# Patient Record
Sex: FEMALE | Race: BLACK OR AFRICAN AMERICAN | Employment: PART TIME | URBAN - METROPOLITAN AREA
[De-identification: names, ages, dates, MRNs, and addresses within clinical notes are randomized per-mention and may not be internally consistent; named-entity substitution may affect disease eponyms.]

---

## 2018-02-09 PROBLEM — M25.371 ANKLE INSTABILITY, RIGHT: Status: ACTIVE | Noted: 2018-02-09

## 2018-11-17 ENCOUNTER — HOSPITAL ENCOUNTER (OUTPATIENT)
Dept: SLEEP CENTER | Age: 43
Discharge: HOME | End: 2018-11-17
Payer: COMMERCIAL

## 2018-11-17 DIAGNOSIS — G47.33 OSA (OBSTRUCTIVE SLEEP APNEA): ICD-10-CM

## 2018-11-17 PROCEDURE — 95810 POLYSOM 6/> YRS 4/> PARAM: CPT

## 2019-02-08 ENCOUNTER — HOSPITAL ENCOUNTER (OUTPATIENT)
Dept: SLEEP CENTER | Age: 44
Discharge: HOME | End: 2019-02-08
Payer: COMMERCIAL

## 2019-02-08 DIAGNOSIS — G47.33 OSA (OBSTRUCTIVE SLEEP APNEA): ICD-10-CM

## 2019-02-08 PROCEDURE — 95811 POLYSOM 6/>YRS CPAP 4/> PARM: CPT

## 2020-03-09 ENCOUNTER — TELEPHONE (OUTPATIENT)
Dept: NEUROSURGERY | Age: 45
End: 2020-03-09

## 2021-02-08 VITALS — HEIGHT: 65 IN | BODY MASS INDEX: 35.82 KG/M2 | WEIGHT: 215 LBS

## 2021-02-08 RX ORDER — SODIUM CHLORIDE 0.9 % (FLUSH) 0.9 %
10 SYRINGE (ML) INJECTION EVERY 12 HOURS SCHEDULED
Status: CANCELLED | OUTPATIENT
Start: 2021-02-08

## 2021-02-08 RX ORDER — SODIUM CHLORIDE, SODIUM LACTATE, POTASSIUM CHLORIDE, CALCIUM CHLORIDE 600; 310; 30; 20 MG/100ML; MG/100ML; MG/100ML; MG/100ML
INJECTION, SOLUTION INTRAVENOUS CONTINUOUS
Status: CANCELLED | OUTPATIENT
Start: 2021-02-08

## 2021-02-08 RX ORDER — SODIUM CHLORIDE 0.9 % (FLUSH) 0.9 %
10 SYRINGE (ML) INJECTION PRN
Status: CANCELLED | OUTPATIENT
Start: 2021-02-08

## 2021-02-09 ENCOUNTER — HOSPITAL ENCOUNTER (OUTPATIENT)
Dept: PREADMISSION TESTING | Age: 46
Discharge: HOME | End: 2021-02-09
Payer: COMMERCIAL

## 2021-02-09 ENCOUNTER — HOSPITAL ENCOUNTER (OUTPATIENT)
Age: 46
Discharge: HOME | End: 2021-02-11
Payer: COMMERCIAL

## 2021-02-09 DIAGNOSIS — Z01.818 PRE-OP TESTING: ICD-10-CM

## 2021-02-09 DIAGNOSIS — Z01.818 PRE-OP TESTING: Primary | ICD-10-CM

## 2021-02-09 PROCEDURE — U0003 INFECTIOUS AGENT DETECTION BY NUCLEIC ACID (DNA OR RNA); SEVERE ACUTE RESPIRATORY SYNDROME CORONAVIRUS 2 (SARS-COV-2) (CORONAVIRUS DISEASE [COVID-19]), AMPLIFIED PROBE TECHNIQUE, MAKING USE OF HIGH THROUGHPUT TECHNOLOGIES AS DESCRIBED BY CMS-2020-01-R: HCPCS

## 2021-02-10 ENCOUNTER — HOSPITAL ENCOUNTER (OUTPATIENT)
Dept: PREADMISSION TESTING | Age: 46
Discharge: HOME | End: 2021-02-10
Payer: COMMERCIAL

## 2021-02-10 VITALS
WEIGHT: 210 LBS | BODY MASS INDEX: 33.75 KG/M2 | SYSTOLIC BLOOD PRESSURE: 144 MMHG | HEIGHT: 66 IN | DIASTOLIC BLOOD PRESSURE: 75 MMHG | RESPIRATION RATE: 18 BRPM | HEART RATE: 98 BPM | TEMPERATURE: 97.9 F | OXYGEN SATURATION: 99 %

## 2021-02-10 DIAGNOSIS — Z01.818 PRE-OP TESTING: ICD-10-CM

## 2021-02-10 LAB
ANION GAP SERPL CALCULATED.3IONS-SCNC: 10 MMOL/L (ref 7–16)
BUN BLDV-MCNC: 11 MG/DL (ref 6–20)
CALCIUM SERPL-MCNC: 8.8 MG/DL (ref 8.6–10.2)
CHLORIDE BLD-SCNC: 101 MMOL/L (ref 98–107)
CO2: 23 MMOL/L (ref 22–29)
CREAT SERPL-MCNC: 0.7 MG/DL (ref 0.5–1)
GFR AFRICAN AMERICAN: >60
GFR NON-AFRICAN AMERICAN: >60 ML/MIN/1.73
GLUCOSE BLD-MCNC: 93 MG/DL (ref 74–99)
HCT VFR BLD CALC: 37.3 % (ref 34–48)
HEMOGLOBIN: 12.6 G/DL (ref 11.5–15.5)
MCH RBC QN AUTO: 30.3 PG (ref 26–35)
MCHC RBC AUTO-ENTMCNC: 33.8 % (ref 32–34.5)
MCV RBC AUTO: 89.7 FL (ref 80–99.9)
PDW BLD-RTO: 13 FL (ref 11.5–15)
PLATELET # BLD: 294 E9/L (ref 130–450)
PMV BLD AUTO: 9.6 FL (ref 7–12)
POTASSIUM SERPL-SCNC: 4.2 MMOL/L (ref 3.5–5)
RBC # BLD: 4.16 E12/L (ref 3.5–5.5)
SODIUM BLD-SCNC: 134 MMOL/L (ref 132–146)
WBC # BLD: 6.9 E9/L (ref 4.5–11.5)

## 2021-02-10 PROCEDURE — 36415 COLL VENOUS BLD VENIPUNCTURE: CPT

## 2021-02-10 PROCEDURE — 80048 BASIC METABOLIC PNL TOTAL CA: CPT

## 2021-02-10 PROCEDURE — 93005 ELECTROCARDIOGRAM TRACING: CPT | Performed by: ANESTHESIOLOGY

## 2021-02-10 PROCEDURE — 85027 COMPLETE CBC AUTOMATED: CPT

## 2021-02-10 NOTE — PROGRESS NOTES
[address] [facility]                                                                                                                    PRE OP INSTRUCTIONS FOR  Shanelle Okeefe        Date: 2/10/2021    Date of surgery: 2/15/2021   Arrival Time: Hospital will call you between 5pm and 7pm with your final arrival time for surgery    1. Do not eat or drink anything after midnight prior to surgery. This includes no water, chewing gum, mints or ice chips. 2. Take the following medications with a small sip of water on the morning of Surgery: none. Bring albuterol and BP med        3. Aspirin, Ibuprofen, Advil, Naproxen, Vitamin E and other Anti-inflammatory products should be stopped  before surgery  as directed by your physician. Take Tylenol only unless instructed otherwise by your surgeon. 4. Don't use illicit drugs and do not drink any alcoholic beverages 24 hours prior to surgery. Stop smoking    5. You may brush your teeth the morning of surgery. DO NOT SWALLOW WATER    6. You MUST make arrangements for a responsible adult to take you home after your surgery. You will not be allowed to leave alone or drive yourself home. It is strongly suggested someone stay with you the first 24 hrs. Your surgery will be cancelled if you do not have a ride home. 7.     8. Please wear simple, loose fitting clothing to the hospital.  Arley Sayer not bring valuables (money, credit cards, checkbooks, etc.) Do not wear any makeup (including no eye makeup) or nail polish on your fingers or toes. 9. DO NOT wear any jewelry or piercings on day of surgery. All body piercing jewelry must be removed. 10.  Shower the night before surgery with _x__Antibacterial soap /  6.   15.     15.     15.     13. Notify your Surgeon if you develop any illness between now and surgery time, cough, cold, fever, sore throat, nausea, vomiting, etc.  Please notify your surgeon if you experience dizziness, shortness of breath or blurred vision between now & the time of your surgery. 16. If you have ___dentures, they will be removed before going to the OR; we will provide you a container. If you wear ___contact lenses or ___glasses, they will be removed; please bring a case for them. 17. To provide excellent care visitors will be limited to 1  in the room at any given time. 18.     19. Sleep apnea patients need to bring  SETTINGS to hospital on day of surgery. 20. During flu season no children under the age of 15 are permitted in the hospital for the safety of all patients. 21. Other come in main lobby and stop at                  Please call AMBULATORY CARE if you have any further questions.    [address] [facility]     [address]

## 2021-02-11 LAB
EKG ATRIAL RATE: 74 BPM
EKG P AXIS: 18 DEGREES
EKG P-R INTERVAL: 122 MS
EKG Q-T INTERVAL: 396 MS
EKG QRS DURATION: 80 MS
EKG QTC CALCULATION (BAZETT): 439 MS
EKG R AXIS: 21 DEGREES
EKG T AXIS: 23 DEGREES
EKG VENTRICULAR RATE: 74 BPM
SARS-COV-2: NOT DETECTED

## 2021-02-13 ENCOUNTER — ANESTHESIA EVENT (OUTPATIENT)
Dept: OPERATING ROOM | Age: 46
End: 2021-02-13
Payer: COMMERCIAL

## 2021-02-15 ENCOUNTER — ANESTHESIA (OUTPATIENT)
Dept: OPERATING ROOM | Age: 46
End: 2021-02-15
Payer: COMMERCIAL

## 2021-02-15 ENCOUNTER — HOSPITAL ENCOUNTER (OUTPATIENT)
Age: 46
Setting detail: OUTPATIENT SURGERY
Discharge: HOME | End: 2021-02-15
Attending: OBSTETRICS & GYNECOLOGY | Admitting: OBSTETRICS & GYNECOLOGY
Payer: COMMERCIAL

## 2021-02-15 VITALS
WEIGHT: 215 LBS | DIASTOLIC BLOOD PRESSURE: 82 MMHG | TEMPERATURE: 97 F | SYSTOLIC BLOOD PRESSURE: 168 MMHG | RESPIRATION RATE: 14 BRPM | OXYGEN SATURATION: 99 % | BODY MASS INDEX: 35.82 KG/M2 | HEART RATE: 76 BPM | HEIGHT: 65 IN

## 2021-02-15 VITALS
SYSTOLIC BLOOD PRESSURE: 118 MMHG | OXYGEN SATURATION: 100 % | DIASTOLIC BLOOD PRESSURE: 86 MMHG | RESPIRATION RATE: 20 BRPM

## 2021-02-15 DIAGNOSIS — Z01.818 PRE-OP TESTING: Primary | ICD-10-CM

## 2021-02-15 DIAGNOSIS — G89.18 POSTOPERATIVE PAIN: ICD-10-CM

## 2021-02-15 LAB — HCG(URINE) PREGNANCY TEST: NEGATIVE

## 2021-02-15 PROCEDURE — 7100000011 HC PHASE II RECOVERY - ADDTL 15 MIN: Performed by: OBSTETRICS & GYNECOLOGY

## 2021-02-15 PROCEDURE — 2580000003 HC RX 258: Performed by: ANESTHESIOLOGY

## 2021-02-15 PROCEDURE — 6370000000 HC RX 637 (ALT 250 FOR IP)

## 2021-02-15 PROCEDURE — 3700000000 HC ANESTHESIA ATTENDED CARE: Performed by: OBSTETRICS & GYNECOLOGY

## 2021-02-15 PROCEDURE — 7100000010 HC PHASE II RECOVERY - FIRST 15 MIN: Performed by: OBSTETRICS & GYNECOLOGY

## 2021-02-15 PROCEDURE — 7100000000 HC PACU RECOVERY - FIRST 15 MIN: Performed by: OBSTETRICS & GYNECOLOGY

## 2021-02-15 PROCEDURE — 81025 URINE PREGNANCY TEST: CPT

## 2021-02-15 PROCEDURE — 7100000001 HC PACU RECOVERY - ADDTL 15 MIN: Performed by: OBSTETRICS & GYNECOLOGY

## 2021-02-15 PROCEDURE — 3600000013 HC SURGERY LEVEL 3 ADDTL 15MIN: Performed by: OBSTETRICS & GYNECOLOGY

## 2021-02-15 PROCEDURE — 6360000002 HC RX W HCPCS: Performed by: OBSTETRICS & GYNECOLOGY

## 2021-02-15 PROCEDURE — 6360000002 HC RX W HCPCS

## 2021-02-15 PROCEDURE — C1886 CATHETER, ABLATION: HCPCS | Performed by: OBSTETRICS & GYNECOLOGY

## 2021-02-15 PROCEDURE — 6370000000 HC RX 637 (ALT 250 FOR IP): Performed by: ANESTHESIOLOGY

## 2021-02-15 PROCEDURE — 3700000001 HC ADD 15 MINUTES (ANESTHESIA): Performed by: OBSTETRICS & GYNECOLOGY

## 2021-02-15 PROCEDURE — 3600000003 HC SURGERY LEVEL 3 BASE: Performed by: OBSTETRICS & GYNECOLOGY

## 2021-02-15 PROCEDURE — 88305 TISSUE EXAM BY PATHOLOGIST: CPT

## 2021-02-15 PROCEDURE — 2709999900 HC NON-CHARGEABLE SUPPLY: Performed by: OBSTETRICS & GYNECOLOGY

## 2021-02-15 RX ORDER — PROPOFOL 10 MG/ML
INJECTION, EMULSION INTRAVENOUS PRN
Status: DISCONTINUED | OUTPATIENT
Start: 2021-02-15 | End: 2021-02-15 | Stop reason: SDUPTHER

## 2021-02-15 RX ORDER — DEXAMETHASONE SODIUM PHOSPHATE 10 MG/ML
INJECTION, SOLUTION INTRAMUSCULAR; INTRAVENOUS PRN
Status: DISCONTINUED | OUTPATIENT
Start: 2021-02-15 | End: 2021-02-15 | Stop reason: SDUPTHER

## 2021-02-15 RX ORDER — CEFAZOLIN SODIUM 2 G/50ML
2000 SOLUTION INTRAVENOUS EVERY 8 HOURS
Status: DISCONTINUED | OUTPATIENT
Start: 2021-02-15 | End: 2021-02-15 | Stop reason: HOSPADM

## 2021-02-15 RX ORDER — ALBUTEROL SULFATE 90 UG/1
AEROSOL, METERED RESPIRATORY (INHALATION) PRN
Status: DISCONTINUED | OUTPATIENT
Start: 2021-02-15 | End: 2021-02-15 | Stop reason: SDUPTHER

## 2021-02-15 RX ORDER — LIDOCAINE HYDROCHLORIDE 20 MG/ML
INJECTION, SOLUTION INTRAVENOUS PRN
Status: DISCONTINUED | OUTPATIENT
Start: 2021-02-15 | End: 2021-02-15 | Stop reason: SDUPTHER

## 2021-02-15 RX ORDER — MIDAZOLAM HYDROCHLORIDE 1 MG/ML
INJECTION INTRAMUSCULAR; INTRAVENOUS PRN
Status: DISCONTINUED | OUTPATIENT
Start: 2021-02-15 | End: 2021-02-15 | Stop reason: SDUPTHER

## 2021-02-15 RX ORDER — SODIUM CHLORIDE 0.9 % (FLUSH) 0.9 %
10 SYRINGE (ML) INJECTION PRN
Status: DISCONTINUED | OUTPATIENT
Start: 2021-02-15 | End: 2021-02-15 | Stop reason: HOSPADM

## 2021-02-15 RX ORDER — FENTANYL CITRATE 50 UG/ML
INJECTION, SOLUTION INTRAMUSCULAR; INTRAVENOUS PRN
Status: DISCONTINUED | OUTPATIENT
Start: 2021-02-15 | End: 2021-02-15 | Stop reason: SDUPTHER

## 2021-02-15 RX ORDER — FENTANYL CITRATE 50 UG/ML
50 INJECTION, SOLUTION INTRAMUSCULAR; INTRAVENOUS EVERY 5 MIN PRN
Status: DISCONTINUED | OUTPATIENT
Start: 2021-02-15 | End: 2021-02-15 | Stop reason: HOSPADM

## 2021-02-15 RX ORDER — SODIUM CHLORIDE, SODIUM LACTATE, POTASSIUM CHLORIDE, CALCIUM CHLORIDE 600; 310; 30; 20 MG/100ML; MG/100ML; MG/100ML; MG/100ML
INJECTION, SOLUTION INTRAVENOUS CONTINUOUS
Status: DISCONTINUED | OUTPATIENT
Start: 2021-02-15 | End: 2021-02-15 | Stop reason: HOSPADM

## 2021-02-15 RX ORDER — SODIUM CHLORIDE 0.9 % (FLUSH) 0.9 %
10 SYRINGE (ML) INJECTION EVERY 12 HOURS SCHEDULED
Status: DISCONTINUED | OUTPATIENT
Start: 2021-02-15 | End: 2021-02-15 | Stop reason: HOSPADM

## 2021-02-15 RX ORDER — HYDROCODONE BITARTRATE AND ACETAMINOPHEN 5; 325 MG/1; MG/1
1 TABLET ORAL
Status: COMPLETED | OUTPATIENT
Start: 2021-02-15 | End: 2021-02-15

## 2021-02-15 RX ORDER — FENTANYL CITRATE 50 UG/ML
INJECTION, SOLUTION INTRAMUSCULAR; INTRAVENOUS
Status: COMPLETED
Start: 2021-02-15 | End: 2021-02-15

## 2021-02-15 RX ORDER — HYDROCODONE BITARTRATE AND ACETAMINOPHEN 5; 325 MG/1; MG/1
1 TABLET ORAL EVERY 6 HOURS PRN
Qty: 10 TABLET | Refills: 0 | Status: SHIPPED | OUTPATIENT
Start: 2021-02-15 | End: 2021-02-22

## 2021-02-15 RX ADMIN — SODIUM CHLORIDE, POTASSIUM CHLORIDE, SODIUM LACTATE AND CALCIUM CHLORIDE: 600; 310; 30; 20 INJECTION, SOLUTION INTRAVENOUS at 08:29

## 2021-02-15 RX ADMIN — ALBUTEROL SULFATE 2 PUFF: 90 AEROSOL, METERED RESPIRATORY (INHALATION) at 10:00

## 2021-02-15 RX ADMIN — DEXAMETHASONE SODIUM PHOSPHATE 8 MG: 10 INJECTION, SOLUTION INTRAMUSCULAR; INTRAVENOUS at 10:21

## 2021-02-15 RX ADMIN — FENTANYL CITRATE 50 MCG: 50 INJECTION, SOLUTION INTRAMUSCULAR; INTRAVENOUS at 10:09

## 2021-02-15 RX ADMIN — FENTANYL CITRATE 50 MCG: 50 INJECTION, SOLUTION INTRAMUSCULAR; INTRAVENOUS at 10:54

## 2021-02-15 RX ADMIN — MIDAZOLAM 2 MG: 1 INJECTION INTRAMUSCULAR; INTRAVENOUS at 09:58

## 2021-02-15 RX ADMIN — HYDROCODONE BITARTRATE AND ACETAMINOPHEN 1 TABLET: 5; 325 TABLET ORAL at 11:47

## 2021-02-15 RX ADMIN — FENTANYL CITRATE 50 MCG: 50 INJECTION INTRAMUSCULAR; INTRAVENOUS at 10:54

## 2021-02-15 RX ADMIN — FENTANYL CITRATE 50 MCG: 50 INJECTION, SOLUTION INTRAMUSCULAR; INTRAVENOUS at 10:14

## 2021-02-15 RX ADMIN — LIDOCAINE HYDROCHLORIDE 100 MG: 20 INJECTION, SOLUTION INTRAVENOUS at 10:02

## 2021-02-15 RX ADMIN — FENTANYL CITRATE 50 MCG: 50 INJECTION, SOLUTION INTRAMUSCULAR; INTRAVENOUS at 10:02

## 2021-02-15 RX ADMIN — PROPOFOL 200 MG: 10 INJECTION, EMULSION INTRAVENOUS at 10:02

## 2021-02-15 RX ADMIN — CEFAZOLIN SODIUM 2 G: 2 SOLUTION INTRAVENOUS at 10:00

## 2021-02-15 RX ADMIN — FENTANYL CITRATE 50 MCG: 50 INJECTION, SOLUTION INTRAMUSCULAR; INTRAVENOUS at 10:21

## 2021-02-15 ASSESSMENT — PULMONARY FUNCTION TESTS
PIF_VALUE: 11
PIF_VALUE: 12
PIF_VALUE: 0
PIF_VALUE: 0
PIF_VALUE: 2
PIF_VALUE: 0
PIF_VALUE: 0
PIF_VALUE: 2
PIF_VALUE: 10
PIF_VALUE: 2
PIF_VALUE: 11
PIF_VALUE: 0
PIF_VALUE: 21
PIF_VALUE: 11
PIF_VALUE: 4
PIF_VALUE: 0

## 2021-02-15 ASSESSMENT — PAIN DESCRIPTION - ORIENTATION: ORIENTATION: LOWER

## 2021-02-15 ASSESSMENT — PAIN DESCRIPTION - DESCRIPTORS: DESCRIPTORS: CRAMPING

## 2021-02-15 ASSESSMENT — PAIN SCALES - GENERAL
PAINLEVEL_OUTOF10: 4
PAINLEVEL_OUTOF10: 8
PAINLEVEL_OUTOF10: 0
PAINLEVEL_OUTOF10: 6

## 2021-02-15 ASSESSMENT — LIFESTYLE VARIABLES: SMOKING_STATUS: 1

## 2021-02-15 ASSESSMENT — PAIN DESCRIPTION - FREQUENCY: FREQUENCY: INTERMITTENT

## 2021-02-15 ASSESSMENT — PAIN DESCRIPTION - PAIN TYPE
TYPE: SURGICAL PAIN
TYPE: SURGICAL PAIN

## 2021-02-15 NOTE — H&P
Subjective:      Patient is a 54 y.o. female scheduled for hysteroscopy D&C and endometrial ablation. Patient has significant menorrhagia. Patient had outpatient endometrial biopsy and ultrasound which were both normal.  Patient reports she bleeds about 2 to 3 weeks/month. This was discussed with her. Patient desires surgical treatment. Discussed Blood/Blood Products: yes    Patient Active Problem List    Diagnosis Date Noted    Ankle instability, right 02/09/2018     Priority: Medium     Class: Present on Admission    Hallux valgus, acquired 01/31/2014     Priority: Low     Class: Present on Admission     Past Medical History:   Diagnosis Date    Arthritis     back    Asthma     Hypertension     ran out of meds    SAUL (obstructive sleep apnea)     uses cpap      Past Surgical History:   Procedure Laterality Date    BUNIONECTOMY Left 8/9/13    jovan    FOOT SURGERY Right 1/30/2014    jovan bunionectomy right foot    OTHER SURGICAL HISTORY Right 02/09/2018    internal ank.e stabilization    TUBAL LIGATION        Medications Prior to Admission: ALBUTEROL IN, Inhale 2 Inhalers into the lungs every 4 hours as needed   Allergies   Allergen Reactions    Codeine Itching and Rash      Social History     Tobacco Use    Smoking status: Current Every Day Smoker     Packs/day: 0.25     Years: 15.00     Pack years: 3.75     Types: Cigarettes    Smokeless tobacco: Never Used   Substance Use Topics    Alcohol use: Yes     Alcohol/week: 4.0 standard drinks     Types: 4 Cans of beer per week      Family History   Problem Relation Age of Onset    High Blood Pressure Mother     Breast Cancer Mother     Cancer Mother         uterine    Heart Attack Mother     Diabetes Father         Review of Systems  Pertinent items are noted in HPI.       Objective:      LMP 01/25/2021 (Approximate)     General:   alert, appears stated age and cooperative   Skin:   normal   HEENT:  PERRLA   Lungs:   clear to auscultation bilaterally   Heart:   regular rate and rhythm, S1, S2 normal, no murmur, click, rub or gallop   Breasts:      Abdomen:  soft, non-tender; bowel sounds normal; no masses,  no organomegaly   Pelvis:  Exam deferred. Assessment:       Menorrhagia        Plan:      Counseling: Procedure, risks, reasons, benefits and complications reviewed in detail. Consent signed. Preop testing ordered. Instructions reviewed, including NPO after midnight.     Hysteroscopy D&C and endometrial ablation       Fernanda Lange [facility]  2/15/2021

## 2021-02-15 NOTE — PROGRESS NOTES
Patient here for surgery, no surgical orders are in computer so unable to sign surgical consent, large note placed on chart/ need orders/consent,   kandi, surgery charge nurse notified need orders.

## 2021-02-15 NOTE — PROGRESS NOTES
Pt c/o 6/10 pain, given gingerale and crackers at this time. Would like pain medicine prior to discharge.

## 2021-02-15 NOTE — OP NOTE
Operative Note      Patient: Ford Pulliam  YOB: 1975  MRN: [id number]    Date of Procedure: 2/15/2021    Pre-Op Diagnosis: MENORRHAGIA    Post-Op Diagnosis: Same       Procedure(s): HYSTEROSCOPY DILATATION AND CURETTAGE/ENDOMETRIAL ABLATION    Surgeon(s):  Flavia Bond MD    Assistant:   * No surgical staff found *    Anesthesia: General    Estimated Blood Loss (mL): Minimal    Complications: None    Specimens:   ID Type Source Tests Collected by Time Destination   A : ENDOMETRIAL CURETTINGS Tissue Tissue SURGICAL PATHOLOGY Flavia Bond MD 2/15/2021 1023        Implants:  * No implants in log *      Drains: * No LDAs found *    Findings: Normal uterine cavity    Detailed Description of Procedure:   Patient was brought into the operating room and placed in the dorsal supine position. General anesthesia with laryngeal mask airway was secured. Patient was then placed in the dorsolithotomy position and prepped and draped in the usual sterile fashion. Vaginal retractors were introduced. Cervix was held with a single-tooth tenaculum and dilated easily to admit the hysteroscope. Hysteroscopy was performed and the uterine cavity was normal.  Sharp curettage was then performed to the uterus was deemed empty the uterine length was measured at 5 cm. The NovaSure device was then prepared and introduced into the uterine cavity and deployed with a width of 4 cm. Using a standard cycle NovaSure endometrial ablation was then performed. Upon completion hysteroscopy was performed with complete coverage of the uterus.   The tenaculum was then removed patient was woken from general anesthesia and transferred to recovery room stable    Electronically signed by Flavia Bond MD on 2/15/2021 at 10:27 AM

## 2021-02-15 NOTE — ANESTHESIA PRE PROCEDURE
Department of Anesthesiology  Preprocedure Note       Name:  Claudetta Lee   Age:  54 y.o.  :  1975                                          MRN:  [id number]         Date:  2/15/2021      Surgeon: Ji Quintana):  Raza Sheikh MD    Procedure: Procedure(s): HYSTEROSCOPY DILATATION AND CURETTAGE/ENDOMETRIAL ABLATION    Medications prior to admission:   Prior to Admission medications    Medication Sig Start Date End Date Taking? Authorizing Provider   ALBUTEROL IN Inhale 2 Inhalers into the lungs every 4 hours as needed     Historical Provider, MD       Current medications:    Current Facility-Administered Medications   Medication Dose Route Frequency Provider Last Rate Last Admin    lactated ringers infusion   Intravenous Continuous Kathy Torres MD 50 mL/hr at 02/15/21 0829 New Bag at 02/15/21 0829    sodium chloride flush 0.9 % injection 10 mL  10 mL Intravenous 2 times per day Kathy Torres MD        sodium chloride flush 0.9 % injection 10 mL  10 mL Intravenous PRN Kathy Torres MD        ceFAZolin (ANCEF) 2000 mg in dextrose 3 % 50 mL IVPB (duplex)  2,000 mg Intravenous Nadeen Modi MD           Allergies:     Allergies   Allergen Reactions    Codeine Itching and Rash       Problem List:    Patient Active Problem List   Diagnosis Code    Hallux valgus, acquired M20.10    Ankle instability, right M25.371       Past Medical History:        Diagnosis Date    Arthritis     back    Asthma     Hypertension     ran out of meds    SAUL (obstructive sleep apnea)     uses cpap       Past Surgical History:        Procedure Laterality Date    BUNIONECTOMY Left 13    jovan    FOOT SURGERY Right 2014    jovan bunionectomy right foot    OTHER SURGICAL HISTORY Right 2018    internal ank.e stabilization    TUBAL LIGATION         Social History:    Social History     Tobacco Use    Smoking status: Current Every Day Smoker     Packs/day: 0.25     Years: 15.00     Pack years: 3.75 Types: Cigarettes    Smokeless tobacco: Never Used   Substance Use Topics    Alcohol use: Yes     Alcohol/week: 4.0 standard drinks     Types: 4 Cans of beer per week                                Ready to quit: Not Answered  Counseling given: Not Answered      Vital Signs (Current):   Vitals:    02/15/21 0808   BP: (!) 167/85   Pulse: 95   Resp: 16   Temp: 97.1 °F (36.2 °C)   TempSrc: Infrared   SpO2: 99%   Weight: 215 lb (97.5 kg)   Height: 5' 5\" (1.651 m)                                              BP Readings from Last 3 Encounters:   02/15/21 (!) 167/85   02/10/21 (!) 144/75   02/09/18 (!) 155/104       NPO Status: Time of last liquid consumption: 2300                        Time of last solid consumption: 2300                        Date of last liquid consumption: 02/14/21                        Date of last solid food consumption: 02/14/21    BMI:   Wt Readings from Last 3 Encounters:   02/15/21 215 lb (97.5 kg)   02/10/21 210 lb (95.3 kg)   02/08/21 215 lb (97.5 kg)     Body mass index is 35.78 kg/m². CBC:   Lab Results   Component Value Date    WBC 6.9 02/10/2021    RBC 4.16 02/10/2021    HGB 12.6 02/10/2021    HCT 37.3 02/10/2021    MCV 89.7 02/10/2021    RDW 13.0 02/10/2021     02/10/2021       CMP:   Lab Results   Component Value Date     02/10/2021    K 4.2 02/10/2021     02/10/2021    CO2 23 02/10/2021    BUN 11 02/10/2021    CREATININE 0.7 02/10/2021    GFRAA >60 02/10/2021    LABGLOM >60 02/10/2021    GLUCOSE 93 02/10/2021    CALCIUM 8.8 02/10/2021       POC Tests: No results for input(s): POCGLU, POCNA, POCK, POCCL, POCBUN, POCHEMO, POCHCT in the last 72 hours.     Coags: No results found for: PROTIME, INR, APTT    HCG (If Applicable):   Lab Results   Component Value Date    PREGTESTUR NEGATIVE 02/15/2021        ABGs: No results found for: PHART, PO2ART, CXI4AJP, IHG0QWB, BEART, L7XBVGAO     Type & Screen (If Applicable):  No results found for: Silviano Fierro Drug/Infectious Status (If Applicable):  No results found for: HIV, HEPCAB    COVID-19 Screening (If Applicable): No results found for: COVID19      Anesthesia Evaluation  Patient summary reviewed no history of anesthetic complications:   Airway: Mallampati: II  TM distance: >3 FB   Neck ROM: full  Mouth opening: > = 3 FB Dental:      Comment: Denies loose teeth    Pulmonary: breath sounds clear to auscultation  (+) sleep apnea:  asthma: current smoker                          ROS comment: Marijuana use   Cardiovascular:    (+) hypertension:,         Rhythm: regular             Beta Blocker:  Not on Beta Blocker         Neuro/Psych:   Negative Neuro/Psych ROS              GI/Hepatic/Renal: Neg GI/Hepatic/Renal ROS            Endo/Other:                      ROS comment: Menorrhagia  Abdominal:           Vascular: negative vascular ROS. Anesthesia Plan      general     ASA 3       Induction: intravenous. MIPS: Postoperative opioids intended and Prophylactic antiemetics administered. Anesthetic plan and risks discussed with patient. Plan discussed with MJ.             Yuridia Ervin DO   2/15/2021

## 2021-02-15 NOTE — PROGRESS NOTES
CLINICAL PHARMACY NOTE: MEDS TO [address] [facility] Patient?: No  Total # of Prescriptions Filled: 1   The following medications were delivered to the patient:  [address] LALA   Total # of Interventions Completed: 3  Time Spent (min): 15    Additional Documentation:

## 2023-07-10 ENCOUNTER — APPOINTMENT (OUTPATIENT)
Dept: CT IMAGING | Age: 48
DRG: 044 | End: 2023-07-10
Payer: COMMERCIAL

## 2023-07-10 ENCOUNTER — HOSPITAL ENCOUNTER (INPATIENT)
Age: 48
LOS: 3 days | Discharge: HOME | DRG: 044 | End: 2023-07-13
Attending: EMERGENCY MEDICINE | Admitting: INTERNAL MEDICINE
Payer: COMMERCIAL

## 2023-07-10 ENCOUNTER — APPOINTMENT (OUTPATIENT)
Dept: INTERVENTIONAL RADIOLOGY/VASCULAR | Age: 48
DRG: 044 | End: 2023-07-10
Payer: COMMERCIAL

## 2023-07-10 ENCOUNTER — APPOINTMENT (OUTPATIENT)
Dept: MRI IMAGING | Age: 48
DRG: 044 | End: 2023-07-10
Payer: COMMERCIAL

## 2023-07-10 DIAGNOSIS — R51.9 NONINTRACTABLE HEADACHE, UNSPECIFIED CHRONICITY PATTERN, UNSPECIFIED HEADACHE TYPE: ICD-10-CM

## 2023-07-10 DIAGNOSIS — I61.5 INTRAVENTRICULAR HEMORRHAGE (HCC): Primary | ICD-10-CM

## 2023-07-10 PROBLEM — I62.9 INTRACRANIAL HEMORRHAGE (HCC): Status: ACTIVE | Noted: 2023-07-10

## 2023-07-10 LAB
ALBUMIN SERPL-MCNC: 3.8 G/DL (ref 3.5–5.2)
ALP SERPL-CCNC: 108 U/L (ref 35–104)
ALT SERPL-CCNC: 13 U/L (ref 0–32)
ANION GAP SERPL CALCULATED.3IONS-SCNC: 10 MMOL/L (ref 7–16)
AST SERPL-CCNC: 15 U/L (ref 0–31)
BASOPHILS # BLD: 0.02 E9/L (ref 0–0.2)
BASOPHILS NFR BLD: 0.3 % (ref 0–2)
BILIRUB SERPL-MCNC: 0.4 MG/DL (ref 0–1.2)
BUN SERPL-MCNC: 10 MG/DL (ref 6–20)
CALCIUM SERPL-MCNC: 8.4 MG/DL (ref 8.6–10.2)
CHLORIDE SERPL-SCNC: 104 MMOL/L (ref 98–107)
CO2 SERPL-SCNC: 19 MMOL/L (ref 22–29)
CREAT SERPL-MCNC: 0.7 MG/DL (ref 0.5–1)
EKG ATRIAL RATE: 76 BPM
EKG P AXIS: 72 DEGREES
EKG P-R INTERVAL: 126 MS
EKG Q-T INTERVAL: 446 MS
EKG QRS DURATION: 84 MS
EKG QTC CALCULATION (BAZETT): 501 MS
EKG R AXIS: 79 DEGREES
EKG T AXIS: 62 DEGREES
EKG VENTRICULAR RATE: 76 BPM
EOSINOPHIL # BLD: 0.01 E9/L (ref 0.05–0.5)
EOSINOPHIL NFR BLD: 0.1 % (ref 0–6)
ERYTHROCYTE [DISTWIDTH] IN BLOOD BY AUTOMATED COUNT: 13.2 FL (ref 11.5–15)
GLUCOSE SERPL-MCNC: 135 MG/DL (ref 74–99)
HCT VFR BLD AUTO: 40.5 % (ref 34–48)
HGB BLD-MCNC: 12.9 G/DL (ref 11.5–15.5)
IMM GRANULOCYTES # BLD: 0.05 E9/L
IMM GRANULOCYTES NFR BLD: 0.7 % (ref 0–5)
INR BLD: 1.1
LYMPHOCYTES # BLD: 1.07 E9/L (ref 1.5–4)
LYMPHOCYTES NFR BLD: 15.4 % (ref 20–42)
MCH RBC QN AUTO: 30 PG (ref 26–35)
MCHC RBC AUTO-ENTMCNC: 31.9 % (ref 32–34.5)
MCV RBC AUTO: 94.2 FL (ref 80–99.9)
MONOCYTES # BLD: 0.21 E9/L (ref 0.1–0.95)
MONOCYTES NFR BLD: 3 % (ref 2–12)
NEUTROPHILS # BLD: 5.61 E9/L (ref 1.8–7.3)
NEUTS SEG NFR BLD: 80.5 % (ref 43–80)
PLATELET # BLD AUTO: 314 E9/L (ref 130–450)
PMV BLD AUTO: 9.2 FL (ref 7–12)
POTASSIUM SERPL-SCNC: 3.9 MMOL/L (ref 3.5–5)
PROT SERPL-MCNC: 6.9 G/DL (ref 6.4–8.3)
PROTHROMBIN TIME: 11.9 SEC (ref 9.3–12.4)
RBC # BLD AUTO: 4.3 E12/L (ref 3.5–5.5)
SODIUM SERPL-SCNC: 133 MMOL/L (ref 132–146)
TROPONIN, HIGH SENSITIVITY: <6 NG/L (ref 0–9)
WBC # BLD: 7 E9/L (ref 4.5–11.5)

## 2023-07-10 PROCEDURE — 6360000002 HC RX W HCPCS: Performed by: NEUROLOGICAL SURGERY

## 2023-07-10 PROCEDURE — 6360000002 HC RX W HCPCS

## 2023-07-10 PROCEDURE — 6360000002 HC RX W HCPCS: Performed by: INTERNAL MEDICINE

## 2023-07-10 PROCEDURE — 2500000003 HC RX 250 WO HCPCS: Performed by: PSYCHIATRY & NEUROLOGY

## 2023-07-10 PROCEDURE — 85025 COMPLETE CBC W/AUTO DIFF WBC: CPT

## 2023-07-10 PROCEDURE — 2580000003 HC RX 258: Performed by: NEUROLOGICAL SURGERY

## 2023-07-10 PROCEDURE — 99291 CRITICAL CARE FIRST HOUR: CPT | Performed by: SURGERY

## 2023-07-10 PROCEDURE — 70498 CT ANGIOGRAPHY NECK: CPT

## 2023-07-10 PROCEDURE — 70450 CT HEAD/BRAIN W/O DYE: CPT

## 2023-07-10 PROCEDURE — 2000000000 HC ICU R&B

## 2023-07-10 PROCEDURE — 6360000004 HC RX CONTRAST MEDICATION: Performed by: RADIOLOGY

## 2023-07-10 PROCEDURE — 93010 ELECTROCARDIOGRAM REPORT: CPT | Performed by: INTERNAL MEDICINE

## 2023-07-10 PROCEDURE — 7100000001 HC PACU RECOVERY - ADDTL 15 MIN

## 2023-07-10 PROCEDURE — 99285 EMERGENCY DEPT VISIT HI MDM: CPT

## 2023-07-10 PROCEDURE — 96374 THER/PROPH/DIAG INJ IV PUSH: CPT

## 2023-07-10 PROCEDURE — 84295 ASSAY OF SERUM SODIUM: CPT

## 2023-07-10 PROCEDURE — A4216 STERILE WATER/SALINE, 10 ML: HCPCS | Performed by: NEUROLOGICAL SURGERY

## 2023-07-10 PROCEDURE — 80053 COMPREHEN METABOLIC PANEL: CPT

## 2023-07-10 PROCEDURE — 2580000003 HC RX 258: Performed by: INTERNAL MEDICINE

## 2023-07-10 PROCEDURE — 84484 ASSAY OF TROPONIN QUANT: CPT

## 2023-07-10 PROCEDURE — 6370000000 HC RX 637 (ALT 250 FOR IP): Performed by: PSYCHIATRY & NEUROLOGY

## 2023-07-10 PROCEDURE — 7100000000 HC PACU RECOVERY - FIRST 15 MIN

## 2023-07-10 PROCEDURE — 6360000004 HC RX CONTRAST MEDICATION: Performed by: PSYCHIATRY & NEUROLOGY

## 2023-07-10 PROCEDURE — 36592 COLLECT BLOOD FROM PICC: CPT

## 2023-07-10 PROCEDURE — 02HV33Z INSERTION OF INFUSION DEVICE INTO SUPERIOR VENA CAVA, PERCUTANEOUS APPROACH: ICD-10-PCS | Performed by: INTERNAL MEDICINE

## 2023-07-10 PROCEDURE — 85610 PROTHROMBIN TIME: CPT

## 2023-07-10 PROCEDURE — C9113 INJ PANTOPRAZOLE SODIUM, VIA: HCPCS | Performed by: NEUROLOGICAL SURGERY

## 2023-07-10 PROCEDURE — 80307 DRUG TEST PRSMV CHEM ANLYZR: CPT

## 2023-07-10 PROCEDURE — 36224 PLACE CATH CAROTD ART: CPT

## 2023-07-10 PROCEDURE — 2500000003 HC RX 250 WO HCPCS: Performed by: NEUROLOGICAL SURGERY

## 2023-07-10 PROCEDURE — 93005 ELECTROCARDIOGRAM TRACING: CPT | Performed by: EMERGENCY MEDICINE

## 2023-07-10 PROCEDURE — 2580000003 HC RX 258: Performed by: PSYCHIATRY & NEUROLOGY

## 2023-07-10 PROCEDURE — 6360000002 HC RX W HCPCS: Performed by: PSYCHIATRY & NEUROLOGY

## 2023-07-10 PROCEDURE — 70553 MRI BRAIN STEM W/O & W/DYE: CPT

## 2023-07-10 PROCEDURE — 76377 3D RENDER W/INTRP POSTPROCES: CPT

## 2023-07-10 PROCEDURE — 6360000002 HC RX W HCPCS: Performed by: EMERGENCY MEDICINE

## 2023-07-10 PROCEDURE — C1751 CATH, INF, PER/CENT/MIDLINE: HCPCS

## 2023-07-10 PROCEDURE — 36226 PLACE CATH VERTEBRAL ART: CPT

## 2023-07-10 PROCEDURE — 36556 INSERT NON-TUNNEL CV CATH: CPT

## 2023-07-10 PROCEDURE — 70496 CT ANGIOGRAPHY HEAD: CPT

## 2023-07-10 PROCEDURE — A9579 GAD-BASE MR CONTRAST NOS,1ML: HCPCS | Performed by: RADIOLOGY

## 2023-07-10 RX ORDER — ONDANSETRON 2 MG/ML
4 INJECTION INTRAMUSCULAR; INTRAVENOUS EVERY 6 HOURS PRN
Status: DISCONTINUED | OUTPATIENT
Start: 2023-07-10 | End: 2023-07-10 | Stop reason: SDUPTHER

## 2023-07-10 RX ORDER — LEVETIRACETAM 500 MG/5ML
1000 INJECTION, SOLUTION, CONCENTRATE INTRAVENOUS ONCE
Status: DISCONTINUED | OUTPATIENT
Start: 2023-07-10 | End: 2023-07-10

## 2023-07-10 RX ORDER — HYDRALAZINE HYDROCHLORIDE 20 MG/ML
5 INJECTION INTRAMUSCULAR; INTRAVENOUS EVERY 10 MIN PRN
Status: DISCONTINUED | OUTPATIENT
Start: 2023-07-10 | End: 2023-07-12

## 2023-07-10 RX ORDER — ACETAMINOPHEN 325 MG/1
650 TABLET ORAL EVERY 4 HOURS PRN
Status: DISCONTINUED | OUTPATIENT
Start: 2023-07-10 | End: 2023-07-13 | Stop reason: HOSPADM

## 2023-07-10 RX ORDER — DEXAMETHASONE SODIUM PHOSPHATE 4 MG/ML
6 INJECTION, SOLUTION INTRA-ARTICULAR; INTRALESIONAL; INTRAMUSCULAR; INTRAVENOUS; SOFT TISSUE EVERY 6 HOURS
Status: DISCONTINUED | OUTPATIENT
Start: 2023-07-10 | End: 2023-07-10

## 2023-07-10 RX ORDER — FENTANYL CITRATE 50 UG/ML
50 INJECTION, SOLUTION INTRAMUSCULAR; INTRAVENOUS ONCE
Status: COMPLETED | OUTPATIENT
Start: 2023-07-10 | End: 2023-07-10

## 2023-07-10 RX ORDER — ONDANSETRON 2 MG/ML
INJECTION INTRAMUSCULAR; INTRAVENOUS PRN
Status: COMPLETED | OUTPATIENT
Start: 2023-07-10 | End: 2023-07-10

## 2023-07-10 RX ORDER — POLYETHYLENE GLYCOL 3350 17 G/17G
17 POWDER, FOR SOLUTION ORAL DAILY PRN
Status: DISCONTINUED | OUTPATIENT
Start: 2023-07-10 | End: 2023-07-13 | Stop reason: HOSPADM

## 2023-07-10 RX ORDER — ONDANSETRON 4 MG/1
4 TABLET, ORALLY DISINTEGRATING ORAL EVERY 8 HOURS PRN
Status: DISCONTINUED | OUTPATIENT
Start: 2023-07-10 | End: 2023-07-13 | Stop reason: HOSPADM

## 2023-07-10 RX ORDER — FENTANYL CITRATE 50 UG/ML
INJECTION, SOLUTION INTRAMUSCULAR; INTRAVENOUS
Status: COMPLETED
Start: 2023-07-10 | End: 2023-07-10

## 2023-07-10 RX ORDER — CALCIUM CARBONATE 500 MG/1
500 TABLET, CHEWABLE ORAL 3 TIMES DAILY PRN
Status: DISCONTINUED | OUTPATIENT
Start: 2023-07-10 | End: 2023-07-13 | Stop reason: HOSPADM

## 2023-07-10 RX ORDER — DEXAMETHASONE SODIUM PHOSPHATE 4 MG/ML
2 INJECTION, SOLUTION INTRA-ARTICULAR; INTRALESIONAL; INTRAMUSCULAR; INTRAVENOUS; SOFT TISSUE EVERY 6 HOURS
Status: DISCONTINUED | OUTPATIENT
Start: 2023-07-11 | End: 2023-07-12

## 2023-07-10 RX ORDER — SODIUM CHLORIDE 0.9 % (FLUSH) 0.9 %
5-40 SYRINGE (ML) INJECTION 2 TIMES DAILY
Status: DISCONTINUED | OUTPATIENT
Start: 2023-07-10 | End: 2023-07-13 | Stop reason: HOSPADM

## 2023-07-10 RX ORDER — SODIUM CHLORIDE 0.9 % (FLUSH) 0.9 %
5-40 SYRINGE (ML) INJECTION EVERY 12 HOURS SCHEDULED
Status: DISCONTINUED | OUTPATIENT
Start: 2023-07-10 | End: 2023-07-13 | Stop reason: HOSPADM

## 2023-07-10 RX ORDER — LEVETIRACETAM 500 MG/5ML
500 INJECTION, SOLUTION, CONCENTRATE INTRAVENOUS EVERY 12 HOURS
Status: DISCONTINUED | OUTPATIENT
Start: 2023-07-10 | End: 2023-07-12

## 2023-07-10 RX ORDER — ACETAMINOPHEN 325 MG/1
650 TABLET ORAL EVERY 4 HOURS PRN
Status: DISCONTINUED | OUTPATIENT
Start: 2023-07-10 | End: 2023-07-10 | Stop reason: SDUPTHER

## 2023-07-10 RX ORDER — LORAZEPAM 2 MG/ML
0.5 INJECTION INTRAMUSCULAR ONCE
Status: COMPLETED | OUTPATIENT
Start: 2023-07-10 | End: 2023-07-10

## 2023-07-10 RX ORDER — ASPIRIN 81 MG/1
81 TABLET ORAL DAILY
COMMUNITY

## 2023-07-10 RX ORDER — LIDOCAINE HYDROCHLORIDE 20 MG/ML
INJECTION, SOLUTION INFILTRATION; PERINEURAL PRN
Status: COMPLETED | OUTPATIENT
Start: 2023-07-10 | End: 2023-07-10

## 2023-07-10 RX ORDER — HEPARIN SODIUM 10000 [USP'U]/ML
INJECTION, SOLUTION INTRAVENOUS; SUBCUTANEOUS PRN
Status: COMPLETED | OUTPATIENT
Start: 2023-07-10 | End: 2023-07-10

## 2023-07-10 RX ORDER — THIAMINE HYDROCHLORIDE 100 MG/ML
100 INJECTION, SOLUTION INTRAMUSCULAR; INTRAVENOUS DAILY
Status: DISCONTINUED | OUTPATIENT
Start: 2023-07-10 | End: 2023-07-13 | Stop reason: HOSPADM

## 2023-07-10 RX ORDER — MIDAZOLAM HYDROCHLORIDE 2 MG/2ML
INJECTION, SOLUTION INTRAMUSCULAR; INTRAVENOUS PRN
Status: COMPLETED | OUTPATIENT
Start: 2023-07-10 | End: 2023-07-10

## 2023-07-10 RX ORDER — DEXAMETHASONE SODIUM PHOSPHATE 10 MG/ML
10 INJECTION INTRAMUSCULAR; INTRAVENOUS ONCE
Status: COMPLETED | OUTPATIENT
Start: 2023-07-10 | End: 2023-07-10

## 2023-07-10 RX ORDER — FENTANYL CITRATE 50 UG/ML
INJECTION, SOLUTION INTRAMUSCULAR; INTRAVENOUS PRN
Status: COMPLETED | OUTPATIENT
Start: 2023-07-10 | End: 2023-07-10

## 2023-07-10 RX ORDER — SODIUM CHLORIDE 0.9 % (FLUSH) 0.9 %
5-40 SYRINGE (ML) INJECTION PRN
Status: DISCONTINUED | OUTPATIENT
Start: 2023-07-10 | End: 2023-07-13 | Stop reason: HOSPADM

## 2023-07-10 RX ORDER — LABETALOL HYDROCHLORIDE 5 MG/ML
10 INJECTION, SOLUTION INTRAVENOUS EVERY 10 MIN PRN
Status: DISCONTINUED | OUTPATIENT
Start: 2023-07-10 | End: 2023-07-10 | Stop reason: SDUPTHER

## 2023-07-10 RX ORDER — FENTANYL CITRATE 50 UG/ML
50 INJECTION, SOLUTION INTRAMUSCULAR; INTRAVENOUS
Status: DISCONTINUED | OUTPATIENT
Start: 2023-07-10 | End: 2023-07-12

## 2023-07-10 RX ORDER — ONDANSETRON 4 MG/1
4 TABLET, ORALLY DISINTEGRATING ORAL EVERY 8 HOURS PRN
Status: DISCONTINUED | OUTPATIENT
Start: 2023-07-10 | End: 2023-07-10 | Stop reason: SDUPTHER

## 2023-07-10 RX ORDER — 3% SODIUM CHLORIDE 3 G/100ML
25 INJECTION, SOLUTION INTRAVENOUS CONTINUOUS
Status: DISCONTINUED | OUTPATIENT
Start: 2023-07-10 | End: 2023-07-12

## 2023-07-10 RX ORDER — SODIUM CHLORIDE 9 MG/ML
INJECTION, SOLUTION INTRAVENOUS PRN
Status: DISCONTINUED | OUTPATIENT
Start: 2023-07-10 | End: 2023-07-13 | Stop reason: HOSPADM

## 2023-07-10 RX ORDER — LABETALOL HYDROCHLORIDE 5 MG/ML
5 INJECTION, SOLUTION INTRAVENOUS EVERY 10 MIN PRN
Status: DISCONTINUED | OUTPATIENT
Start: 2023-07-10 | End: 2023-07-12

## 2023-07-10 RX ORDER — LANOLIN ALCOHOL/MO/W.PET/CERES
3 CREAM (GRAM) TOPICAL NIGHTLY PRN
Status: DISCONTINUED | OUTPATIENT
Start: 2023-07-10 | End: 2023-07-13 | Stop reason: HOSPADM

## 2023-07-10 RX ORDER — ONDANSETRON 2 MG/ML
4 INJECTION INTRAMUSCULAR; INTRAVENOUS EVERY 6 HOURS PRN
Status: DISCONTINUED | OUTPATIENT
Start: 2023-07-10 | End: 2023-07-13 | Stop reason: HOSPADM

## 2023-07-10 RX ORDER — FOLIC ACID 5 MG/ML
1 INJECTION, SOLUTION INTRAMUSCULAR; INTRAVENOUS; SUBCUTANEOUS DAILY
Status: DISCONTINUED | OUTPATIENT
Start: 2023-07-11 | End: 2023-07-13 | Stop reason: HOSPADM

## 2023-07-10 RX ADMIN — LABETALOL HYDROCHLORIDE 5 MG: 5 INJECTION INTRAVENOUS at 23:08

## 2023-07-10 RX ADMIN — SODIUM CHLORIDE, PRESERVATIVE FREE 10 ML: 5 INJECTION INTRAVENOUS at 20:17

## 2023-07-10 RX ADMIN — Medication 1000 ML: at 18:55

## 2023-07-10 RX ADMIN — LABETALOL HYDROCHLORIDE 10 MG: 5 INJECTION INTRAVENOUS at 16:30

## 2023-07-10 RX ADMIN — DEXAMETHASONE SODIUM PHOSPHATE 10 MG: 10 INJECTION INTRAMUSCULAR; INTRAVENOUS at 09:17

## 2023-07-10 RX ADMIN — LEVETIRACETAM 500 MG: 100 INJECTION INTRAVENOUS at 08:16

## 2023-07-10 RX ADMIN — SODIUM CHLORIDE 5 MG/HR: 9 INJECTION, SOLUTION INTRAVENOUS at 17:32

## 2023-07-10 RX ADMIN — THIAMINE HYDROCHLORIDE 100 MG: 100 INJECTION, SOLUTION INTRAMUSCULAR; INTRAVENOUS at 21:54

## 2023-07-10 RX ADMIN — FENTANYL CITRATE 50 MCG: 50 INJECTION, SOLUTION INTRAMUSCULAR; INTRAVENOUS at 06:41

## 2023-07-10 RX ADMIN — LORAZEPAM 0.5 MG: 2 INJECTION INTRAMUSCULAR; INTRAVENOUS at 11:40

## 2023-07-10 RX ADMIN — LABETALOL HYDROCHLORIDE 10 MG: 5 INJECTION INTRAVENOUS at 10:21

## 2023-07-10 RX ADMIN — FENTANYL CITRATE 50 MCG: 50 INJECTION, SOLUTION INTRAMUSCULAR; INTRAVENOUS at 18:34

## 2023-07-10 RX ADMIN — SODIUM CHLORIDE, PRESERVATIVE FREE 10 ML: 5 INJECTION INTRAVENOUS at 08:16

## 2023-07-10 RX ADMIN — MIDAZOLAM HYDROCHLORIDE 1 MG: 1 INJECTION, SOLUTION INTRAMUSCULAR; INTRAVENOUS at 18:29

## 2023-07-10 RX ADMIN — Medication 5000 UNITS: at 18:55

## 2023-07-10 RX ADMIN — FENTANYL CITRATE 25 MCG: 50 INJECTION, SOLUTION INTRAMUSCULAR; INTRAVENOUS at 18:29

## 2023-07-10 RX ADMIN — DEXAMETHASONE SODIUM PHOSPHATE 2 MG: 4 INJECTION, SOLUTION INTRAMUSCULAR; INTRAVENOUS at 23:26

## 2023-07-10 RX ADMIN — SODIUM CHLORIDE 15 ML/HR: 3 INJECTION, SOLUTION INTRAVENOUS at 20:16

## 2023-07-10 RX ADMIN — LIDOCAINE HYDROCHLORIDE 6 ML: 20 INJECTION, SOLUTION INFILTRATION; PERINEURAL at 18:26

## 2023-07-10 RX ADMIN — DEXAMETHASONE SODIUM PHOSPHATE 6 MG: 4 INJECTION, SOLUTION INTRAMUSCULAR; INTRAVENOUS at 17:39

## 2023-07-10 RX ADMIN — LIDOCAINE HYDROCHLORIDE 3 ML: 20 INJECTION, SOLUTION INFILTRATION; PERINEURAL at 18:25

## 2023-07-10 RX ADMIN — SODIUM CHLORIDE 40 MG: 9 INJECTION INTRAMUSCULAR; INTRAVENOUS; SUBCUTANEOUS at 17:39

## 2023-07-10 RX ADMIN — HYDRALAZINE HYDROCHLORIDE 5 MG: 20 INJECTION INTRAMUSCULAR; INTRAVENOUS at 23:27

## 2023-07-10 RX ADMIN — FENTANYL CITRATE 25 MCG: 50 INJECTION, SOLUTION INTRAMUSCULAR; INTRAVENOUS at 18:24

## 2023-07-10 RX ADMIN — MIDAZOLAM HYDROCHLORIDE 0.5 MG: 1 INJECTION, SOLUTION INTRAMUSCULAR; INTRAVENOUS at 19:11

## 2023-07-10 RX ADMIN — MIDAZOLAM HYDROCHLORIDE 2 MG: 1 INJECTION, SOLUTION INTRAMUSCULAR; INTRAVENOUS at 18:16

## 2023-07-10 RX ADMIN — LABETALOL HYDROCHLORIDE 10 MG: 5 INJECTION INTRAVENOUS at 16:45

## 2023-07-10 RX ADMIN — LABETALOL HYDROCHLORIDE 5 MG: 5 INJECTION INTRAVENOUS at 23:34

## 2023-07-10 RX ADMIN — SODIUM CHLORIDE, PRESERVATIVE FREE 10 ML: 5 INJECTION INTRAVENOUS at 20:18

## 2023-07-10 RX ADMIN — LABETALOL HYDROCHLORIDE 10 MG: 5 INJECTION INTRAVENOUS at 13:56

## 2023-07-10 RX ADMIN — FENTANYL CITRATE 50 MCG: 50 INJECTION INTRAMUSCULAR; INTRAVENOUS at 06:41

## 2023-07-10 RX ADMIN — LEVETIRACETAM 500 MG: 100 INJECTION INTRAVENOUS at 21:10

## 2023-07-10 RX ADMIN — IOPAMIDOL 75 ML: 755 INJECTION, SOLUTION INTRAVENOUS at 06:36

## 2023-07-10 RX ADMIN — ONDANSETRON HYDROCHLORIDE 8 MG: 2 INJECTION, SOLUTION INTRAMUSCULAR; INTRAVENOUS at 18:20

## 2023-07-10 RX ADMIN — IOPAMIDOL 110 ML: 612 INJECTION, SOLUTION INTRAVENOUS at 19:21

## 2023-07-10 RX ADMIN — ONDANSETRON 4 MG: 2 INJECTION INTRAMUSCULAR; INTRAVENOUS at 16:44

## 2023-07-10 RX ADMIN — ACETAMINOPHEN 650 MG: 325 TABLET ORAL at 22:03

## 2023-07-10 RX ADMIN — LABETALOL HYDROCHLORIDE 5 MG: 5 INJECTION INTRAVENOUS at 22:07

## 2023-07-10 RX ADMIN — LABETALOL HYDROCHLORIDE 10 MG: 5 INJECTION INTRAVENOUS at 10:45

## 2023-07-10 RX ADMIN — GADOTERIDOL 17 ML: 279.3 INJECTION, SOLUTION INTRAVENOUS at 12:14

## 2023-07-10 RX ADMIN — ONDANSETRON 4 MG: 2 INJECTION INTRAMUSCULAR; INTRAVENOUS at 09:17

## 2023-07-10 ASSESSMENT — PAIN DESCRIPTION - PAIN TYPE: TYPE: ACUTE PAIN

## 2023-07-10 ASSESSMENT — PAIN DESCRIPTION - DESCRIPTORS
DESCRIPTORS: ACHING
DESCRIPTORS: SHOOTING;SHARP;THROBBING

## 2023-07-10 ASSESSMENT — PAIN DESCRIPTION - LOCATION
LOCATION: HEAD
LOCATION: HEAD;NECK

## 2023-07-10 ASSESSMENT — PAIN DESCRIPTION - ORIENTATION
ORIENTATION: RIGHT;LEFT
ORIENTATION: LEFT;RIGHT
ORIENTATION: RIGHT;LEFT

## 2023-07-10 ASSESSMENT — ENCOUNTER SYMPTOMS
NAUSEA: 1
ABDOMINAL PAIN: 0
VOMITING: 1
PHOTOPHOBIA: 0
SHORTNESS OF BREATH: 0
TROUBLE SWALLOWING: 0

## 2023-07-10 ASSESSMENT — PAIN - FUNCTIONAL ASSESSMENT
PAIN_FUNCTIONAL_ASSESSMENT: 0-10
PAIN_FUNCTIONAL_ASSESSMENT: 0-10

## 2023-07-10 ASSESSMENT — PAIN SCALES - GENERAL
PAINLEVEL_OUTOF10: 8
PAINLEVEL_OUTOF10: 10
PAINLEVEL_OUTOF10: 0
PAINLEVEL_OUTOF10: 5
PAINLEVEL_OUTOF10: 8
PAINLEVEL_OUTOF10: 7

## 2023-07-10 ASSESSMENT — PAIN DESCRIPTION - FREQUENCY: FREQUENCY: CONTINUOUS

## 2023-07-10 NOTE — ED NOTES
Radiology Procedure Waiver   Name: Radha Shaffer  : 1975  MRN: [id number]    Date:  7/10/23    Time: 5:19 AM EDT    Benefits of immediately proceeding with Radiology exam(s) without pre-testing outweigh the risks or are not indicated as specified below and therefore the following is/are being waived:    [x] Pregnancy test   [] Patients LMP on-time and regular.   [] Patient had Tubal Ligation or has other Contraception Device. [] Patient  is Menopausal or Premenarcheal.    [] Patient had Full or Partial Hysterectomy. [] Protocol for Iodine allergy    [] MRI Questionnaire     [x] BUN/Creatinine   [] Patient age w/no hx of renal dysfunction. [] Patient on Dialysis. [] Recent Normal Labs.   Electronically signed by Taylor Parrish MD on 7/10/23 at 5:19 AM EDT               Taylor Parrish MD  07/10/23 0650

## 2023-07-10 NOTE — CONSULTS
NEUROSURGERY INPATIENT CONSULT NOTE    Chief Complaint: Headache    HPI:   Tri Sandoval is a 51 y.o.  female who presents to the ER for evaluation of headache. She states this headache started yesterday afternoon. She describes this pain as a constant pain on her right side of her head. She admits to associates nausea, vomiting and photophobia. She also admits weakness. She denies any numbness or recent weight loss. On exam patient is somnolent, when awake patient is A&Ox 3 and answers all questions. She is a current smoker and admits to smoking marijuana. CTA Head with IPH which is why Neurosurgery was consulted.      Past Medical History:   Diagnosis Date    Arthritis     back    Asthma     Hypertension     ran out of meds    SAUL (obstructive sleep apnea)     uses cpap     Past Surgical History:   Procedure Laterality Date    BUNIONECTOMY Left 8/9/13    jovan    DILATION AND CURETTAGE OF UTERUS N/A 2/15/2021    HYSTEROSCOPY DILATATION AND CURETTAGE/ENDOMETRIAL ABLATION performed by Rupert Alexander MD at [address] [place] [address] Right 1/30/2014    jovan bunionectomy right foot    OTHER SURGICAL HISTORY Right 02/09/2018    internal ank.e stabilization    TUBAL LIGATION        Family History   Problem Relation Age of Onset    High Blood Pressure Mother     Breast Cancer Mother     Cancer Mother         uterine    Heart Attack Mother     Diabetes Father         Medications:   Current Facility-Administered Medications   Medication Dose Route Frequency Provider Last Rate Last Admin    acetaminophen (TYLENOL) tablet 650 mg  650 mg Oral Q4H PRN Ulices Summers DO        ondansetron (ZOFRAN-ODT) disintegrating tablet 4 mg  4 mg Oral Q8H PRN Ulices Summers DO        Or    ondansetron [facility]) injection 4 mg  4 mg IntraVENous Q6H PRN Ulices Summers DO        levETIRAcetam (KEPPRA) injection 500 mg  500 mg IntraVENous Q12H Ulices Summers DO   500 mg at 07/10/23 0816    labetalol (NORMODYNE;TRANDATE)

## 2023-07-10 NOTE — ED NOTES
----- Message from Yuli Sahra sent at 8/5/2021 12:40 PM EDT -----  Subject: Appointment Request    Reason for Call: Urgent (Patient Request) No Script    QUESTIONS  Type of Appointment? Established Patient  Reason for appointment request? Available appointments did not meet   patient need  Additional Information for Provider? pt was seen two days ago about pain   in pt right leg and you x ray done and would like pcp to further discuss   those x rays with pt because pt is still experiencing pain in her right   leg and also is beginning to feel pain her left leg as well around her   knee and shin. pt would like to schedule an appointment as soon as   possible.   ---------------------------------------------------------------------------  --------------  CALL BACK INFO  What is the best way for the office to contact you? OK to leave message on   voicemail  Preferred Call Back Phone Number? [phone]  ---------------------------------------------------------------------------  --------------  SCRIPT ANSWERS  Relationship to Patient? Self  (Is the patient requesting to see the provider for a procedure?)? No  (Is the patient requesting to see the provider urgently  today or   tomorrow. )? Yes  Have you been diagnosed with, awaiting test results for, or told that you   are suspected of having COVID-19 (Coronavirus)? (If patient has tested   negative or was tested as a requirement for work, school, or travel and   not based on symptoms, answer no)? No  Do you currently have flu-like symptoms including fever or chills, cough,   shortness of breath, difficulty breathing, or new loss of taste or smell? No  Have you had close contact with someone with COVID-19 in the last 14 days? No  (Service Expert  click yes below to proceed with Gynzy As Usual   Scheduling)?  Yes Patient had one episode of emesis at this time     Lexx Guerrero RN  07/10/23 2286

## 2023-07-10 NOTE — H&P
identified. POSTERIOR CIRCULATION: The distal vertebral arteries are normal in appearance. The basilar artery is normal.  No significant stenosis or aneurysm is identified. The posterior cerebral arteries are normal in appearance. OTHER: No dural venous sinus thrombosis on this non-dedicated study. No vascular malformation is identified. 1. Acute 2.5 x 2.0 cm intraparenchymal hemorrhage within the septum pellucidum demonstrating intraventricular extension and mild hydrocephalus. 2. Unremarkable CTA of the head and neck. Assessment and Plan  Patient is a 51 y.o. female who presented with headache. The active problem list is as follows:    Acute 2.5 x 2.0 cm intraparenchymal hemorrhage- within the septum pellucidum demonstrating intraventricular extension and mild hydrocephalus. Admit to neuro ICU. Neurosurgery consulted from the ER. MRI pending. Keep BP less than 140. Neurochecks. No AC/AP. Keppra for 7 days  Hypertension-has been out of hypertension medicine for about a month  Hyperglycemia-check hemoglobin A1c    Routine labs in the morning. DVT prophylaxis. Please see orders for further management and care.         Mary Carmen Waldrop DO    7:43 AM  7/10/2023

## 2023-07-10 NOTE — PROCEDURES
Patient Name: Ant Levine   Medical Record Number: [id number]  Date: 7/10/2023   Time: 7:38 PM   Room/Bed: [address] [place] [address]  Central Line Placement Procedure Note  Indication:                 3% hypertonic saline administration                                                                              Consent: Unable to be obtained due to the emergent nature of this procedure. Procedure: The patient was positioned appropriately and the skin over the right femoral vein was prepped with betadine and draped in a sterile fashion. Local anesthesia was obtained by infiltration using 1% Lidocaine without epinephrine. A large bore needle was used to identify the vein. A guide wire was then inserted into the vein through the needle. A triple lumen catheter was then inserted into the vessel over the guide wire using the Seldinger technique. All ports showed good, free flowing blood return and were flushed with saline solution. The catheter was then securely fastened to the skin with sutures and covered with a sterile dressing. A post procedure X-ray was ordered and showed good line position. The patient tolerated the procedure well.     Complications: None    Electronically Signed by: @garrick@

## 2023-07-10 NOTE — ED NOTES
This RN spoke with Krissy Kessler in MRI to determine time of MRI. She said it will most likely be this afternoon or evening. I explained that this patient will need to come with a RN d/t ICU status and condition. She said they will give us a call before they are ready.      Gómez Rand RN  07/10/23 9772

## 2023-07-10 NOTE — VIRTUAL HEALTH
Consults        This is a 51year old female transfer from OSH . I was consulted as intial radiology verbal reports suspicion for vascular malformation     CT_ head IVH and IPH - NO SAH  CTA - no AVM, NO aneurysm     Assessment  ICH ICH score of 1    Plan   No Avm or aneurysm on CTA  SBP <140   INTERACT 3 protocol, BG<180 and Tmax <38C  Neurosurgery are aware of the patient, please re consult if needed. Discussed with Elizabeth Rincon, was and interprofessional telephone consultation for emergent care as a stroke alert for this patient. History and images and treatment plan of care was discussed in multiple phone calls to take care of this patient emergently    The patient was located at [facility] ([address] [place] [address]): [address] [place] [address] [facility]  Loc: [phone]  The provider was located at Home (City/State): [facility]     Total time spent on this encounter:  32 min     --Nicanor Paniagua MD on 7/10/2023 at 2:36 PM    An electronic signature was used to authenticate this note.

## 2023-07-10 NOTE — ED PROVIDER NOTES
HPI:  7/10/23, Time: 5:21 AM EDT         Radha Shaffer is a 51 y.o. female hx  of hypertension history of asthma history of obstructive sleep apnea and arthritis presenting to the ED for headache, beginning yesterday afternoon at 1 pm ago. The complaint has been persistent, moderate in severity, and worsened by nothing. Reporting headache that started in the right side of her neck going into her right side of her head with nausea vomiting and photophobia. Patient reports no fever no chills she reports mild chest pain midsternal describes as a cramping sensation as well. Patient reporting on and off nausea vomiting as well as photophobia. Patient reporting no new weakness. Patient reporting no abdominal pain she reports no calf pain or leg swelling. Patient was seen at outlying facility was sent here due to CT showing hemorrhage intraventricular . Patient was sent here for neurosurgery evaluation. Patient reporting no trauma or injury. She has a history of hypertension has not been taking her blood pressure medication for the past month. ROS:   Pertinent positives and negatives are stated within HPI, all other systems reviewed and are negative.  --------------------------------------------- PAST HISTORY ---------------------------------------------  Past Medical History:  has a past medical history of Arthritis, Asthma, Hypertension, and SAUL (obstructive sleep apnea). Past Surgical History:  has a past surgical history that includes Tubal ligation; Bunionectomy (Left, 8/9/13); Foot surgery (Right, 1/30/2014); other surgical history (Right, 02/09/2018); Dilation and curettage of uterus (N/A, 2/15/2021); and Femoral line (7/10/2023). Social History:  reports that she has been smoking cigarettes. She has a 3.75 pack-year smoking history. She has never used smokeless tobacco. She reports current alcohol use of about 4.0 standard drinks per week. She reports current drug use.  Drug: Marijuana

## 2023-07-11 ENCOUNTER — APPOINTMENT (OUTPATIENT)
Dept: CT IMAGING | Age: 48
DRG: 044 | End: 2023-07-11
Payer: COMMERCIAL

## 2023-07-11 LAB
ALBUMIN SERPL-MCNC: 3.5 G/DL (ref 3.5–5.2)
ALP SERPL-CCNC: 100 U/L (ref 35–104)
ALT SERPL-CCNC: 11 U/L (ref 0–32)
AMPHET UR QL SCN: NOT DETECTED
ANION GAP SERPL CALCULATED.3IONS-SCNC: 8 MMOL/L (ref 7–16)
AST SERPL-CCNC: 10 U/L (ref 0–31)
BARBITURATES UR QL SCN: NOT DETECTED
BENZODIAZ UR QL SCN: POSITIVE
BILIRUB SERPL-MCNC: 0.3 MG/DL (ref 0–1.2)
BUN SERPL-MCNC: 9 MG/DL (ref 6–20)
CALCIUM SERPL-MCNC: 8.5 MG/DL (ref 8.6–10.2)
CANNABINOIDS UR QL SCN: POSITIVE
CHLORIDE SERPL-SCNC: 104 MMOL/L (ref 98–107)
CHOLESTEROL, TOTAL: 201 MG/DL (ref 0–199)
CO2 SERPL-SCNC: 22 MMOL/L (ref 22–29)
COCAINE UR QL SCN: NOT DETECTED
CREAT SERPL-MCNC: 0.7 MG/DL (ref 0.5–1)
DRUG SCREEN COMMENT UR-IMP: ABNORMAL
ERYTHROCYTE [DISTWIDTH] IN BLOOD BY AUTOMATED COUNT: 13.4 FL (ref 11.5–15)
FENTANYL SCREEN, URINE: NOT DETECTED
GLUCOSE SERPL-MCNC: 135 MG/DL (ref 74–99)
HBA1C MFR BLD: 5.1 % (ref 4–5.6)
HCT VFR BLD AUTO: 37.9 % (ref 34–48)
HDLC SERPL-MCNC: 80 MG/DL
HGB BLD-MCNC: 12.2 G/DL (ref 11.5–15.5)
LDLC SERPL CALC-MCNC: 106 MG/DL (ref 0–99)
MAGNESIUM SERPL-MCNC: 1.9 MG/DL (ref 1.6–2.6)
MCH RBC QN AUTO: 29.8 PG (ref 26–35)
MCHC RBC AUTO-ENTMCNC: 32.2 % (ref 32–34.5)
MCV RBC AUTO: 92.4 FL (ref 80–99.9)
METHADONE UR QL SCN: NOT DETECTED
OPIATES UR QL SCN: NOT DETECTED
OXYCODONE URINE: NOT DETECTED
PCP UR QL SCN: NOT DETECTED
PHOSPHATE SERPL-MCNC: 2.5 MG/DL (ref 2.5–4.5)
PLATELET # BLD AUTO: 312 E9/L (ref 130–450)
PMV BLD AUTO: 9.7 FL (ref 7–12)
POTASSIUM SERPL-SCNC: 3.6 MMOL/L (ref 3.5–5)
PROT SERPL-MCNC: 6.5 G/DL (ref 6.4–8.3)
RBC # BLD AUTO: 4.1 E12/L (ref 3.5–5.5)
SODIUM SERPL-SCNC: 134 MMOL/L (ref 132–146)
SODIUM SERPL-SCNC: 135 MMOL/L (ref 132–146)
SODIUM SERPL-SCNC: 137 MMOL/L (ref 132–146)
SODIUM SERPL-SCNC: 138 MMOL/L (ref 132–146)
TRIGL SERPL-MCNC: 73 MG/DL (ref 0–149)
TSH SERPL-MCNC: 0.25 UIU/ML (ref 0.27–4.2)
VLDLC SERPL CALC-MCNC: 15 MG/DL
WBC # BLD: 14.4 E9/L (ref 4.5–11.5)

## 2023-07-11 PROCEDURE — 99231 SBSQ HOSP IP/OBS SF/LOW 25: CPT | Performed by: NURSE PRACTITIONER

## 2023-07-11 PROCEDURE — 2580000003 HC RX 258: Performed by: NEUROLOGICAL SURGERY

## 2023-07-11 PROCEDURE — 6370000000 HC RX 637 (ALT 250 FOR IP): Performed by: NEUROLOGICAL SURGERY

## 2023-07-11 PROCEDURE — 6360000002 HC RX W HCPCS: Performed by: NEUROLOGICAL SURGERY

## 2023-07-11 PROCEDURE — C9113 INJ PANTOPRAZOLE SODIUM, VIA: HCPCS | Performed by: NEUROLOGICAL SURGERY

## 2023-07-11 PROCEDURE — 92523 SPEECH SOUND LANG COMPREHEN: CPT | Performed by: SPEECH-LANGUAGE PATHOLOGIST

## 2023-07-11 PROCEDURE — 84443 ASSAY THYROID STIM HORMONE: CPT

## 2023-07-11 PROCEDURE — 83735 ASSAY OF MAGNESIUM: CPT

## 2023-07-11 PROCEDURE — 92610 EVALUATE SWALLOWING FUNCTION: CPT | Performed by: SPEECH-LANGUAGE PATHOLOGIST

## 2023-07-11 PROCEDURE — 80061 LIPID PANEL: CPT

## 2023-07-11 PROCEDURE — 84100 ASSAY OF PHOSPHORUS: CPT

## 2023-07-11 PROCEDURE — 97129 THER IVNTJ 1ST 15 MIN: CPT | Performed by: SPEECH-LANGUAGE PATHOLOGIST

## 2023-07-11 PROCEDURE — 6370000000 HC RX 637 (ALT 250 FOR IP): Performed by: NURSE PRACTITIONER

## 2023-07-11 PROCEDURE — 2000000000 HC ICU R&B

## 2023-07-11 PROCEDURE — 80053 COMPREHEN METABOLIC PANEL: CPT

## 2023-07-11 PROCEDURE — 36592 COLLECT BLOOD FROM PICC: CPT

## 2023-07-11 PROCEDURE — 6360000002 HC RX W HCPCS: Performed by: INTERNAL MEDICINE

## 2023-07-11 PROCEDURE — 85027 COMPLETE CBC AUTOMATED: CPT

## 2023-07-11 PROCEDURE — 70450 CT HEAD/BRAIN W/O DYE: CPT

## 2023-07-11 PROCEDURE — 2580000003 HC RX 258: Performed by: INTERNAL MEDICINE

## 2023-07-11 PROCEDURE — 2580000003 HC RX 258: Performed by: PSYCHIATRY & NEUROLOGY

## 2023-07-11 PROCEDURE — 6370000000 HC RX 637 (ALT 250 FOR IP): Performed by: PSYCHIATRY & NEUROLOGY

## 2023-07-11 PROCEDURE — A4216 STERILE WATER/SALINE, 10 ML: HCPCS | Performed by: NEUROLOGICAL SURGERY

## 2023-07-11 PROCEDURE — 6360000002 HC RX W HCPCS: Performed by: PSYCHIATRY & NEUROLOGY

## 2023-07-11 PROCEDURE — 84295 ASSAY OF SERUM SODIUM: CPT

## 2023-07-11 PROCEDURE — 6360000002 HC RX W HCPCS: Performed by: NURSE PRACTITIONER

## 2023-07-11 PROCEDURE — 2500000003 HC RX 250 WO HCPCS: Performed by: PSYCHIATRY & NEUROLOGY

## 2023-07-11 PROCEDURE — 83036 HEMOGLOBIN GLYCOSYLATED A1C: CPT

## 2023-07-11 RX ORDER — BUTALBITAL, ACETAMINOPHEN AND CAFFEINE 50; 325; 40 MG/1; MG/1; MG/1
1 TABLET ORAL EVERY 4 HOURS PRN
Status: DISCONTINUED | OUTPATIENT
Start: 2023-07-11 | End: 2023-07-13 | Stop reason: HOSPADM

## 2023-07-11 RX ORDER — AMLODIPINE BESYLATE 10 MG/1
10 TABLET ORAL DAILY
Status: DISCONTINUED | OUTPATIENT
Start: 2023-07-11 | End: 2023-07-13 | Stop reason: HOSPADM

## 2023-07-11 RX ORDER — MAGNESIUM SULFATE IN WATER 40 MG/ML
2000 INJECTION, SOLUTION INTRAVENOUS ONCE
Status: COMPLETED | OUTPATIENT
Start: 2023-07-11 | End: 2023-07-11

## 2023-07-11 RX ADMIN — SODIUM CHLORIDE, PRESERVATIVE FREE 10 ML: 5 INJECTION INTRAVENOUS at 20:13

## 2023-07-11 RX ADMIN — LABETALOL HYDROCHLORIDE 5 MG: 5 INJECTION INTRAVENOUS at 09:00

## 2023-07-11 RX ADMIN — ACETAMINOPHEN 650 MG: 325 TABLET ORAL at 06:25

## 2023-07-11 RX ADMIN — DEXAMETHASONE SODIUM PHOSPHATE 2 MG: 4 INJECTION, SOLUTION INTRAMUSCULAR; INTRAVENOUS at 19:03

## 2023-07-11 RX ADMIN — BUTALBITAL, ACETAMINOPHEN AND CAFFEINE 1 TABLET: 325; 50; 40 TABLET ORAL at 09:15

## 2023-07-11 RX ADMIN — DEXAMETHASONE SODIUM PHOSPHATE 2 MG: 4 INJECTION, SOLUTION INTRAMUSCULAR; INTRAVENOUS at 23:46

## 2023-07-11 RX ADMIN — BUTALBITAL, ACETAMINOPHEN AND CAFFEINE 1 TABLET: 325; 50; 40 TABLET ORAL at 17:25

## 2023-07-11 RX ADMIN — FOLIC ACID 1 MG: 5 INJECTION, SOLUTION INTRAMUSCULAR; INTRAVENOUS; SUBCUTANEOUS at 11:07

## 2023-07-11 RX ADMIN — THIAMINE HYDROCHLORIDE 100 MG: 100 INJECTION, SOLUTION INTRAMUSCULAR; INTRAVENOUS at 09:04

## 2023-07-11 RX ADMIN — BUTALBITAL, ACETAMINOPHEN AND CAFFEINE 1 TABLET: 325; 50; 40 TABLET ORAL at 13:15

## 2023-07-11 RX ADMIN — BUTALBITAL, ACETAMINOPHEN AND CAFFEINE 1 TABLET: 325; 50; 40 TABLET ORAL at 22:09

## 2023-07-11 RX ADMIN — ACETAMINOPHEN 650 MG: 325 TABLET ORAL at 02:25

## 2023-07-11 RX ADMIN — DEXAMETHASONE SODIUM PHOSPHATE 2 MG: 4 INJECTION, SOLUTION INTRAMUSCULAR; INTRAVENOUS at 06:25

## 2023-07-11 RX ADMIN — DEXAMETHASONE SODIUM PHOSPHATE 2 MG: 4 INJECTION, SOLUTION INTRAMUSCULAR; INTRAVENOUS at 13:15

## 2023-07-11 RX ADMIN — LABETALOL HYDROCHLORIDE 5 MG: 5 INJECTION INTRAVENOUS at 02:18

## 2023-07-11 RX ADMIN — AMLODIPINE BESYLATE 10 MG: 10 TABLET ORAL at 13:15

## 2023-07-11 RX ADMIN — SODIUM CHLORIDE, PRESERVATIVE FREE 10 ML: 5 INJECTION INTRAVENOUS at 20:14

## 2023-07-11 RX ADMIN — LEVETIRACETAM 500 MG: 100 INJECTION INTRAVENOUS at 20:12

## 2023-07-11 RX ADMIN — LABETALOL HYDROCHLORIDE 5 MG: 5 INJECTION INTRAVENOUS at 23:18

## 2023-07-11 RX ADMIN — LEVETIRACETAM 500 MG: 100 INJECTION INTRAVENOUS at 09:04

## 2023-07-11 RX ADMIN — SODIUM CHLORIDE 40 MG: 9 INJECTION INTRAMUSCULAR; INTRAVENOUS; SUBCUTANEOUS at 09:04

## 2023-07-11 RX ADMIN — SODIUM CHLORIDE, PRESERVATIVE FREE 10 ML: 5 INJECTION INTRAVENOUS at 11:07

## 2023-07-11 RX ADMIN — MAGNESIUM SULFATE HEPTAHYDRATE 2000 MG: 40 INJECTION, SOLUTION INTRAVENOUS at 11:06

## 2023-07-11 ASSESSMENT — PAIN SCALES - GENERAL
PAINLEVEL_OUTOF10: 4
PAINLEVEL_OUTOF10: 7
PAINLEVEL_OUTOF10: 7
PAINLEVEL_OUTOF10: 3
PAINLEVEL_OUTOF10: 8
PAINLEVEL_OUTOF10: 4
PAINLEVEL_OUTOF10: 4
PAINLEVEL_OUTOF10: 7

## 2023-07-11 ASSESSMENT — PAIN DESCRIPTION - LOCATION
LOCATION: HEAD

## 2023-07-11 ASSESSMENT — PAIN DESCRIPTION - DESCRIPTORS
DESCRIPTORS: DISCOMFORT;SHARP;PRESSURE
DESCRIPTORS: ACHING;DISCOMFORT
DESCRIPTORS: ACHING;DISCOMFORT
DESCRIPTORS: DISCOMFORT;SHARP;PRESSURE
DESCRIPTORS: DISCOMFORT;ACHING
DESCRIPTORS: DISCOMFORT;PRESSURE;SHARP

## 2023-07-11 ASSESSMENT — PAIN DESCRIPTION - ONSET
ONSET: ON-GOING

## 2023-07-11 ASSESSMENT — PAIN DESCRIPTION - PAIN TYPE
TYPE: ACUTE PAIN

## 2023-07-11 ASSESSMENT — PAIN DESCRIPTION - ORIENTATION
ORIENTATION: MID
ORIENTATION: MID
ORIENTATION: RIGHT;LEFT
ORIENTATION: MID

## 2023-07-11 ASSESSMENT — PAIN - FUNCTIONAL ASSESSMENT
PAIN_FUNCTIONAL_ASSESSMENT: PREVENTS OR INTERFERES SOME ACTIVE ACTIVITIES AND ADLS

## 2023-07-11 ASSESSMENT — PAIN DESCRIPTION - FREQUENCY
FREQUENCY: CONTINUOUS

## 2023-07-12 ENCOUNTER — APPOINTMENT (OUTPATIENT)
Dept: CT IMAGING | Age: 48
DRG: 044 | End: 2023-07-12
Payer: COMMERCIAL

## 2023-07-12 LAB
ALBUMIN SERPL-MCNC: 3.4 G/DL (ref 3.5–5.2)
ALP SERPL-CCNC: 89 U/L (ref 35–104)
ALT SERPL-CCNC: 11 U/L (ref 0–32)
ANION GAP SERPL CALCULATED.3IONS-SCNC: 6 MMOL/L (ref 7–16)
AST SERPL-CCNC: 10 U/L (ref 0–31)
BILIRUB SERPL-MCNC: 0.3 MG/DL (ref 0–1.2)
BUN SERPL-MCNC: 11 MG/DL (ref 6–20)
CA-I BLD-SCNC: 1.15 MMOL/L (ref 1.15–1.33)
CALCIUM SERPL-MCNC: 8.3 MG/DL (ref 8.6–10.2)
CHLORIDE SERPL-SCNC: 107 MMOL/L (ref 98–107)
CO2 SERPL-SCNC: 23 MMOL/L (ref 22–29)
CREAT SERPL-MCNC: 0.8 MG/DL (ref 0.5–1)
ERYTHROCYTE [DISTWIDTH] IN BLOOD BY AUTOMATED COUNT: 14 FL (ref 11.5–15)
GLUCOSE SERPL-MCNC: 117 MG/DL (ref 74–99)
HCT VFR BLD AUTO: 35.5 % (ref 34–48)
HGB BLD-MCNC: 11.4 G/DL (ref 11.5–15.5)
MAGNESIUM SERPL-MCNC: 2.2 MG/DL (ref 1.6–2.6)
MCH RBC QN AUTO: 29.8 PG (ref 26–35)
MCHC RBC AUTO-ENTMCNC: 32.1 % (ref 32–34.5)
MCV RBC AUTO: 92.7 FL (ref 80–99.9)
PHOSPHATE SERPL-MCNC: 2.5 MG/DL (ref 2.5–4.5)
PLATELET # BLD AUTO: 272 E9/L (ref 130–450)
PMV BLD AUTO: 10 FL (ref 7–12)
POTASSIUM SERPL-SCNC: 3.9 MMOL/L (ref 3.5–5)
PROT SERPL-MCNC: 6.5 G/DL (ref 6.4–8.3)
RBC # BLD AUTO: 3.83 E12/L (ref 3.5–5.5)
SODIUM SERPL-SCNC: 136 MMOL/L (ref 132–146)
SODIUM SERPL-SCNC: 139 MMOL/L (ref 132–146)
WBC # BLD: 12.1 E9/L (ref 4.5–11.5)

## 2023-07-12 PROCEDURE — C9113 INJ PANTOPRAZOLE SODIUM, VIA: HCPCS | Performed by: NEUROLOGICAL SURGERY

## 2023-07-12 PROCEDURE — 6360000002 HC RX W HCPCS: Performed by: NEUROLOGICAL SURGERY

## 2023-07-12 PROCEDURE — 83735 ASSAY OF MAGNESIUM: CPT

## 2023-07-12 PROCEDURE — 6370000000 HC RX 637 (ALT 250 FOR IP): Performed by: NURSE PRACTITIONER

## 2023-07-12 PROCEDURE — 6360000002 HC RX W HCPCS: Performed by: PSYCHIATRY & NEUROLOGY

## 2023-07-12 PROCEDURE — 6370000000 HC RX 637 (ALT 250 FOR IP): Performed by: CLINICAL NURSE SPECIALIST

## 2023-07-12 PROCEDURE — 2580000003 HC RX 258: Performed by: INTERNAL MEDICINE

## 2023-07-12 PROCEDURE — 85027 COMPLETE CBC AUTOMATED: CPT

## 2023-07-12 PROCEDURE — 97162 PT EVAL MOD COMPLEX 30 MIN: CPT

## 2023-07-12 PROCEDURE — 70450 CT HEAD/BRAIN W/O DYE: CPT

## 2023-07-12 PROCEDURE — 2580000003 HC RX 258: Performed by: PSYCHIATRY & NEUROLOGY

## 2023-07-12 PROCEDURE — 2060000000 HC ICU INTERMEDIATE R&B

## 2023-07-12 PROCEDURE — 2580000003 HC RX 258: Performed by: CLINICAL NURSE SPECIALIST

## 2023-07-12 PROCEDURE — 6370000000 HC RX 637 (ALT 250 FOR IP): Performed by: NEUROLOGICAL SURGERY

## 2023-07-12 PROCEDURE — 97166 OT EVAL MOD COMPLEX 45 MIN: CPT

## 2023-07-12 PROCEDURE — 6370000000 HC RX 637 (ALT 250 FOR IP): Performed by: PSYCHIATRY & NEUROLOGY

## 2023-07-12 PROCEDURE — 6370000000 HC RX 637 (ALT 250 FOR IP): Performed by: INTERNAL MEDICINE

## 2023-07-12 PROCEDURE — 2580000003 HC RX 258: Performed by: NEUROLOGICAL SURGERY

## 2023-07-12 PROCEDURE — 36592 COLLECT BLOOD FROM PICC: CPT

## 2023-07-12 PROCEDURE — 6360000002 HC RX W HCPCS: Performed by: INTERNAL MEDICINE

## 2023-07-12 PROCEDURE — 2500000003 HC RX 250 WO HCPCS: Performed by: PSYCHIATRY & NEUROLOGY

## 2023-07-12 PROCEDURE — 80053 COMPREHEN METABOLIC PANEL: CPT

## 2023-07-12 PROCEDURE — A4216 STERILE WATER/SALINE, 10 ML: HCPCS | Performed by: NEUROLOGICAL SURGERY

## 2023-07-12 PROCEDURE — 6360000002 HC RX W HCPCS: Performed by: CLINICAL NURSE SPECIALIST

## 2023-07-12 PROCEDURE — 84100 ASSAY OF PHOSPHORUS: CPT

## 2023-07-12 PROCEDURE — 2580000003 HC RX 258: Performed by: SURGERY

## 2023-07-12 PROCEDURE — 82330 ASSAY OF CALCIUM: CPT

## 2023-07-12 RX ORDER — LEVETIRACETAM 500 MG/1
500 TABLET ORAL 2 TIMES DAILY
Status: DISCONTINUED | OUTPATIENT
Start: 2023-07-12 | End: 2023-07-13 | Stop reason: HOSPADM

## 2023-07-12 RX ORDER — LABETALOL HYDROCHLORIDE 5 MG/ML
5 INJECTION, SOLUTION INTRAVENOUS EVERY 6 HOURS PRN
Status: DISCONTINUED | OUTPATIENT
Start: 2023-07-12 | End: 2023-07-13 | Stop reason: HOSPADM

## 2023-07-12 RX ORDER — PANTOPRAZOLE SODIUM 40 MG/1
40 TABLET, DELAYED RELEASE ORAL
Status: DISCONTINUED | OUTPATIENT
Start: 2023-07-13 | End: 2023-07-13 | Stop reason: HOSPADM

## 2023-07-12 RX ORDER — HYDRALAZINE HYDROCHLORIDE 20 MG/ML
5 INJECTION INTRAMUSCULAR; INTRAVENOUS EVERY 6 HOURS PRN
Status: DISCONTINUED | OUTPATIENT
Start: 2023-07-12 | End: 2023-07-13 | Stop reason: HOSPADM

## 2023-07-12 RX ORDER — LOSARTAN POTASSIUM 50 MG/1
25 TABLET ORAL DAILY
Status: DISCONTINUED | OUTPATIENT
Start: 2023-07-12 | End: 2023-07-13 | Stop reason: HOSPADM

## 2023-07-12 RX ORDER — HYDROCHLOROTHIAZIDE 25 MG/1
12.5 TABLET ORAL DAILY
Status: DISCONTINUED | OUTPATIENT
Start: 2023-07-12 | End: 2023-07-13 | Stop reason: HOSPADM

## 2023-07-12 RX ADMIN — LEVETIRACETAM 500 MG: 500 TABLET, FILM COATED ORAL at 19:47

## 2023-07-12 RX ADMIN — FOLIC ACID 1 MG: 5 INJECTION, SOLUTION INTRAMUSCULAR; INTRAVENOUS; SUBCUTANEOUS at 08:07

## 2023-07-12 RX ADMIN — SODIUM CHLORIDE, PRESERVATIVE FREE 10 ML: 5 INJECTION INTRAVENOUS at 19:47

## 2023-07-12 RX ADMIN — AMLODIPINE BESYLATE 10 MG: 10 TABLET ORAL at 08:07

## 2023-07-12 RX ADMIN — SODIUM CHLORIDE 25 ML/HR: 3 INJECTION, SOLUTION INTRAVENOUS at 01:51

## 2023-07-12 RX ADMIN — LEVETIRACETAM 500 MG: 100 INJECTION INTRAVENOUS at 08:08

## 2023-07-12 RX ADMIN — SODIUM CHLORIDE, PRESERVATIVE FREE 10 ML: 5 INJECTION INTRAVENOUS at 08:10

## 2023-07-12 RX ADMIN — HYDROCHLOROTHIAZIDE 12.5 MG: 25 TABLET ORAL at 13:14

## 2023-07-12 RX ADMIN — BUTALBITAL, ACETAMINOPHEN AND CAFFEINE 1 TABLET: 325; 50; 40 TABLET ORAL at 08:07

## 2023-07-12 RX ADMIN — BUTALBITAL, ACETAMINOPHEN AND CAFFEINE 1 TABLET: 325; 50; 40 TABLET ORAL at 02:09

## 2023-07-12 RX ADMIN — SODIUM CHLORIDE 40 MG: 9 INJECTION INTRAMUSCULAR; INTRAVENOUS; SUBCUTANEOUS at 08:07

## 2023-07-12 RX ADMIN — BUTALBITAL, ACETAMINOPHEN AND CAFFEINE 1 TABLET: 325; 50; 40 TABLET ORAL at 16:25

## 2023-07-12 RX ADMIN — DEXAMETHASONE SODIUM PHOSPHATE 2 MG: 4 INJECTION, SOLUTION INTRAMUSCULAR; INTRAVENOUS at 12:02

## 2023-07-12 RX ADMIN — FENTANYL CITRATE 50 MCG: 50 INJECTION, SOLUTION INTRAMUSCULAR; INTRAVENOUS at 09:14

## 2023-07-12 RX ADMIN — LABETALOL HYDROCHLORIDE 5 MG: 5 INJECTION INTRAVENOUS at 01:47

## 2023-07-12 RX ADMIN — LOSARTAN POTASSIUM 25 MG: 50 TABLET, FILM COATED ORAL at 13:14

## 2023-07-12 RX ADMIN — THIAMINE HYDROCHLORIDE 100 MG: 100 INJECTION, SOLUTION INTRAMUSCULAR; INTRAVENOUS at 08:07

## 2023-07-12 RX ADMIN — DEXAMETHASONE SODIUM PHOSPHATE 2 MG: 4 INJECTION, SOLUTION INTRAMUSCULAR; INTRAVENOUS at 06:22

## 2023-07-12 RX ADMIN — BUTALBITAL, ACETAMINOPHEN AND CAFFEINE 1 TABLET: 325; 50; 40 TABLET ORAL at 20:35

## 2023-07-12 RX ADMIN — SODIUM CHLORIDE, PRESERVATIVE FREE 10 ML: 5 INJECTION INTRAVENOUS at 08:09

## 2023-07-12 RX ADMIN — BUTALBITAL, ACETAMINOPHEN AND CAFFEINE 1 TABLET: 325; 50; 40 TABLET ORAL at 12:04

## 2023-07-12 RX ADMIN — ONDANSETRON 4 MG: 4 TABLET, ORALLY DISINTEGRATING ORAL at 08:07

## 2023-07-12 ASSESSMENT — PAIN DESCRIPTION - DESCRIPTORS
DESCRIPTORS: ACHING;DISCOMFORT
DESCRIPTORS: ACHING;DISCOMFORT;SORE
DESCRIPTORS: ACHING
DESCRIPTORS: ACHING;DISCOMFORT;SORE
DESCRIPTORS: PRESSURE;POUNDING;DISCOMFORT

## 2023-07-12 ASSESSMENT — PAIN DESCRIPTION - LOCATION
LOCATION: HEAD

## 2023-07-12 ASSESSMENT — PAIN SCALES - GENERAL
PAINLEVEL_OUTOF10: 7
PAINLEVEL_OUTOF10: 0
PAINLEVEL_OUTOF10: 5
PAINLEVEL_OUTOF10: 0
PAINLEVEL_OUTOF10: 6
PAINLEVEL_OUTOF10: 5
PAINLEVEL_OUTOF10: 7
PAINLEVEL_OUTOF10: 2

## 2023-07-12 ASSESSMENT — PAIN - FUNCTIONAL ASSESSMENT: PAIN_FUNCTIONAL_ASSESSMENT: PREVENTS OR INTERFERES SOME ACTIVE ACTIVITIES AND ADLS

## 2023-07-12 ASSESSMENT — PAIN DESCRIPTION - ORIENTATION: ORIENTATION: MID

## 2023-07-13 ENCOUNTER — APPOINTMENT (OUTPATIENT)
Dept: CT IMAGING | Age: 48
DRG: 044 | End: 2023-07-13
Attending: INTERNAL MEDICINE
Payer: COMMERCIAL

## 2023-07-13 VITALS
BODY MASS INDEX: 36.73 KG/M2 | SYSTOLIC BLOOD PRESSURE: 117 MMHG | HEIGHT: 65 IN | OXYGEN SATURATION: 97 % | WEIGHT: 220.46 LBS | HEART RATE: 76 BPM | DIASTOLIC BLOOD PRESSURE: 79 MMHG | RESPIRATION RATE: 16 BRPM | TEMPERATURE: 98.2 F

## 2023-07-13 LAB
ALBUMIN SERPL-MCNC: 3.2 G/DL (ref 3.5–5.2)
ALP SERPL-CCNC: 86 U/L (ref 35–104)
ALT SERPL-CCNC: 18 U/L (ref 0–32)
ANION GAP SERPL CALCULATED.3IONS-SCNC: 13 MMOL/L (ref 7–16)
AST SERPL-CCNC: 14 U/L (ref 0–31)
BILIRUB SERPL-MCNC: 0.3 MG/DL (ref 0–1.2)
BUN SERPL-MCNC: 10 MG/DL (ref 6–20)
CALCIUM SERPL-MCNC: 8.6 MG/DL (ref 8.6–10.2)
CHLORIDE SERPL-SCNC: 103 MMOL/L (ref 98–107)
CO2 SERPL-SCNC: 23 MMOL/L (ref 22–29)
CREAT SERPL-MCNC: 0.7 MG/DL (ref 0.5–1)
ERYTHROCYTE [DISTWIDTH] IN BLOOD BY AUTOMATED COUNT: 13.7 FL (ref 11.5–15)
GLUCOSE SERPL-MCNC: 85 MG/DL (ref 74–99)
HCT VFR BLD AUTO: 35.1 % (ref 34–48)
HGB BLD-MCNC: 11.6 G/DL (ref 11.5–15.5)
MCH RBC QN AUTO: 30.5 PG (ref 26–35)
MCHC RBC AUTO-ENTMCNC: 33 % (ref 32–34.5)
MCV RBC AUTO: 92.4 FL (ref 80–99.9)
PLATELET # BLD AUTO: 244 E9/L (ref 130–450)
PMV BLD AUTO: 10.2 FL (ref 7–12)
POTASSIUM SERPL-SCNC: 3.8 MMOL/L (ref 3.5–5)
PROT SERPL-MCNC: 6 G/DL (ref 6.4–8.3)
RBC # BLD AUTO: 3.8 E12/L (ref 3.5–5.5)
SODIUM SERPL-SCNC: 139 MMOL/L (ref 132–146)
WBC # BLD: 8.4 E9/L (ref 4.5–11.5)

## 2023-07-13 PROCEDURE — 36415 COLL VENOUS BLD VENIPUNCTURE: CPT

## 2023-07-13 PROCEDURE — 6370000000 HC RX 637 (ALT 250 FOR IP): Performed by: CLINICAL NURSE SPECIALIST

## 2023-07-13 PROCEDURE — 70450 CT HEAD/BRAIN W/O DYE: CPT

## 2023-07-13 PROCEDURE — 80053 COMPREHEN METABOLIC PANEL: CPT

## 2023-07-13 PROCEDURE — 6370000000 HC RX 637 (ALT 250 FOR IP): Performed by: INTERNAL MEDICINE

## 2023-07-13 PROCEDURE — 6360000002 HC RX W HCPCS: Performed by: CLINICAL NURSE SPECIALIST

## 2023-07-13 PROCEDURE — 2580000003 HC RX 258: Performed by: CLINICAL NURSE SPECIALIST

## 2023-07-13 PROCEDURE — 85027 COMPLETE CBC AUTOMATED: CPT

## 2023-07-13 RX ORDER — PANTOPRAZOLE SODIUM 40 MG/1
40 TABLET, DELAYED RELEASE ORAL
Qty: 30 TABLET | Refills: 3 | Status: SHIPPED | OUTPATIENT
Start: 2023-07-14

## 2023-07-13 RX ORDER — BUTALBITAL, ACETAMINOPHEN AND CAFFEINE 50; 325; 40 MG/1; MG/1; MG/1
1 TABLET ORAL EVERY 4 HOURS PRN
Qty: 50 TABLET | Refills: 0 | Status: SHIPPED | OUTPATIENT
Start: 2023-07-13

## 2023-07-13 RX ORDER — DEXAMETHASONE 2 MG/1
TABLET ORAL
Qty: 56 TABLET | Refills: 0 | Status: SHIPPED | OUTPATIENT
Start: 2023-07-13

## 2023-07-13 RX ORDER — HYDROCHLOROTHIAZIDE 12.5 MG/1
12.5 TABLET ORAL DAILY
Qty: 30 TABLET | Refills: 3 | Status: SHIPPED | OUTPATIENT
Start: 2023-07-14

## 2023-07-13 RX ORDER — PANTOPRAZOLE SODIUM 40 MG/1
40 TABLET, DELAYED RELEASE ORAL
Qty: 30 TABLET | Refills: 3 | Status: SHIPPED | OUTPATIENT
Start: 2023-07-14 | End: 2023-07-13 | Stop reason: SDUPTHER

## 2023-07-13 RX ORDER — LOSARTAN POTASSIUM 25 MG/1
25 TABLET ORAL DAILY
Qty: 30 TABLET | Refills: 3 | Status: SHIPPED | OUTPATIENT
Start: 2023-07-14

## 2023-07-13 RX ORDER — DEXAMETHASONE 2 MG/1
TABLET ORAL
Qty: 56 TABLET | Refills: 0 | Status: SHIPPED | OUTPATIENT
Start: 2023-07-13 | End: 2023-07-13 | Stop reason: SDUPTHER

## 2023-07-13 RX ORDER — HYDROCHLOROTHIAZIDE 12.5 MG/1
12.5 TABLET ORAL DAILY
Qty: 30 TABLET | Refills: 3 | Status: SHIPPED | OUTPATIENT
Start: 2023-07-14 | End: 2023-07-13 | Stop reason: SDUPTHER

## 2023-07-13 RX ORDER — BUTALBITAL, ACETAMINOPHEN AND CAFFEINE 50; 325; 40 MG/1; MG/1; MG/1
1 TABLET ORAL EVERY 4 HOURS PRN
Qty: 50 TABLET | Refills: 0 | Status: SHIPPED | OUTPATIENT
Start: 2023-07-13 | End: 2023-07-13 | Stop reason: SDUPTHER

## 2023-07-13 RX ORDER — LEVETIRACETAM 500 MG/1
500 TABLET ORAL 2 TIMES DAILY
Qty: 28 TABLET | Refills: 0 | Status: SHIPPED | OUTPATIENT
Start: 2023-07-13 | End: 2023-07-13 | Stop reason: SDUPTHER

## 2023-07-13 RX ORDER — LEVETIRACETAM 500 MG/1
500 TABLET ORAL 2 TIMES DAILY
Qty: 28 TABLET | Refills: 0 | Status: SHIPPED | OUTPATIENT
Start: 2023-07-13 | End: 2023-07-27

## 2023-07-13 RX ORDER — DEXAMETHASONE 1 MG
2 TABLET ORAL 2 TIMES DAILY
Status: DISCONTINUED | OUTPATIENT
Start: 2023-07-13 | End: 2023-07-13 | Stop reason: HOSPADM

## 2023-07-13 RX ORDER — AMLODIPINE BESYLATE 10 MG/1
10 TABLET ORAL DAILY
Qty: 30 TABLET | Refills: 3 | Status: SHIPPED | OUTPATIENT
Start: 2023-07-14 | End: 2023-07-13 | Stop reason: SDUPTHER

## 2023-07-13 RX ORDER — AMLODIPINE BESYLATE 10 MG/1
10 TABLET ORAL DAILY
Qty: 30 TABLET | Refills: 3 | Status: SHIPPED | OUTPATIENT
Start: 2023-07-14

## 2023-07-13 RX ORDER — LOSARTAN POTASSIUM 25 MG/1
25 TABLET ORAL DAILY
Qty: 30 TABLET | Refills: 3 | Status: SHIPPED | OUTPATIENT
Start: 2023-07-14 | End: 2023-07-13 | Stop reason: SDUPTHER

## 2023-07-13 RX ADMIN — BUTALBITAL, ACETAMINOPHEN AND CAFFEINE 1 TABLET: 325; 50; 40 TABLET ORAL at 14:09

## 2023-07-13 RX ADMIN — HYDROCHLOROTHIAZIDE 12.5 MG: 25 TABLET ORAL at 08:34

## 2023-07-13 RX ADMIN — LOSARTAN POTASSIUM 25 MG: 50 TABLET, FILM COATED ORAL at 08:34

## 2023-07-13 RX ADMIN — THIAMINE HYDROCHLORIDE 100 MG: 100 INJECTION, SOLUTION INTRAMUSCULAR; INTRAVENOUS at 08:35

## 2023-07-13 RX ADMIN — PANTOPRAZOLE SODIUM 40 MG: 40 TABLET, DELAYED RELEASE ORAL at 05:17

## 2023-07-13 RX ADMIN — AMLODIPINE BESYLATE 10 MG: 10 TABLET ORAL at 08:34

## 2023-07-13 RX ADMIN — SODIUM CHLORIDE, PRESERVATIVE FREE 10 ML: 5 INJECTION INTRAVENOUS at 08:36

## 2023-07-13 RX ADMIN — BUTALBITAL, ACETAMINOPHEN AND CAFFEINE 1 TABLET: 325; 50; 40 TABLET ORAL at 08:34

## 2023-07-13 RX ADMIN — LEVETIRACETAM 500 MG: 500 TABLET, FILM COATED ORAL at 08:34

## 2023-07-13 RX ADMIN — BUTALBITAL, ACETAMINOPHEN AND CAFFEINE 1 TABLET: 325; 50; 40 TABLET ORAL at 04:26

## 2023-07-13 ASSESSMENT — PAIN DESCRIPTION - LOCATION
LOCATION: HEAD
LOCATION: HEAD

## 2023-07-13 ASSESSMENT — PAIN DESCRIPTION - PAIN TYPE: TYPE: ACUTE PAIN

## 2023-07-13 ASSESSMENT — PAIN DESCRIPTION - DESCRIPTORS
DESCRIPTORS: ACHING
DESCRIPTORS: ACHING;DISCOMFORT;THROBBING

## 2023-07-13 ASSESSMENT — PAIN SCALES - GENERAL
PAINLEVEL_OUTOF10: 10
PAINLEVEL_OUTOF10: 6
PAINLEVEL_OUTOF10: 6

## 2023-07-13 NOTE — PLAN OF CARE
Problem: Discharge Planning  Goal: Discharge to home or other facility with appropriate resources  7/12/2023 0127 by Yelena Nielsen RN  Outcome: Progressing  Flowsheets (Taken 7/12/2023 0127)  Discharge to home or other facility with appropriate resources: Identify barriers to discharge with patient and caregiver  7/11/2023 1409 by Dunia Prasad RN  Outcome: Progressing     Problem: Safety - Adult  Goal: Free from fall injury  7/12/2023 0127 by Yelena Nielsen RN  Outcome: Progressing  Flowsheets (Taken 7/12/2023 0127)  Free From Fall Injury: Instruct family/caregiver on patient safety  7/11/2023 1409 by Dunia Prasad RN  Outcome: Progressing     Problem: Pain  Goal: Verbalizes/displays adequate comfort level or baseline comfort level  7/12/2023 0127 by Yelena Nielsen RN  Outcome: Progressing  Flowsheets (Taken 7/12/2023 0127)  Verbalizes/displays adequate comfort level or baseline comfort level:   Encourage patient to monitor pain and request assistance   Implement non-pharmacological measures as appropriate and evaluate response  7/11/2023 1409 by Dunia Prasad RN  Outcome: Progressing     Problem: Skin/Tissue Integrity  Goal: Absence of new skin breakdown  Description: 1. Monitor for areas of redness and/or skin breakdown  2. Assess vascular access sites hourly  3. Every 4-6 hours minimum:  Change oxygen saturation probe site  4. Every 4-6 hours:  If on nasal continuous positive airway pressure, respiratory therapy assess nares and determine need for appliance change or resting period.   7/12/2023 0127 by Yelena Nielsen RN  Outcome: Progressing  7/11/2023 1409 by Dunia Prasad RN  Outcome: Progressing     Problem: ABCDS Injury Assessment  Goal: Absence of physical injury  7/12/2023 0127 by Yelena Nielsen RN  Outcome: Progressing  Flowsheets (Taken 7/12/2023 0127)  Absence of Physical Injury: Implement safety measures based on patient assessment  7/11/2023 1409 by Dunia Prasad RN  Outcome:
Problem: Discharge Planning  Goal: Discharge to home or other facility with appropriate resources  Outcome: Progressing     Problem: Safety - Adult  Goal: Free from fall injury  Outcome: Progressing     Problem: Pain  Goal: Verbalizes/displays adequate comfort level or baseline comfort level  Outcome: Progressing     Problem: Skin/Tissue Integrity  Goal: Absence of new skin breakdown  Description: 1. Monitor for areas of redness and/or skin breakdown  2. Assess vascular access sites hourly  3. Every 4-6 hours minimum:  Change oxygen saturation probe site  4. Every 4-6 hours:  If on nasal continuous positive airway pressure, respiratory therapy assess nares and determine need for appliance change or resting period.   Outcome: Progressing     Problem: ABCDS Injury Assessment  Goal: Absence of physical injury  Outcome: Progressing     Problem: Neurosensory - Adult  Goal: Achieves stable or improved neurological status  Outcome: Progressing  Goal: Absence of seizures  Outcome: Progressing  Goal: Remains free of injury related to seizures activity  Outcome: Progressing  Goal: Achieves maximal functionality and self care  Outcome: Progressing     Problem: Chronic Conditions and Co-morbidities  Goal: Patient's chronic conditions and co-morbidity symptoms are monitored and maintained or improved  Outcome: Progressing
Problem: Neurosensory - Adult  Goal: Achieves stable or improved neurological status  Outcome: Progressing  Flowsheets (Taken 7/10/2023 2350)  Achieves stable or improved neurological status: Assess for and report changes in neurological status  Goal: Absence of seizures  Outcome: Progressing  Flowsheets (Taken 7/10/2023 2350)  Absence of seizures: Monitor for seizure activity.   If seizure occurs, document type and location of movements and any associated apnea  Goal: Remains free of injury related to seizures activity  Outcome: Progressing  Flowsheets (Taken 7/10/2023 2350)  Remains free of injury related to seizure activity: Maintain airway, patient safety  and administer oxygen as ordered  Goal: Achieves maximal functionality and self care  Outcome: Progressing  Flowsheets (Taken 7/10/2023 2350)  Achieves maximal functionality and self care: Monitor swallowing and airway patency with patient fatigue and changes in neurological status
Problem: Safety - Adult  Goal: Free from fall injury  7/12/2023 1003 by Shira Kennedy RN  Outcome: Progressing  7/12/2023 0127 by Casper Adams RN  Outcome: Progressing  Flowsheets (Taken 7/12/2023 0127)  Free From Fall Injury: Instruct family/caregiver on patient safety     Problem: Pain  Goal: Verbalizes/displays adequate comfort level or baseline comfort level  7/12/2023 1003 by Shira Kennedy RN  Outcome: Progressing  Flowsheets (Taken 7/12/2023 0800)  Verbalizes/displays adequate comfort level or baseline comfort level:   Encourage patient to monitor pain and request assistance   Assess pain using appropriate pain scale  7/12/2023 0127 by Casper Adams RN  Outcome: Progressing  Flowsheets (Taken 7/12/2023 0127)  Verbalizes/displays adequate comfort level or baseline comfort level:   Encourage patient to monitor pain and request assistance   Implement non-pharmacological measures as appropriate and evaluate response     Problem: ABCDS Injury Assessment  Goal: Absence of physical injury  7/12/2023 1003 by Shira Kennedy RN  Outcome: Progressing  7/12/2023 0127 by Casper Adams RN  Outcome: Progressing  Flowsheets (Taken 7/12/2023 0127)  Absence of Physical Injury: Implement safety measures based on patient assessment     Problem: Skin/Tissue Integrity  Goal: Absence of new skin breakdown  Description: 1. Monitor for areas of redness and/or skin breakdown  2. Assess vascular access sites hourly  3. Every 4-6 hours minimum:  Change oxygen saturation probe site  4. Every 4-6 hours:  If on nasal continuous positive airway pressure, respiratory therapy assess nares and determine need for appliance change or resting period.   7/12/2023 1003 by Shira Kennedy RN  Outcome: Progressing  7/12/2023 0127 by Casper Adams RN  Outcome: Progressing     Problem: Neurosensory - Adult  Goal: Achieves stable or improved neurological status  7/12/2023 1003 by Shira Kennedy RN  Outcome: Progressing  7/12/2023 0127 by Anna Multani
Documentation  Taken 7/12/2023 0800 by Qiana Acevedo, RN  Care Plan - Patient's Chronic Conditions and Co-Morbidity Symptoms are Monitored and Maintained or Improved: Monitor and assess patient's chronic conditions and comorbid symptoms for stability, deterioration, or improvement

## 2023-07-13 NOTE — DISCHARGE SUMMARY
Decadron with taper -Rx for DC  Hypertension amlodipine, hydrochlorothiazide, lisinopril BP control improving-noncompliant with medications as outpatient hyperglycemia-mild --hemoglobin A1c 5.1        Discharge Exam:  See progress note from today    Condition:  Stable    Disposition: home    Patient Instructions:   Current Discharge Medication List        START taking these medications    Details   butalbital-acetaminophen-caffeine (FIORICET, ESGIC) -40 MG per tablet Take 1 tablet by mouth every 4 hours as needed for Headaches  Qty: 50 tablet, Refills: 0    Associated Diagnoses: Nonintractable headache, unspecified chronicity pattern, unspecified headache type      levETIRAcetam (KEPPRA) 500 MG tablet Take 1 tablet by mouth 2 times daily for 14 days  Qty: 28 tablet, Refills: 0      losartan (COZAAR) 25 MG tablet Take 1 tablet by mouth daily  Qty: 30 tablet, Refills: 3      amLODIPine (NORVASC) 10 MG tablet Take 1 tablet by mouth daily  Qty: 30 tablet, Refills: 3      hydroCHLOROthiazide (HYDRODIURIL) 12.5 MG tablet Take 1 tablet by mouth daily  Qty: 30 tablet, Refills: 3      dexamethasone (DECADRON) 2 MG tablet 1 p.o. twice daily for 2 weeks, then 1 p.o. daily for 2 week, then 1 p.o. every other day for 2 weeks. Follow up with neurosugery prior to stopping.   Qty: 56 tablet, Refills: 0      pantoprazole (PROTONIX) 40 MG tablet Take 1 tablet by mouth every morning (before breakfast)  Qty: 30 tablet, Refills: 3           CONTINUE these medications which have NOT CHANGED    Details   aspirin 81 MG EC tablet Take 1 tablet by mouth daily           STOP taking these medications       ALBUTEROL IN Comments:   Reason for Stopping:             Activity: activity as tolerated  Diet: cardiac diet    Follow-up with PCP 1 week neurosurgery 3 weeks    Note that 35 minutes was spent in preparing discharge papers, discussing discharge with patient, staff, consultants, medication review, arranging follow up,

## 2023-07-13 NOTE — CARE COORDINATION
Met with the patient at the bedside. She walked 300 feet with therapy. Patient is appropriate for outpatient therapy at discharge. Patient is agreeable and has transportation to and from therapy. Prescription for outpatient therapy received, and provided to the patient for discharge. Anticipate discharge to home today once CT of the head completed.       Paco Mata RN.  [facility]  [phone]
The Patient and/or Patient Representative Agree with the Discharge Plan?  Yes    Marty Morel [place]  Case Management Department  Ph: [phone] Fax:

## 2023-07-13 NOTE — DISCHARGE INSTRUCTIONS
brain. This often causes sudden and severe head pain. Other symptoms sometimes include nausea and vomiting, neck pain, or vision problems. Some people pass out or have a seizure. But the most common symptom is what many people describe as \"the worst headache of my life. \"  This hemorrhage is a type of stroke. Quick treatment is needed to prevent brain damage and death. The problems caused by a subarachnoid hemorrhage depend on what part of the brain was affected and how much damage it caused. For example, there may be problems with movement, senses, speech, memory, thinking, or emotions. Stroke rehabilitation, which includes training and therapy, can help with recovery. What causes it? Most subarachnoid hemorrhages are caused when a brain aneurysm bursts. An aneurysm (say \"MJE-ayl-ndl-um\") is a bulging, weak area in the wall of an artery that supplies blood to the brain. It can be hard to know what exactly caused a brain aneurysm and why it burst. Many things can raise the risk of this, such as smoking, high blood pressure, and a family history of aneurysms. This type of hemorrhage can also be caused by many other things, including a head injury. How is it treated? The goal of treatment is to prevent brain damage, more bleeding, and other serious problems. You will likely be in the hospital's intensive care unit, where your medical team can keep a close watch on you. They will work to control your blood pressure, manage pain, and watch for symptoms of brain damage. You may have more tests to find out for sure that an aneurysm caused the bleeding. If you have an aneurysm, you may have a procedure or surgery to repair it. This can help prevent another bleeding episode. Treatments to repair an aneurysm include:  Clipping. The doctor makes cuts (incisions) in your scalp and through the bone of your skull. Then the doctor places a tiny metal clip over the weak part of the blood vessel.  This stops the flow of

## 2023-07-14 DIAGNOSIS — I61.5 INTRAVENTRICULAR HEMORRHAGE (HCC): Primary | ICD-10-CM

## 2023-07-14 DIAGNOSIS — Z01.812 PRE-PROCEDURE LAB EXAM: Primary | ICD-10-CM

## 2023-07-14 DIAGNOSIS — I61.5 INTRAVENTRICULAR HEMORRHAGE (HCC): ICD-10-CM

## 2023-07-14 DIAGNOSIS — I62.9 INTRACRANIAL HEMORRHAGE (HCC): ICD-10-CM

## 2023-08-07 PROCEDURE — 96375 TX/PRO/DX INJ NEW DRUG ADDON: CPT

## 2023-08-07 PROCEDURE — 96376 TX/PRO/DX INJ SAME DRUG ADON: CPT

## 2023-08-07 PROCEDURE — 96374 THER/PROPH/DIAG INJ IV PUSH: CPT

## 2023-08-07 PROCEDURE — 99285 EMERGENCY DEPT VISIT HI MDM: CPT

## 2023-08-07 PROCEDURE — 96365 THER/PROPH/DIAG IV INF INIT: CPT

## 2023-08-07 ASSESSMENT — PAIN - FUNCTIONAL ASSESSMENT: PAIN_FUNCTIONAL_ASSESSMENT: 0-10

## 2023-08-07 ASSESSMENT — PAIN DESCRIPTION - ORIENTATION: ORIENTATION: RIGHT;LEFT

## 2023-08-07 ASSESSMENT — LIFESTYLE VARIABLES
HOW MANY STANDARD DRINKS CONTAINING ALCOHOL DO YOU HAVE ON A TYPICAL DAY: 1 OR 2
HOW OFTEN DO YOU HAVE A DRINK CONTAINING ALCOHOL: MONTHLY OR LESS

## 2023-08-07 ASSESSMENT — PAIN DESCRIPTION - FREQUENCY: FREQUENCY: CONTINUOUS

## 2023-08-07 ASSESSMENT — PAIN SCALES - GENERAL: PAINLEVEL_OUTOF10: 10

## 2023-08-07 ASSESSMENT — PAIN DESCRIPTION - ONSET: ONSET: AWAKENED FROM SLEEP

## 2023-08-07 ASSESSMENT — PAIN DESCRIPTION - PAIN TYPE: TYPE: ACUTE PAIN

## 2023-08-07 ASSESSMENT — PAIN DESCRIPTION - LOCATION: LOCATION: HEAD;EYE

## 2023-08-08 ENCOUNTER — APPOINTMENT (OUTPATIENT)
Dept: CT IMAGING | Age: 48
End: 2023-08-08
Payer: COMMERCIAL

## 2023-08-08 ENCOUNTER — HOSPITAL ENCOUNTER (OUTPATIENT)
Age: 48
Setting detail: OBSERVATION
Discharge: HOME | End: 2023-08-10
Attending: STUDENT IN AN ORGANIZED HEALTH CARE EDUCATION/TRAINING PROGRAM | Admitting: FAMILY MEDICINE
Payer: COMMERCIAL

## 2023-08-08 DIAGNOSIS — R51.9 ACUTE INTRACTABLE HEADACHE, UNSPECIFIED HEADACHE TYPE: Primary | ICD-10-CM

## 2023-08-08 DIAGNOSIS — R51.9 OCULAR HEADACHE: ICD-10-CM

## 2023-08-08 LAB
ALBUMIN SERPL-MCNC: 3.8 G/DL (ref 3.5–5.2)
ALP SERPL-CCNC: 113 U/L (ref 35–104)
ALT SERPL-CCNC: 28 U/L (ref 0–32)
ANION GAP SERPL CALCULATED.3IONS-SCNC: 8 MMOL/L (ref 7–16)
ANION GAP SERPL CALCULATED.3IONS-SCNC: 9 MMOL/L (ref 7–16)
AST SERPL-CCNC: 15 U/L (ref 0–31)
BASOPHILS # BLD: 0.02 K/UL (ref 0–0.2)
BASOPHILS # BLD: 0.02 K/UL (ref 0–0.2)
BASOPHILS NFR BLD: 0 % (ref 0–2)
BASOPHILS NFR BLD: 0 % (ref 0–2)
BILIRUB SERPL-MCNC: <0.2 MG/DL (ref 0–1.2)
BILIRUB UR QL STRIP: NEGATIVE
BUN SERPL-MCNC: 13 MG/DL (ref 6–20)
BUN SERPL-MCNC: 16 MG/DL (ref 6–20)
CALCIUM SERPL-MCNC: 8.6 MG/DL (ref 8.6–10.2)
CALCIUM SERPL-MCNC: 9 MG/DL (ref 8.6–10.2)
CHLORIDE SERPL-SCNC: 103 MMOL/L (ref 98–107)
CHLORIDE SERPL-SCNC: 98 MMOL/L (ref 98–107)
CLARITY UR: CLEAR
CO2 SERPL-SCNC: 25 MMOL/L (ref 22–29)
CO2 SERPL-SCNC: 27 MMOL/L (ref 22–29)
COLOR UR: YELLOW
COMMENT: ABNORMAL
CREAT SERPL-MCNC: 0.6 MG/DL (ref 0.5–1)
CREAT SERPL-MCNC: 0.7 MG/DL (ref 0.5–1)
EOSINOPHIL # BLD: 0.19 K/UL (ref 0.05–0.5)
EOSINOPHIL # BLD: 0.22 K/UL (ref 0.05–0.5)
EOSINOPHILS RELATIVE PERCENT: 3 % (ref 0–6)
EOSINOPHILS RELATIVE PERCENT: 3 % (ref 0–6)
ERYTHROCYTE [DISTWIDTH] IN BLOOD BY AUTOMATED COUNT: 14.1 % (ref 11.5–15)
ERYTHROCYTE [DISTWIDTH] IN BLOOD BY AUTOMATED COUNT: 14.3 % (ref 11.5–15)
GFR SERPL CREATININE-BSD FRML MDRD: >60 ML/MIN/1.73M2
GFR SERPL CREATININE-BSD FRML MDRD: >60 ML/MIN/1.73M2
GLUCOSE SERPL-MCNC: 114 MG/DL (ref 74–99)
GLUCOSE SERPL-MCNC: 122 MG/DL (ref 74–99)
GLUCOSE UR STRIP-MCNC: NEGATIVE MG/DL
HCT VFR BLD AUTO: 37.3 % (ref 34–48)
HCT VFR BLD AUTO: 40.4 % (ref 34–48)
HGB BLD-MCNC: 12.3 G/DL (ref 11.5–15.5)
HGB BLD-MCNC: 12.8 G/DL (ref 11.5–15.5)
HGB UR QL STRIP.AUTO: NEGATIVE
IMM GRANULOCYTES # BLD AUTO: 0.08 K/UL (ref 0–0.58)
IMM GRANULOCYTES # BLD AUTO: 0.17 K/UL (ref 0–0.58)
IMM GRANULOCYTES NFR BLD: 1 % (ref 0–5)
IMM GRANULOCYTES NFR BLD: 2 % (ref 0–5)
KETONES UR STRIP-MCNC: NEGATIVE MG/DL
LEUKOCYTE ESTERASE UR QL STRIP: NEGATIVE
LYMPHOCYTES NFR BLD: 1.83 K/UL (ref 1.5–4)
LYMPHOCYTES NFR BLD: 1.84 K/UL (ref 1.5–4)
LYMPHOCYTES RELATIVE PERCENT: 22 % (ref 20–42)
LYMPHOCYTES RELATIVE PERCENT: 24 % (ref 20–42)
MAGNESIUM SERPL-MCNC: 2.1 MG/DL (ref 1.6–2.6)
MAGNESIUM SERPL-MCNC: 2.1 MG/DL (ref 1.6–2.6)
MCH RBC QN AUTO: 30 PG (ref 26–35)
MCH RBC QN AUTO: 30.4 PG (ref 26–35)
MCHC RBC AUTO-ENTMCNC: 31.7 G/DL (ref 32–34.5)
MCHC RBC AUTO-ENTMCNC: 33 G/DL (ref 32–34.5)
MCV RBC AUTO: 92.1 FL (ref 80–99.9)
MCV RBC AUTO: 94.6 FL (ref 80–99.9)
MONOCYTES NFR BLD: 0.57 K/UL (ref 0.1–0.95)
MONOCYTES NFR BLD: 0.66 K/UL (ref 0.1–0.95)
MONOCYTES NFR BLD: 8 % (ref 2–12)
MONOCYTES NFR BLD: 8 % (ref 2–12)
NEUTROPHILS NFR BLD: 64 % (ref 43–80)
NEUTROPHILS NFR BLD: 66 % (ref 43–80)
NEUTS SEG NFR BLD: 4.87 K/UL (ref 1.8–7.3)
NEUTS SEG NFR BLD: 5.44 K/UL (ref 1.8–7.3)
NITRITE UR QL STRIP: NEGATIVE
PH UR STRIP: 5.5 [PH] (ref 5–9)
PLATELET # BLD AUTO: 254 K/UL (ref 130–450)
PLATELET # BLD AUTO: 283 K/UL (ref 130–450)
PMV BLD AUTO: 9.1 FL (ref 7–12)
PMV BLD AUTO: 9.3 FL (ref 7–12)
POTASSIUM SERPL-SCNC: 3.8 MMOL/L (ref 3.5–5)
POTASSIUM SERPL-SCNC: 4.1 MMOL/L (ref 3.5–5)
PROT SERPL-MCNC: 6.9 G/DL (ref 6.4–8.3)
PROT UR STRIP-MCNC: NEGATIVE MG/DL
RBC # BLD AUTO: 4.05 M/UL (ref 3.5–5.5)
RBC # BLD AUTO: 4.27 M/UL (ref 3.5–5.5)
SODIUM SERPL-SCNC: 134 MMOL/L (ref 132–146)
SODIUM SERPL-SCNC: 136 MMOL/L (ref 132–146)
SP GR UR STRIP: >1.03 (ref 1–1.03)
UROBILINOGEN UR STRIP-ACNC: 0.2 EU/DL (ref 0–1)
WBC OTHER # BLD: 7.7 K/UL (ref 4.5–11.5)
WBC OTHER # BLD: 8.3 K/UL (ref 4.5–11.5)

## 2023-08-08 PROCEDURE — 6370000000 HC RX 637 (ALT 250 FOR IP): Performed by: FAMILY MEDICINE

## 2023-08-08 PROCEDURE — 96366 THER/PROPH/DIAG IV INF ADDON: CPT

## 2023-08-08 PROCEDURE — 2580000003 HC RX 258: Performed by: FAMILY MEDICINE

## 2023-08-08 PROCEDURE — 80048 BASIC METABOLIC PNL TOTAL CA: CPT

## 2023-08-08 PROCEDURE — 99222 1ST HOSP IP/OBS MODERATE 55: CPT | Performed by: PSYCHIATRY & NEUROLOGY

## 2023-08-08 PROCEDURE — 85025 COMPLETE CBC W/AUTO DIFF WBC: CPT

## 2023-08-08 PROCEDURE — 83735 ASSAY OF MAGNESIUM: CPT

## 2023-08-08 PROCEDURE — 96365 THER/PROPH/DIAG IV INF INIT: CPT

## 2023-08-08 PROCEDURE — 96361 HYDRATE IV INFUSION ADD-ON: CPT

## 2023-08-08 PROCEDURE — 80053 COMPREHEN METABOLIC PANEL: CPT

## 2023-08-08 PROCEDURE — 70450 CT HEAD/BRAIN W/O DYE: CPT

## 2023-08-08 PROCEDURE — 6360000002 HC RX W HCPCS: Performed by: NURSE PRACTITIONER

## 2023-08-08 PROCEDURE — 99222 1ST HOSP IP/OBS MODERATE 55: CPT | Performed by: NEUROLOGICAL SURGERY

## 2023-08-08 PROCEDURE — 81003 URINALYSIS AUTO W/O SCOPE: CPT

## 2023-08-08 PROCEDURE — 6360000002 HC RX W HCPCS: Performed by: FAMILY MEDICINE

## 2023-08-08 PROCEDURE — 6370000000 HC RX 637 (ALT 250 FOR IP): Performed by: OPHTHALMOLOGY

## 2023-08-08 PROCEDURE — G0378 HOSPITAL OBSERVATION PER HR: HCPCS

## 2023-08-08 PROCEDURE — 6370000000 HC RX 637 (ALT 250 FOR IP): Performed by: NURSE PRACTITIONER

## 2023-08-08 PROCEDURE — 2580000003 HC RX 258: Performed by: NURSE PRACTITIONER

## 2023-08-08 RX ORDER — DEXAMETHASONE SODIUM PHOSPHATE 10 MG/ML
10 INJECTION INTRAMUSCULAR; INTRAVENOUS ONCE
Status: COMPLETED | OUTPATIENT
Start: 2023-08-08 | End: 2023-08-08

## 2023-08-08 RX ORDER — DIPHENHYDRAMINE HYDROCHLORIDE 50 MG/ML
12.5 INJECTION INTRAMUSCULAR; INTRAVENOUS ONCE
Status: COMPLETED | OUTPATIENT
Start: 2023-08-08 | End: 2023-08-08

## 2023-08-08 RX ORDER — DIPHENHYDRAMINE HYDROCHLORIDE 50 MG/ML
12.5 INJECTION INTRAMUSCULAR; INTRAVENOUS EVERY 6 HOURS PRN
Status: DISCONTINUED | OUTPATIENT
Start: 2023-08-08 | End: 2023-08-10 | Stop reason: HOSPADM

## 2023-08-08 RX ORDER — ONDANSETRON 4 MG/1
4 TABLET, ORALLY DISINTEGRATING ORAL EVERY 8 HOURS PRN
Status: DISCONTINUED | OUTPATIENT
Start: 2023-08-08 | End: 2023-08-10 | Stop reason: HOSPADM

## 2023-08-08 RX ORDER — SODIUM CHLORIDE 9 MG/ML
INJECTION, SOLUTION INTRAVENOUS PRN
Status: DISCONTINUED | OUTPATIENT
Start: 2023-08-08 | End: 2023-08-10 | Stop reason: HOSPADM

## 2023-08-08 RX ORDER — METOCLOPRAMIDE HYDROCHLORIDE 5 MG/ML
10 INJECTION INTRAMUSCULAR; INTRAVENOUS ONCE
Status: COMPLETED | OUTPATIENT
Start: 2023-08-08 | End: 2023-08-08

## 2023-08-08 RX ORDER — POTASSIUM CHLORIDE 20 MEQ/1
40 TABLET, EXTENDED RELEASE ORAL PRN
Status: DISCONTINUED | OUTPATIENT
Start: 2023-08-08 | End: 2023-08-10 | Stop reason: HOSPADM

## 2023-08-08 RX ORDER — SODIUM CHLORIDE 9 MG/ML
INJECTION, SOLUTION INTRAVENOUS CONTINUOUS
Status: DISCONTINUED | OUTPATIENT
Start: 2023-08-08 | End: 2023-08-10 | Stop reason: HOSPADM

## 2023-08-08 RX ORDER — SODIUM CHLORIDE 0.9 % (FLUSH) 0.9 %
5-40 SYRINGE (ML) INJECTION PRN
Status: DISCONTINUED | OUTPATIENT
Start: 2023-08-08 | End: 2023-08-10 | Stop reason: HOSPADM

## 2023-08-08 RX ORDER — ONDANSETRON 2 MG/ML
4 INJECTION INTRAMUSCULAR; INTRAVENOUS EVERY 6 HOURS PRN
Status: DISCONTINUED | OUTPATIENT
Start: 2023-08-08 | End: 2023-08-10 | Stop reason: HOSPADM

## 2023-08-08 RX ORDER — MINERAL OIL AND WHITE PETROLATUM 150; 830 MG/G; MG/G
OINTMENT OPHTHALMIC 4 TIMES DAILY
Status: DISCONTINUED | OUTPATIENT
Start: 2023-08-08 | End: 2023-08-09

## 2023-08-08 RX ORDER — SODIUM CHLORIDE 0.9 % (FLUSH) 0.9 %
5-40 SYRINGE (ML) INJECTION EVERY 12 HOURS SCHEDULED
Status: DISCONTINUED | OUTPATIENT
Start: 2023-08-08 | End: 2023-08-10 | Stop reason: HOSPADM

## 2023-08-08 RX ORDER — LORAZEPAM 2 MG/ML
1 INJECTION INTRAMUSCULAR PRN
Status: DISCONTINUED | OUTPATIENT
Start: 2023-08-08 | End: 2023-08-10 | Stop reason: HOSPADM

## 2023-08-08 RX ORDER — LEVETIRACETAM 500 MG/1
500 TABLET ORAL 2 TIMES DAILY
Status: DISCONTINUED | OUTPATIENT
Start: 2023-08-08 | End: 2023-08-10 | Stop reason: HOSPADM

## 2023-08-08 RX ORDER — POLYETHYLENE GLYCOL 3350 17 G/17G
17 POWDER, FOR SOLUTION ORAL DAILY PRN
Status: DISCONTINUED | OUTPATIENT
Start: 2023-08-08 | End: 2023-08-10 | Stop reason: HOSPADM

## 2023-08-08 RX ORDER — TETRACAINE HYDROCHLORIDE 5 MG/ML
1 SOLUTION OPHTHALMIC ONCE
Status: COMPLETED | OUTPATIENT
Start: 2023-08-08 | End: 2023-08-08

## 2023-08-08 RX ORDER — ACETAMINOPHEN 325 MG/1
650 TABLET ORAL EVERY 6 HOURS PRN
Status: DISCONTINUED | OUTPATIENT
Start: 2023-08-08 | End: 2023-08-10 | Stop reason: HOSPADM

## 2023-08-08 RX ORDER — LOSARTAN POTASSIUM 50 MG/1
25 TABLET ORAL DAILY
Status: DISCONTINUED | OUTPATIENT
Start: 2023-08-08 | End: 2023-08-10 | Stop reason: HOSPADM

## 2023-08-08 RX ORDER — DEXAMETHASONE SODIUM PHOSPHATE 4 MG/ML
4 INJECTION, SOLUTION INTRA-ARTICULAR; INTRALESIONAL; INTRAMUSCULAR; INTRAVENOUS; SOFT TISSUE DAILY
Status: DISCONTINUED | OUTPATIENT
Start: 2023-08-09 | End: 2023-08-09

## 2023-08-08 RX ORDER — POTASSIUM CHLORIDE 7.45 MG/ML
10 INJECTION INTRAVENOUS PRN
Status: DISCONTINUED | OUTPATIENT
Start: 2023-08-08 | End: 2023-08-10 | Stop reason: HOSPADM

## 2023-08-08 RX ORDER — AMLODIPINE BESYLATE 10 MG/1
10 TABLET ORAL DAILY
Status: DISCONTINUED | OUTPATIENT
Start: 2023-08-08 | End: 2023-08-10 | Stop reason: HOSPADM

## 2023-08-08 RX ORDER — BUTALBITAL, ACETAMINOPHEN AND CAFFEINE 50; 325; 40 MG/1; MG/1; MG/1
1 TABLET ORAL EVERY 4 HOURS PRN
Status: DISCONTINUED | OUTPATIENT
Start: 2023-08-08 | End: 2023-08-10 | Stop reason: HOSPADM

## 2023-08-08 RX ORDER — ACETAMINOPHEN 650 MG/1
650 SUPPOSITORY RECTAL EVERY 6 HOURS PRN
Status: DISCONTINUED | OUTPATIENT
Start: 2023-08-08 | End: 2023-08-10 | Stop reason: HOSPADM

## 2023-08-08 RX ORDER — 0.9 % SODIUM CHLORIDE 0.9 %
500 INTRAVENOUS SOLUTION INTRAVENOUS ONCE
Status: COMPLETED | OUTPATIENT
Start: 2023-08-08 | End: 2023-08-08

## 2023-08-08 RX ORDER — PANTOPRAZOLE SODIUM 40 MG/1
40 TABLET, DELAYED RELEASE ORAL
Status: DISCONTINUED | OUTPATIENT
Start: 2023-08-08 | End: 2023-08-10 | Stop reason: HOSPADM

## 2023-08-08 RX ORDER — MAGNESIUM SULFATE IN WATER 40 MG/ML
2000 INJECTION, SOLUTION INTRAVENOUS ONCE
Status: COMPLETED | OUTPATIENT
Start: 2023-08-08 | End: 2023-08-08

## 2023-08-08 RX ORDER — HYDROCHLOROTHIAZIDE 25 MG/1
12.5 TABLET ORAL DAILY
Status: DISCONTINUED | OUTPATIENT
Start: 2023-08-08 | End: 2023-08-10 | Stop reason: HOSPADM

## 2023-08-08 RX ADMIN — METOCLOPRAMIDE 10 MG: 5 INJECTION, SOLUTION INTRAMUSCULAR; INTRAVENOUS at 01:37

## 2023-08-08 RX ADMIN — DIPHENHYDRAMINE HYDROCHLORIDE 12.5 MG: 50 INJECTION INTRAMUSCULAR; INTRAVENOUS at 01:37

## 2023-08-08 RX ADMIN — LEVETIRACETAM 500 MG: 500 TABLET, FILM COATED ORAL at 12:12

## 2023-08-08 RX ADMIN — SODIUM CHLORIDE: 9 INJECTION, SOLUTION INTRAVENOUS at 06:29

## 2023-08-08 RX ADMIN — AMLODIPINE BESYLATE 10 MG: 10 TABLET ORAL at 12:12

## 2023-08-08 RX ADMIN — LEVETIRACETAM 500 MG: 500 TABLET, FILM COATED ORAL at 20:49

## 2023-08-08 RX ADMIN — PANTOPRAZOLE SODIUM 40 MG: 40 TABLET, DELAYED RELEASE ORAL at 12:11

## 2023-08-08 RX ADMIN — TETRACAINE HYDROCHLORIDE 1 DROP: 5 SOLUTION OPHTHALMIC at 03:29

## 2023-08-08 RX ADMIN — DEXAMETHASONE SODIUM PHOSPHATE 10 MG: 10 INJECTION INTRAMUSCULAR; INTRAVENOUS at 06:26

## 2023-08-08 RX ADMIN — DIPHENHYDRAMINE HYDROCHLORIDE 12.5 MG: 50 INJECTION, SOLUTION INTRAMUSCULAR; INTRAVENOUS at 03:29

## 2023-08-08 RX ADMIN — WHITE PETROLATUM: .15; .85 OINTMENT OPHTHALMIC at 22:30

## 2023-08-08 RX ADMIN — SODIUM CHLORIDE, PRESERVATIVE FREE 10 ML: 5 INJECTION INTRAVENOUS at 12:16

## 2023-08-08 RX ADMIN — BUTALBITAL, ACETAMINOPHEN, AND CAFFEINE 1 TABLET: 50; 325; 40 TABLET ORAL at 20:49

## 2023-08-08 RX ADMIN — MAGNESIUM SULFATE HEPTAHYDRATE 2000 MG: 40 INJECTION, SOLUTION INTRAVENOUS at 06:28

## 2023-08-08 RX ADMIN — SODIUM CHLORIDE: 9 INJECTION, SOLUTION INTRAVENOUS at 18:09

## 2023-08-08 RX ADMIN — LOSARTAN POTASSIUM 25 MG: 50 TABLET, FILM COATED ORAL at 12:12

## 2023-08-08 RX ADMIN — SODIUM CHLORIDE 500 ML: 9 INJECTION, SOLUTION INTRAVENOUS at 01:36

## 2023-08-08 RX ADMIN — HYDROCHLOROTHIAZIDE 12.5 MG: 12.5 TABLET ORAL at 12:11

## 2023-08-08 ASSESSMENT — PAIN SCALES - GENERAL
PAINLEVEL_OUTOF10: 0
PAINLEVEL_OUTOF10: 0
PAINLEVEL_OUTOF10: 3

## 2023-08-08 ASSESSMENT — ENCOUNTER SYMPTOMS
BACK PAIN: 0
PHOTOPHOBIA: 1
SHORTNESS OF BREATH: 0
TROUBLE SWALLOWING: 0
ABDOMINAL PAIN: 0

## 2023-08-08 ASSESSMENT — PAIN DESCRIPTION - ORIENTATION: ORIENTATION: LEFT;RIGHT

## 2023-08-08 ASSESSMENT — PAIN - FUNCTIONAL ASSESSMENT
PAIN_FUNCTIONAL_ASSESSMENT: NONE - DENIES PAIN
PAIN_FUNCTIONAL_ASSESSMENT: ACTIVITIES ARE NOT PREVENTED

## 2023-08-08 ASSESSMENT — PAIN DESCRIPTION - LOCATION: LOCATION: EYE

## 2023-08-08 NOTE — CONSULTS
[facility]  Neurology Consult    Date:  8/8/2023  Patient Name:  Monique Lund  YOB: 1975  MRN: [id number]     PCP:  No primary care provider on file. Referring:  No ref. provider found      Chief Complaint: headache vs eye pain    History obtained from: Chart and patient    [address] is a 51 y.o. female w/ PMHx of HTN and recent intraventricular hemorrhage currently being evaluated and treated for:    Severe eye pain and photophobia likely d/t intraocular pathology. Lower concern for intracranial or neurological pathology given patient improvement with topical eye drops and benign exam on re-evaluation  Third ventricle cyst    Plan  MRI head wwo contrast  Ophtho consult for eye   Will peripherally follow for MRI results        History of Present Illness:  Monique Lund is a 51 y.o. female w/ PMHx of HTN presenting for evaluation of HA. Patient was seen at [facility] on 07/10/2023 for HA and blurry vision and found to have a large 3rd ventricle hemorrhage then was transferred to [facility] for further management. Patient received PPX keppra and tight blood pressure control during that admission. No neurosurgical procedure was recommended at that time. Pertaining to present admission to [facility] ED: Patient admitted 08/07/2023 for severe eye pain and pressure and photophobia WITHOUT headache, blurry vision, or diplopia. She also denied unilateral weakness, facial asymmetry, chest pain, SOB, fevers, neck pain, or neck stiffness. She has not been taking her oral decadron or hydrochlorothiazide for two days as she reports she ran out. Neurosurgery evaluated the patient and recommended neurology consult as patient intracranial bleed is improving since last admit. Today, patient examined at bedside. Patient received tetracaine eye drops and stated she felt a lot better.  She was much less photophobic and was able to

## 2023-08-08 NOTE — PROGRESS NOTES
The patient was admitted this morning. Please see the admission note from [address] 51 y.o. female with past medical history of  Arthritis, Asthma, Hypertension, and SAUL (obstructive sleep apnea). Patient was admitted in July 2023 due to intracranial hemorrhage  patient has yet to follow up with neurosurgery  Patient reports increased headache  with photophobia . She states hat she woke up with intense pressure behind her eyes . Patient states that ran out of oral Decadron two days ago as well as HCTZ. She is doing better this morning; she states that her pain was 3/3 from the 10/10 rating that she came to the hospital. Her photophobia is improved while being in the dark hospital room. No other complaints. No CP or SOB. No abd pain nor any n/v/f/c.        - CT head persistent but decreased hemorrhage in 3rd ventricle  Patient received  dexamethasone benadryl, reglan and fluid bolus. Currently on IVF. - Neurosurgery and Neurology consultation is pending.    - Seizure prophylaxis: Keppra   - HTN: Norvasc and losartan and patient switched to a low salt diet. Slightly elevated BS: hbA1c ordered and vitamin D ordered for elevated alk phos. - Spoke with Optho: patient better with tetracaine drops  - Patient was ordered erythromycin and eye drops (Dr. Nimesh Ca states that she spoke with nursing and orders will be placed) and can see Optho OP- I spoke with Dr. Nimesh Ca- Most likely dry eyes or ?corneal in nature if better with drops- Can F/u OP.

## 2023-08-08 NOTE — ED NOTES
Radiology Procedure Waiver   Name: Gayathri Modi  : 1975  MRN: [id number]    Date:  23    Time: 12:10 AM EDT    Benefits of immediately proceeding with Radiology exam(s) without pre-testing outweigh the risks or are not indicated as specified below and therefore the following is/are being waived:    [x] Pregnancy test   [] Patients LMP on-time and regular.   [] Patient had Tubal Ligation or has other Contraception Device. [x] Patient  is Menopausal or Premenarcheal.    [] Patient had Full or Partial Hysterectomy. [] Protocol for Iodine allergy    [] MRI Questionnaire     [] BUN/Creatinine   [] Patient age w/no hx of renal dysfunction. [] Patient on Dialysis. [] Recent Normal Labs.   Electronically signed by FABIO Jones CNP on 23 at 12:10 AM EDT              FABIO Jones CNP  23 0010

## 2023-08-08 NOTE — ED PROVIDER NOTES
ED Physician   HPI:  8/8/23, Time: 1:22 AM EDT         Faustino Schlatter is a 51 y.o. female presenting to the ED for 1 day history of severe eye pain and pressure. Patient presents to the emergency department states that she woke up this morning and has had intense pressure behind her eyes. She is denying this being a headache in denies any blurry or double vision but does report photophobia associated with this. Patient does have a history significant for an intracranial hemorrhage and was admitted July 10 through July 13. Patient has not yet followed up with neurosurgery. She reports that she ran out of her oral Decadron 2 days ago as well as her hydrochlorothiazide 2 days ago. States that she does have an appointment tomorrow with a physician, Dr. Concetta Ca, she believes it is one of the surgeons. Patient denies any unilateral weakness or any facial asymmetry and denies this being the worst headache of her life. She does report feeling nervous and upset due to the history of brain bleed less than 1 month ago. Patient otherwise denies any chest pain or shortness of breath as well as no noted fevers. She also denies any neck pain or stiffening. She denies ever having any loss of vision. Patient reports taking over-the-counter headache medicine without relief. Symptoms moderate in severity and persistent. Review of Systems:   A complete review of systems was performed and pertinent positives and negatives are stated within HPI, all other systems reviewed and are negative.          --------------------------------------------- PAST HISTORY ---------------------------------------------  Past Medical History:  has a past medical history of Arthritis, Asthma, Hypertension, and SAUL (obstructive sleep apnea). Past Surgical History:  has a past surgical history that includes Tubal ligation; Bunionectomy (Left, 8/9/13); Foot surgery (Right, 1/30/2014); other surgical history (Right, 02/09/2018);  Dilation and limited, Eyes: Exam Limited, Patient with fair relief with use of tetracaine to bilateral eyes. Still refusing staff including this provider to turn on lights to fully assess PERRLA and extraocular muscle movement. With limited light from a distance eye pressures were obtained, right eye is 8, left eye 10. Also per medical records from patient's discharge instructions patient actually has an appointment today with neurosurgery, Dr. Mic De La Torre at 1 pm   On reevaluation after receiving second dose of IV Benadryl patient reports still no resolution in symptoms. Patient still with dark sunglasses on as well as having the lights off. Patient does not have any meningeal signs, she denies fevers, no noted neck pain or stiffening , she is reporting only photophobia as well as eye pain and pressure. Patient will be admitted, call out to [facility]. Patient otherwise neurovascular and hemodynamically intact. She did receive very short-term relief. Patient will need to be seen and evaluated by neurology. I did speak with on-call neurosurgeon, Dr. Corinna Francois, he was made aware of patient's presenting symptoms, past history as well as ineffectiveness of medications. He reports that she will need to be seen by a neurologist.  Plan will be for admission, I did speak with patient she reports no significant relief in pain. Plan will be for admission, call out to [facility] physicians. Dr. Tamica Jalloh agreeable to admit patient to telemetry observation. Repeat vitals, /92, temp 97.9, heart rate 93, respiratory rate 14 and pulse ox 97% on room air. History from : Patient and Medical records     Limitations to history : None    Chronic Conditions: has a past medical history of Arthritis, Asthma, Hypertension, and SAUL (obstructive sleep apnea).     CONSULTS:Neurosurgery, plan will be for admission    Discussion with Other Profesionals : None    Social Determinants : None    Records Reviewed : Source patient and Inpatient Notes

## 2023-08-08 NOTE — ED TRIAGE NOTES
Patient to triage, with dark glasses on, with complaints of light sensitivity and extreme headache \"behind my eyes\". Patient d/c on 7/17 having had a hemorraghic stroke. Patient states symptoms are similar to when she had her stroke. Vitals stbale, nad.

## 2023-08-09 ENCOUNTER — APPOINTMENT (OUTPATIENT)
Dept: MRI IMAGING | Age: 48
End: 2023-08-09
Payer: COMMERCIAL

## 2023-08-09 DIAGNOSIS — I62.9 INTRACRANIAL HEMORRHAGE (HCC): Primary | ICD-10-CM

## 2023-08-09 LAB
25(OH)D3 SERPL-MCNC: <6 NG/ML (ref 30–100)
ANION GAP SERPL CALCULATED.3IONS-SCNC: 9 MMOL/L (ref 7–16)
BASOPHILS # BLD: 0.01 K/UL (ref 0–0.2)
BASOPHILS NFR BLD: 0 % (ref 0–2)
BUN SERPL-MCNC: 13 MG/DL (ref 6–20)
CALCIUM SERPL-MCNC: 8.5 MG/DL (ref 8.6–10.2)
CHLORIDE SERPL-SCNC: 105 MMOL/L (ref 98–107)
CO2 SERPL-SCNC: 23 MMOL/L (ref 22–29)
CREAT SERPL-MCNC: 0.6 MG/DL (ref 0.5–1)
EOSINOPHIL # BLD: 0.02 K/UL (ref 0.05–0.5)
EOSINOPHILS RELATIVE PERCENT: 0 % (ref 0–6)
ERYTHROCYTE [DISTWIDTH] IN BLOOD BY AUTOMATED COUNT: 13.9 % (ref 11.5–15)
GFR SERPL CREATININE-BSD FRML MDRD: >60 ML/MIN/1.73M2
GLUCOSE SERPL-MCNC: 126 MG/DL (ref 74–99)
HBA1C MFR BLD: 5.5 % (ref 4–5.6)
HCT VFR BLD AUTO: 35.1 % (ref 34–48)
HGB BLD-MCNC: 11.4 G/DL (ref 11.5–15.5)
IMM GRANULOCYTES # BLD AUTO: 0.1 K/UL (ref 0–0.58)
IMM GRANULOCYTES NFR BLD: 1 % (ref 0–5)
LYMPHOCYTES NFR BLD: 1.78 K/UL (ref 1.5–4)
LYMPHOCYTES RELATIVE PERCENT: 13 % (ref 20–42)
MAGNESIUM SERPL-MCNC: 2 MG/DL (ref 1.6–2.6)
MCH RBC QN AUTO: 30.4 PG (ref 26–35)
MCHC RBC AUTO-ENTMCNC: 32.5 G/DL (ref 32–34.5)
MCV RBC AUTO: 93.6 FL (ref 80–99.9)
MONOCYTES NFR BLD: 0.74 K/UL (ref 0.1–0.95)
MONOCYTES NFR BLD: 6 % (ref 2–12)
NEUTROPHILS NFR BLD: 81 % (ref 43–80)
NEUTS SEG NFR BLD: 10.91 K/UL (ref 1.8–7.3)
PHOSPHATE SERPL-MCNC: 2.8 MG/DL (ref 2.5–4.5)
PLATELET # BLD AUTO: 252 K/UL (ref 130–450)
PMV BLD AUTO: 9.4 FL (ref 7–12)
POTASSIUM SERPL-SCNC: 4 MMOL/L (ref 3.5–5)
RBC # BLD AUTO: 3.75 M/UL (ref 3.5–5.5)
SODIUM SERPL-SCNC: 137 MMOL/L (ref 132–146)
WBC OTHER # BLD: 13.6 K/UL (ref 4.5–11.5)

## 2023-08-09 PROCEDURE — G0378 HOSPITAL OBSERVATION PER HR: HCPCS

## 2023-08-09 PROCEDURE — 85025 COMPLETE CBC W/AUTO DIFF WBC: CPT

## 2023-08-09 PROCEDURE — 96361 HYDRATE IV INFUSION ADD-ON: CPT

## 2023-08-09 PROCEDURE — 82306 VITAMIN D 25 HYDROXY: CPT

## 2023-08-09 PROCEDURE — 36415 COLL VENOUS BLD VENIPUNCTURE: CPT

## 2023-08-09 PROCEDURE — 80048 BASIC METABOLIC PNL TOTAL CA: CPT

## 2023-08-09 PROCEDURE — 83735 ASSAY OF MAGNESIUM: CPT

## 2023-08-09 PROCEDURE — 6360000004 HC RX CONTRAST MEDICATION: Performed by: RADIOLOGY

## 2023-08-09 PROCEDURE — 70553 MRI BRAIN STEM W/O & W/DYE: CPT

## 2023-08-09 PROCEDURE — 99232 SBSQ HOSP IP/OBS MODERATE 35: CPT | Performed by: NURSE PRACTITIONER

## 2023-08-09 PROCEDURE — 2580000003 HC RX 258: Performed by: FAMILY MEDICINE

## 2023-08-09 PROCEDURE — 6370000000 HC RX 637 (ALT 250 FOR IP): Performed by: FAMILY MEDICINE

## 2023-08-09 PROCEDURE — A9577 INJ MULTIHANCE: HCPCS | Performed by: RADIOLOGY

## 2023-08-09 PROCEDURE — 6370000000 HC RX 637 (ALT 250 FOR IP): Performed by: INTERNAL MEDICINE

## 2023-08-09 PROCEDURE — 84100 ASSAY OF PHOSPHORUS: CPT

## 2023-08-09 PROCEDURE — 6360000002 HC RX W HCPCS

## 2023-08-09 PROCEDURE — 6360000002 HC RX W HCPCS: Performed by: FAMILY MEDICINE

## 2023-08-09 PROCEDURE — 83036 HEMOGLOBIN GLYCOSYLATED A1C: CPT

## 2023-08-09 PROCEDURE — 6370000000 HC RX 637 (ALT 250 FOR IP): Performed by: OPHTHALMOLOGY

## 2023-08-09 RX ORDER — ERYTHROMYCIN 5 MG/G
OINTMENT OPHTHALMIC NIGHTLY
Status: DISCONTINUED | OUTPATIENT
Start: 2023-08-09 | End: 2023-08-10 | Stop reason: HOSPADM

## 2023-08-09 RX ORDER — POLYVINYL ALCOHOL 14 MG/ML
1 SOLUTION/ DROPS OPHTHALMIC 4 TIMES DAILY
Status: DISCONTINUED | OUTPATIENT
Start: 2023-08-09 | End: 2023-08-10 | Stop reason: HOSPADM

## 2023-08-09 RX ORDER — ERGOCALCIFEROL 1.25 MG/1
50000 CAPSULE ORAL WEEKLY
Status: DISCONTINUED | OUTPATIENT
Start: 2023-08-09 | End: 2023-08-10 | Stop reason: HOSPADM

## 2023-08-09 RX ADMIN — PANTOPRAZOLE SODIUM 40 MG: 40 TABLET, DELAYED RELEASE ORAL at 05:10

## 2023-08-09 RX ADMIN — LOSARTAN POTASSIUM 25 MG: 50 TABLET, FILM COATED ORAL at 08:03

## 2023-08-09 RX ADMIN — POLYVINYL ALCOHOL 1 DROP: 14 SOLUTION/ DROPS OPHTHALMIC at 22:44

## 2023-08-09 RX ADMIN — SODIUM CHLORIDE: 9 INJECTION, SOLUTION INTRAVENOUS at 19:05

## 2023-08-09 RX ADMIN — HYDROCHLOROTHIAZIDE 12.5 MG: 12.5 TABLET ORAL at 08:03

## 2023-08-09 RX ADMIN — LEVETIRACETAM 500 MG: 500 TABLET, FILM COATED ORAL at 21:20

## 2023-08-09 RX ADMIN — GADOBENATE DIMEGLUMINE 20 ML: 529 INJECTION, SOLUTION INTRAVENOUS at 21:52

## 2023-08-09 RX ADMIN — LORAZEPAM 1 MG: 2 INJECTION INTRAMUSCULAR; INTRAVENOUS at 21:20

## 2023-08-09 RX ADMIN — WHITE PETROLATUM: .15; .85 OINTMENT OPHTHALMIC at 08:04

## 2023-08-09 RX ADMIN — LEVETIRACETAM 500 MG: 500 TABLET, FILM COATED ORAL at 08:03

## 2023-08-09 RX ADMIN — ERYTHROMYCIN: 5 OINTMENT OPHTHALMIC at 22:44

## 2023-08-09 RX ADMIN — SODIUM CHLORIDE, PRESERVATIVE FREE 10 ML: 5 INJECTION INTRAVENOUS at 08:04

## 2023-08-09 RX ADMIN — WHITE PETROLATUM: .15; .85 OINTMENT OPHTHALMIC at 16:17

## 2023-08-09 RX ADMIN — DEXAMETHASONE SODIUM PHOSPHATE 4 MG: 4 INJECTION INTRA-ARTICULAR; INTRALESIONAL; INTRAMUSCULAR; INTRAVENOUS; SOFT TISSUE at 08:04

## 2023-08-09 RX ADMIN — WHITE PETROLATUM: .15; .85 OINTMENT OPHTHALMIC at 12:47

## 2023-08-09 RX ADMIN — ACETAMINOPHEN 650 MG: 325 TABLET ORAL at 19:47

## 2023-08-09 RX ADMIN — AMLODIPINE BESYLATE 10 MG: 10 TABLET ORAL at 08:04

## 2023-08-09 RX ADMIN — ERGOCALCIFEROL 50000 UNITS: 1.25 CAPSULE ORAL at 11:46

## 2023-08-09 ASSESSMENT — PAIN SCALES - GENERAL
PAINLEVEL_OUTOF10: 0
PAINLEVEL_OUTOF10: 4

## 2023-08-09 ASSESSMENT — PAIN DESCRIPTION - DESCRIPTORS: DESCRIPTORS: ACHING

## 2023-08-09 ASSESSMENT — PAIN DESCRIPTION - LOCATION: LOCATION: HEAD

## 2023-08-09 NOTE — CONSULTS
Segment:  Lens:    Normal  anterior vitreous:   normal  Normal optic nerve and macula    Assessment:  Corneal surface irritation-better with artificial tears. .  To use the artificial tears 4 times a day and erythromycin at bedtime will plan on seeing in the office next week. Plan:  I explained to patient what symptoms should be rechecked. Please notify me immediately if patient develops any change in vision. Please have patient follow up with me or His/Hers ophthalmologist after discharge. My office # is [phone].   Thank you Lavelle Mercado MD

## 2023-08-09 NOTE — CARE COORDINATION
Chart reviewed and case reviewed in IDR. Met with the patient at the bedside to discuss transition of care planning. Patient lives with her fiance Afshan King in a ranch style home with two steps to enter. Patient has no DME and is independent at home. Patient does not drive. Patient goes to [place] [id number] [facility] for primary Care and goes to [facility] [address] [place] for her medications. Transition of care plan is to home and patient stated that her Fiance or father will provide transportation. MRI of the brain pending and has been cleared on MRI board since this morning for test to be completed. Will continue to follow. Clinton Vale RN.  [place]:  [phone]      Case Management Assessment  Initial Evaluation    Date/Time of Evaluation: 8/9/2023 3:38 PM  Assessment Completed by: Clinton Vale RN    If patient is discharged prior to next notation, then this note serves as note for discharge by case management. Patient Name: Magdy Burnett                   YOB: 1975  Diagnosis: Ocular headache [R51.9]  Acute intractable headache, unspecified headache type [R51.9]                   Date / Time: 8/8/2023 12:48 AM    Patient Admission Status: Observation   Readmission Risk (Low < 19, Mod (19-27), High > 27): Readmission Risk Score: 7.7    Current PCP: No primary care provider on file. PCP verified by CM? Yes ([facility])    Chart Reviewed: Yes      History Provided by: Patient  Patient Orientation: Alert and Oriented    Patient Cognition: Alert    Hospitalization in the last 30 days (Readmission):  Yes    If yes, Readmission Assessment in CM Navigator will be completed.     Advance Directives:      Code Status: Full Code   Patient's Primary Decision Maker is: Patient Declined (Legal Next of Kin Remains as Decision Maker)    Primary Decision Maker: Kenzie Arriaga - Parent - [phone]    Secondary Decision Maker: Gonzales Duque - Jaciel -

## 2023-08-09 NOTE — PROGRESS NOTES
6 [facility]  Neuro Inpatient Follow Up        Mariel Prieto is a 51 y.o. female     Neuro is following for HA vs eye pain    Significant PMH: ICH, HTN, SAUL on CPAP, asthma, smoking    HPI:  The patient was recently seen at [facility] on 07/10/2023 for HA and blurry vision and found to have a large IVH hemorrhage felt to be due to HTN. She was transferred here but no NSGY intervention needed    She returned back to the ER on 8/7 for severe eye pain and pressure and photophobia--different than her previous symptoms and without headache. She has not been taking her oral decadron or hydrochlorothiazide for two days as she reports she ran out. CT showed improvement of her ICH. Patient received tetracaine eye drops in the ER and had marked improvement in her eye pains. Ophthalmology was consulted. Subjective:  She continues to feel better today aside from mild soreness to both eyes. Ophthalmology evaluation is pending. No headache, jaw claudication, vision loss or eye pain, or other neurologic symptoms. Blood pressures are controlled.     No chest pain or palpitations  No SOB  No vertigo, lightheadedness or loss of consciousness  No falls, tripping or stumbling  No incontinence of bowels or bladder  No itching or bruising appreciated  No numbness, tingling or focal arm/leg weakness  No speech or swallowing troubles    ROS otherwise negative     Current Facility-Administered Medications   Medication Dose Route Frequency Provider Last Rate Last Admin    vitamin D (ERGOCALCIFEROL) capsule 50,000 Units  50,000 Units Oral Weekly Masoud Calle MD   50,000 Units at 08/09/23 1146    amLODIPine (NORVASC) tablet 10 mg  10 mg Oral Daily Sanjay Gastelum MD   10 mg at 08/09/23 0804    butalbital-acetaminophen-caffeine (FIORICET, ESGIC) per tablet 1 tablet  1 tablet Oral Q4H PRN Sanjay Gastelum MD   1 tablet at 08/08/23 2049    hydroCHLOROthiazide (HYDRODIURIL) tablet 12.5 AM    RDW 13.9 08/09/2023 06:53 AM    LYMPHOPCT 13 08/09/2023 06:53 AM    MONOPCT 6 08/09/2023 06:53 AM    BASOPCT 0 08/09/2023 06:53 AM    MONOSABS 0.74 08/09/2023 06:53 AM    LYMPHSABS 1.78 08/09/2023 06:53 AM    EOSABS 0.02 08/09/2023 06:53 AM    BASOSABS 0.01 08/09/2023 06:53 AM     CMP:    Lab Results   Component Value Date/Time     08/09/2023 06:53 AM    K 4.0 08/09/2023 06:53 AM     08/09/2023 06:53 AM    CO2 23 08/09/2023 06:53 AM    BUN 13 08/09/2023 06:53 AM    CREATININE 0.6 08/09/2023 06:53 AM    GFRAA >60 02/10/2021 03:24 PM    LABGLOM >60 08/09/2023 06:53 AM    GLUCOSE 126 08/09/2023 06:53 AM    PROT 6.9 08/08/2023 12:53 AM    LABALBU 3.8 08/08/2023 12:53 AM    CALCIUM 8.5 08/09/2023 06:53 AM    BILITOT <0.2 08/08/2023 12:53 AM    ALKPHOS 113 08/08/2023 12:53 AM    AST 15 08/08/2023 12:53 AM    ALT 28 08/08/2023 12:53 AM     CT head 8/8  IMPRESSION:  Persistent but decreased hemorrhage in the 3rd ventricle compared to 7/13/23. the hemorrhage in the lateral ventricles on 07/13/2023 is no longer present. MRI brain [facility] pending    All pertinent labs and images personally reviewed today    Assessment:     Bilateral eye pain and headache: Symptoms markedly improved with tetracaine drops which would be atypical for headaches secondary to [address] and suspect this may be an ophthalmologic issue.   Previous ICH was stable on CT but MRI of the brain is pending    Plan:     -MRI brain [facility] pending  -Ophthalmology eval pending    If MRI brain negative, will sign off  Has OP follow up and repeat imaging with NSGY planned    FABIO Duncan - CNP  3:15 PM  8/9/2023

## 2023-08-09 NOTE — PLAN OF CARE
Problem: Discharge Planning  Goal: Discharge to home or other facility with appropriate resources  8/8/2023 2353 by Christen Constantino RN  Outcome: Progressing  8/8/2023 2159 by Дмитрий Martinez RN  Outcome: Progressing     Problem: Pain  Goal: Verbalizes/displays adequate comfort level or baseline comfort level  8/8/2023 2353 by Christen Constantino RN  Outcome: Progressing  8/8/2023 2159 by Дмитрий Martinez RN  Outcome: Progressing     Problem: Safety - Adult  Goal: Free from fall injury  8/8/2023 2353 by Christen Constantino RN  Outcome: Progressing  8/8/2023 2159 by Дмитрий Martinez RN  Outcome: Progressing

## 2023-08-10 VITALS
WEIGHT: 221.2 LBS | HEART RATE: 82 BPM | OXYGEN SATURATION: 100 % | DIASTOLIC BLOOD PRESSURE: 88 MMHG | RESPIRATION RATE: 16 BRPM | TEMPERATURE: 97.7 F | HEIGHT: 65 IN | BODY MASS INDEX: 36.85 KG/M2 | SYSTOLIC BLOOD PRESSURE: 130 MMHG

## 2023-08-10 LAB
ANION GAP SERPL CALCULATED.3IONS-SCNC: 10 MMOL/L (ref 7–16)
BASOPHILS # BLD: 0.01 K/UL (ref 0–0.2)
BASOPHILS NFR BLD: 0 % (ref 0–2)
BUN SERPL-MCNC: 18 MG/DL (ref 6–20)
CALCIUM SERPL-MCNC: 8.5 MG/DL (ref 8.6–10.2)
CHLORIDE SERPL-SCNC: 107 MMOL/L (ref 98–107)
CO2 SERPL-SCNC: 23 MMOL/L (ref 22–29)
CREAT SERPL-MCNC: 0.8 MG/DL (ref 0.5–1)
EOSINOPHIL # BLD: 0.06 K/UL (ref 0.05–0.5)
EOSINOPHILS RELATIVE PERCENT: 1 % (ref 0–6)
ERYTHROCYTE [DISTWIDTH] IN BLOOD BY AUTOMATED COUNT: 14.1 % (ref 11.5–15)
GFR SERPL CREATININE-BSD FRML MDRD: >60 ML/MIN/1.73M2
GLUCOSE SERPL-MCNC: 97 MG/DL (ref 74–99)
HCT VFR BLD AUTO: 34.1 % (ref 34–48)
HGB BLD-MCNC: 11.2 G/DL (ref 11.5–15.5)
IMM GRANULOCYTES # BLD AUTO: 0.09 K/UL (ref 0–0.58)
IMM GRANULOCYTES NFR BLD: 1 % (ref 0–5)
LYMPHOCYTES NFR BLD: 2.84 K/UL (ref 1.5–4)
LYMPHOCYTES RELATIVE PERCENT: 27 % (ref 20–42)
MAGNESIUM SERPL-MCNC: 1.8 MG/DL (ref 1.6–2.6)
MCH RBC QN AUTO: 30.4 PG (ref 26–35)
MCHC RBC AUTO-ENTMCNC: 32.8 G/DL (ref 32–34.5)
MCV RBC AUTO: 92.4 FL (ref 80–99.9)
MONOCYTES NFR BLD: 0.91 K/UL (ref 0.1–0.95)
MONOCYTES NFR BLD: 9 % (ref 2–12)
NEUTROPHILS NFR BLD: 63 % (ref 43–80)
NEUTS SEG NFR BLD: 6.75 K/UL (ref 1.8–7.3)
PLATELET # BLD AUTO: 229 K/UL (ref 130–450)
PMV BLD AUTO: 9.5 FL (ref 7–12)
POTASSIUM SERPL-SCNC: 3.9 MMOL/L (ref 3.5–5)
RBC # BLD AUTO: 3.69 M/UL (ref 3.5–5.5)
SODIUM SERPL-SCNC: 140 MMOL/L (ref 132–146)
WBC OTHER # BLD: 10.7 K/UL (ref 4.5–11.5)

## 2023-08-10 PROCEDURE — G0378 HOSPITAL OBSERVATION PER HR: HCPCS

## 2023-08-10 PROCEDURE — 36415 COLL VENOUS BLD VENIPUNCTURE: CPT

## 2023-08-10 PROCEDURE — 96361 HYDRATE IV INFUSION ADD-ON: CPT

## 2023-08-10 PROCEDURE — 85025 COMPLETE CBC W/AUTO DIFF WBC: CPT

## 2023-08-10 PROCEDURE — 2580000003 HC RX 258: Performed by: FAMILY MEDICINE

## 2023-08-10 PROCEDURE — 80048 BASIC METABOLIC PNL TOTAL CA: CPT

## 2023-08-10 PROCEDURE — 6370000000 HC RX 637 (ALT 250 FOR IP): Performed by: FAMILY MEDICINE

## 2023-08-10 PROCEDURE — 83735 ASSAY OF MAGNESIUM: CPT

## 2023-08-10 RX ORDER — ERGOCALCIFEROL 1.25 MG/1
50000 CAPSULE ORAL WEEKLY
Qty: 7 CAPSULE | Refills: 0 | Status: SHIPPED | OUTPATIENT
Start: 2023-08-16 | End: 2023-09-28

## 2023-08-10 RX ORDER — ERYTHROMYCIN 5 MG/G
OINTMENT OPHTHALMIC
Qty: 1 G | Refills: 0 | Status: SHIPPED | OUTPATIENT
Start: 2023-08-10 | End: 2023-08-20

## 2023-08-10 RX ORDER — POLYVINYL ALCOHOL 14 MG/ML
1 SOLUTION/ DROPS OPHTHALMIC 4 TIMES DAILY
Qty: 15 ML | Refills: 0 | Status: SHIPPED | OUTPATIENT
Start: 2023-08-10 | End: 2023-09-09

## 2023-08-10 RX ADMIN — LEVETIRACETAM 500 MG: 500 TABLET, FILM COATED ORAL at 07:55

## 2023-08-10 RX ADMIN — PANTOPRAZOLE SODIUM 40 MG: 40 TABLET, DELAYED RELEASE ORAL at 07:55

## 2023-08-10 RX ADMIN — POLYVINYL ALCOHOL 1 DROP: 14 SOLUTION/ DROPS OPHTHALMIC at 07:55

## 2023-08-10 RX ADMIN — AMLODIPINE BESYLATE 10 MG: 10 TABLET ORAL at 07:55

## 2023-08-10 RX ADMIN — SODIUM CHLORIDE: 9 INJECTION, SOLUTION INTRAVENOUS at 09:31

## 2023-08-10 RX ADMIN — HYDROCHLOROTHIAZIDE 12.5 MG: 12.5 TABLET ORAL at 07:55

## 2023-08-10 RX ADMIN — LOSARTAN POTASSIUM 25 MG: 50 TABLET, FILM COATED ORAL at 07:55

## 2023-08-10 ASSESSMENT — PAIN SCALES - GENERAL: PAINLEVEL_OUTOF10: 0

## 2023-08-10 NOTE — DISCHARGE SUMMARY
acute abnormality. SOFT TISSUES/SKULL:  No acute abnormality of the visualized skull or soft tissues. Persistent but decreased hemorrhage in the 3rd ventricle compared to 7/13/23. the hemorrhage in the lateral ventricles on 07/13/2023 is no longer present. CT HEAD WO CONTRAST    Result Date: 7/13/2023  EXAMINATION: CT OF THE HEAD WITHOUT CONTRAST  7/13/2023 1:30 pm TECHNIQUE: CT of the head was performed without the administration of intravenous contrast. Automated exposure control, iterative reconstruction, and/or weight based adjustment of the mA/kV was utilized to reduce the radiation dose to as low as reasonably achievable. COMPARISON: 07/12/2023 HISTORY: ORDERING SYSTEM PROVIDED HISTORY: HA TECHNOLOGIST PROVIDED HISTORY: Reason for exam:->HA Has a \"code stroke\" or \"stroke alert\" been called? ->No What reading provider will be dictating this exam?->CRC FINDINGS: BRAIN/VENTRICLES: Continued intraventricular hemorrhage in the 3rd and lateral ventricles. 3rd ventricle dilated up to approximately 2 cm transverse. Minimally dilated lateral ventricles. ORBITS: The visualized portion of the orbits demonstrate no acute abnormality. SINUSES: The visualized paranasal sinuses and mastoid air cells demonstrate no acute abnormality. SOFT TISSUES/SKULL:  No acute abnormality of the visualized skull or soft tissues. 1. Continued intraventricular hemorrhage and dilation of the 3rd and lateral ventricles. CT HEAD WO CONTRAST    Result Date: 7/12/2023  EXAMINATION: CT OF THE HEAD WITHOUT CONTRAST  7/12/2023 4:39 am TECHNIQUE: CT of the head was performed without the administration of intravenous contrast. Automated exposure control, iterative reconstruction, and/or weight based adjustment of the mA/kV was utilized to reduce the radiation dose to as low as reasonably achievable. COMPARISON: 07/11/2023.  HISTORY: ORDERING SYSTEM PROVIDED HISTORY: repeat ct TECHNOLOGIST PROVIDED HISTORY: Reason for exam:->repeat ct amLODIPine (NORVASC) 10 MG tablet Take 1 tablet by mouth daily  Qty: 30 tablet, Refills: 3      dexamethasone (DECADRON) 2 MG tablet 1 p.o. twice daily for 2 weeks, then 1 p.o. daily for 2 week, then 1 p.o. every other day for 2 weeks. Follow up with neurosugery prior to stopping. Qty: 56 tablet, Refills: 0      hydroCHLOROthiazide (HYDRODIURIL) 12.5 MG tablet Take 1 tablet by mouth daily  Qty: 30 tablet, Refills: 3      pantoprazole (PROTONIX) 40 MG tablet Take 1 tablet by mouth every morning (before breakfast)  Qty: 30 tablet, Refills: 3      aspirin 81 MG EC tablet Take 1 tablet by mouth daily             Time Spent on discharge is 45 minutes in the examination, evaluation, counseling and review of medications and discharge plan.       Signed:    Masoud Calle MD   8/10/2023

## 2023-08-10 NOTE — PROGRESS NOTES
Message Nohemy Valentine NP, with neurology regarding if pt okay for discharge.     Okay for discharge per Nohemy Valentine NP.

## 2023-08-11 DIAGNOSIS — I62.9 INTRACRANIAL HEMORRHAGE (HCC): Primary | ICD-10-CM

## 2023-09-11 ENCOUNTER — OFFICE VISIT (OUTPATIENT)
Dept: NEUROSURGERY | Age: 48
End: 2023-09-11
Payer: COMMERCIAL

## 2023-09-11 ENCOUNTER — HOSPITAL ENCOUNTER (OUTPATIENT)
Dept: CT IMAGING | Age: 48
Discharge: HOME | End: 2023-09-13
Attending: NEUROLOGICAL SURGERY
Payer: COMMERCIAL

## 2023-09-11 DIAGNOSIS — I62.9 INTRACRANIAL HEMORRHAGE (HCC): ICD-10-CM

## 2023-09-11 DIAGNOSIS — I61.5 INTRAVENTRICULAR HEMORRHAGE (HCC): Primary | ICD-10-CM

## 2023-09-11 PROCEDURE — 99213 OFFICE O/P EST LOW 20 MIN: CPT | Performed by: STUDENT IN AN ORGANIZED HEALTH CARE EDUCATION/TRAINING PROGRAM

## 2023-09-11 PROCEDURE — 99212 OFFICE O/P EST SF 10 MIN: CPT

## 2023-09-11 PROCEDURE — 70450 CT HEAD/BRAIN W/O DYE: CPT

## 2023-09-11 PROCEDURE — G8417 CALC BMI ABV UP PARAM F/U: HCPCS | Performed by: STUDENT IN AN ORGANIZED HEALTH CARE EDUCATION/TRAINING PROGRAM

## 2023-09-11 PROCEDURE — G8427 DOCREV CUR MEDS BY ELIG CLIN: HCPCS | Performed by: STUDENT IN AN ORGANIZED HEALTH CARE EDUCATION/TRAINING PROGRAM

## 2023-09-11 PROCEDURE — 4004F PT TOBACCO SCREEN RCVD TLK: CPT | Performed by: STUDENT IN AN ORGANIZED HEALTH CARE EDUCATION/TRAINING PROGRAM

## 2023-09-11 NOTE — PROGRESS NOTES
Hospital Follow-up     This is a 51year old female who presents to the office for a 2 month follow-up s/p ICH     Subjective: Patient states she is doing well. She states she still does get headaches, but continue to improve. She denies any numbness or weakness. Has remained off AP/AC medications. CT Head reviewed with patient. 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 Assessment: This is a 51 y.o.  female presenting for a 2 month follow-up s/p ICH     Plan:  -Pain control and expectations discussed  -Continue to refrain from AP/AC medications  -OARRS report reviewed   -Follow-up in neurosurgery clinic in 1 month with repeat MRI Brain  -Call or return to neurosurgery office sooner if symptoms worsen or if new issues arise in the interim.     Electronically signed by Lady Anitra PA-C on 2023 at 3:39 PM

## 2023-09-15 PROBLEM — M41.80 LEVOSCOLIOSIS: Status: ACTIVE | Noted: 2018-09-10

## 2023-09-15 PROBLEM — J45.20 MILD INTERMITTENT ASTHMA WITHOUT COMPLICATION: Status: ACTIVE | Noted: 2018-09-10

## 2023-09-15 PROBLEM — G47.33 OSA (OBSTRUCTIVE SLEEP APNEA): Status: ACTIVE | Noted: 2019-01-23

## 2023-09-15 PROBLEM — F12.10 CANNABIS ABUSE: Status: ACTIVE | Noted: 2018-09-10

## 2024-04-04 ENCOUNTER — HOSPITAL ENCOUNTER (EMERGENCY)
Age: 49
Discharge: ANOTHER HOSPITAL | End: 2024-04-05
Attending: STUDENT IN AN ORGANIZED HEALTH CARE EDUCATION/TRAINING PROGRAM
Payer: COMMERCIAL

## 2024-04-04 ENCOUNTER — APPOINTMENT (OUTPATIENT)
Dept: CT IMAGING | Age: 49
End: 2024-04-04
Payer: COMMERCIAL

## 2024-04-04 VITALS
OXYGEN SATURATION: 100 % | HEART RATE: 84 BPM | RESPIRATION RATE: 18 BRPM | BODY MASS INDEX: 36.61 KG/M2 | TEMPERATURE: 99 F | DIASTOLIC BLOOD PRESSURE: 79 MMHG | SYSTOLIC BLOOD PRESSURE: 134 MMHG | WEIGHT: 220 LBS

## 2024-04-04 DIAGNOSIS — G93.9 BRAIN LESION: Primary | ICD-10-CM

## 2024-04-04 DIAGNOSIS — R51.9 NONINTRACTABLE EPISODIC HEADACHE, UNSPECIFIED HEADACHE TYPE: ICD-10-CM

## 2024-04-04 LAB
ANION GAP SERPL CALCULATED.3IONS-SCNC: 9 MMOL/L (ref 7–16)
BASOPHILS # BLD: 0.02 K/UL (ref 0–0.2)
BASOPHILS NFR BLD: 0 % (ref 0–2)
BUN SERPL-MCNC: 14 MG/DL (ref 6–20)
CALCIUM SERPL-MCNC: 8.6 MG/DL (ref 8.6–10.2)
CHLORIDE SERPL-SCNC: 104 MMOL/L (ref 98–107)
CO2 SERPL-SCNC: 23 MMOL/L (ref 22–29)
CREAT SERPL-MCNC: 0.9 MG/DL (ref 0.5–1)
EOSINOPHIL # BLD: 0.42 K/UL (ref 0.05–0.5)
EOSINOPHILS RELATIVE PERCENT: 6 % (ref 0–6)
ERYTHROCYTE [DISTWIDTH] IN BLOOD BY AUTOMATED COUNT: 13.5 % (ref 11.5–15)
GFR SERPL CREATININE-BSD FRML MDRD: 76 ML/MIN/1.73M2
GLUCOSE SERPL-MCNC: 90 MG/DL (ref 74–99)
HCT VFR BLD AUTO: 33.9 % (ref 34–48)
HGB BLD-MCNC: 11.5 G/DL (ref 11.5–15.5)
IMM GRANULOCYTES # BLD AUTO: <0.03 K/UL (ref 0–0.58)
IMM GRANULOCYTES NFR BLD: 0 % (ref 0–5)
INR PPP: 1
LYMPHOCYTES NFR BLD: 2.4 K/UL (ref 1.5–4)
LYMPHOCYTES RELATIVE PERCENT: 34 % (ref 20–42)
MAGNESIUM SERPL-MCNC: 1.9 MG/DL (ref 1.6–2.6)
MCH RBC QN AUTO: 29.6 PG (ref 26–35)
MCHC RBC AUTO-ENTMCNC: 33.9 G/DL (ref 32–34.5)
MCV RBC AUTO: 87.4 FL (ref 80–99.9)
MONOCYTES NFR BLD: 0.64 K/UL (ref 0.1–0.95)
MONOCYTES NFR BLD: 9 % (ref 2–12)
NEUTROPHILS NFR BLD: 51 % (ref 43–80)
NEUTS SEG NFR BLD: 3.64 K/UL (ref 1.8–7.3)
PARTIAL THROMBOPLASTIN TIME: 34.4 SEC (ref 24.5–35.1)
PLATELET # BLD AUTO: 250 K/UL (ref 130–450)
PMV BLD AUTO: 9.6 FL (ref 7–12)
POTASSIUM SERPL-SCNC: 4.1 MMOL/L (ref 3.5–5)
PROTHROMBIN TIME: 10.6 SEC (ref 9.3–12.4)
RBC # BLD AUTO: 3.88 M/UL (ref 3.5–5.5)
SODIUM SERPL-SCNC: 136 MMOL/L (ref 132–146)
WBC OTHER # BLD: 7.1 K/UL (ref 4.5–11.5)

## 2024-04-04 PROCEDURE — 96374 THER/PROPH/DIAG INJ IV PUSH: CPT

## 2024-04-04 PROCEDURE — 96375 TX/PRO/DX INJ NEW DRUG ADDON: CPT

## 2024-04-04 PROCEDURE — 70450 CT HEAD/BRAIN W/O DYE: CPT

## 2024-04-04 PROCEDURE — 96361 HYDRATE IV INFUSION ADD-ON: CPT

## 2024-04-04 PROCEDURE — 6370000000 HC RX 637 (ALT 250 FOR IP): Performed by: STUDENT IN AN ORGANIZED HEALTH CARE EDUCATION/TRAINING PROGRAM

## 2024-04-04 PROCEDURE — 85730 THROMBOPLASTIN TIME PARTIAL: CPT

## 2024-04-04 PROCEDURE — 85610 PROTHROMBIN TIME: CPT

## 2024-04-04 PROCEDURE — 6360000002 HC RX W HCPCS: Performed by: STUDENT IN AN ORGANIZED HEALTH CARE EDUCATION/TRAINING PROGRAM

## 2024-04-04 PROCEDURE — 80048 BASIC METABOLIC PNL TOTAL CA: CPT

## 2024-04-04 PROCEDURE — 85025 COMPLETE CBC W/AUTO DIFF WBC: CPT

## 2024-04-04 PROCEDURE — 83735 ASSAY OF MAGNESIUM: CPT

## 2024-04-04 PROCEDURE — 99285 EMERGENCY DEPT VISIT HI MDM: CPT

## 2024-04-04 PROCEDURE — 2580000003 HC RX 258: Performed by: STUDENT IN AN ORGANIZED HEALTH CARE EDUCATION/TRAINING PROGRAM

## 2024-04-04 RX ORDER — DIPHENHYDRAMINE HYDROCHLORIDE 50 MG/ML
25 INJECTION INTRAMUSCULAR; INTRAVENOUS ONCE
Status: COMPLETED | OUTPATIENT
Start: 2024-04-04 | End: 2024-04-04

## 2024-04-04 RX ORDER — ACETAMINOPHEN 500 MG
1000 TABLET ORAL ONCE
Status: COMPLETED | OUTPATIENT
Start: 2024-04-04 | End: 2024-04-04

## 2024-04-04 RX ORDER — 0.9 % SODIUM CHLORIDE 0.9 %
1000 INTRAVENOUS SOLUTION INTRAVENOUS ONCE
Status: COMPLETED | OUTPATIENT
Start: 2024-04-04 | End: 2024-04-04

## 2024-04-04 RX ORDER — DEXAMETHASONE SODIUM PHOSPHATE 10 MG/ML
10 INJECTION INTRAMUSCULAR; INTRAVENOUS ONCE
Status: COMPLETED | OUTPATIENT
Start: 2024-04-04 | End: 2024-04-04

## 2024-04-04 RX ORDER — PROCHLORPERAZINE EDISYLATE 5 MG/ML
10 INJECTION INTRAMUSCULAR; INTRAVENOUS ONCE
Status: COMPLETED | OUTPATIENT
Start: 2024-04-04 | End: 2024-04-04

## 2024-04-04 RX ADMIN — SODIUM CHLORIDE 1000 ML: 9 INJECTION, SOLUTION INTRAVENOUS at 21:25

## 2024-04-04 RX ADMIN — DEXAMETHASONE SODIUM PHOSPHATE 10 MG: 10 INJECTION INTRAMUSCULAR; INTRAVENOUS at 22:07

## 2024-04-04 RX ADMIN — PROCHLORPERAZINE EDISYLATE 10 MG: 5 INJECTION INTRAMUSCULAR; INTRAVENOUS at 21:21

## 2024-04-04 RX ADMIN — ACETAMINOPHEN 1000 MG: 500 TABLET ORAL at 21:20

## 2024-04-04 RX ADMIN — DIPHENHYDRAMINE HYDROCHLORIDE 25 MG: 50 INJECTION INTRAMUSCULAR; INTRAVENOUS at 21:21

## 2024-04-04 ASSESSMENT — ENCOUNTER SYMPTOMS
PHOTOPHOBIA: 0
ABDOMINAL PAIN: 0
SHORTNESS OF BREATH: 0
VOMITING: 0
DIARRHEA: 0
NAUSEA: 0
COUGH: 0

## 2024-04-04 ASSESSMENT — PAIN SCALES - GENERAL: PAINLEVEL_OUTOF10: 7

## 2024-04-05 ENCOUNTER — APPOINTMENT (OUTPATIENT)
Dept: RADIOLOGY | Facility: HOSPITAL | Age: 49
End: 2024-04-05
Payer: COMMERCIAL

## 2024-04-05 ENCOUNTER — HOSPITAL ENCOUNTER (INPATIENT)
Facility: HOSPITAL | Age: 49
LOS: 12 days | Discharge: HOME | End: 2024-04-17
Attending: NEUROLOGICAL SURGERY | Admitting: NEUROLOGICAL SURGERY
Payer: COMMERCIAL

## 2024-04-05 DIAGNOSIS — G93.89 BRAIN MASS: Primary | ICD-10-CM

## 2024-04-05 DIAGNOSIS — G91.1 OBSTRUCTIVE HYDROCEPHALUS (MULTI): ICD-10-CM

## 2024-04-05 DIAGNOSIS — Z74.09 IMPAIRED MOBILITY AND ENDURANCE: ICD-10-CM

## 2024-04-05 DIAGNOSIS — G89.18 ACUTE POST-OPERATIVE PAIN: ICD-10-CM

## 2024-04-05 DIAGNOSIS — Z98.890 S/P CRANIOTOMY: ICD-10-CM

## 2024-04-05 LAB
ABO GROUP (TYPE) IN BLOOD: NORMAL
ALBUMIN SERPL BCP-MCNC: 3.7 G/DL (ref 3.4–5)
ANION GAP SERPL CALC-SCNC: 10 MMOL/L (ref 10–20)
ANTIBODY SCREEN: NORMAL
APPEARANCE UR: CLEAR
APTT PPP: 37 SECONDS (ref 27–38)
BASOPHILS # BLD AUTO: 0.01 X10*3/UL (ref 0–0.1)
BASOPHILS NFR BLD AUTO: 0.1 %
BILIRUB UR STRIP.AUTO-MCNC: NEGATIVE MG/DL
BUN SERPL-MCNC: 11 MG/DL (ref 6–23)
CALCIUM SERPL-MCNC: 8.7 MG/DL (ref 8.6–10.6)
CHLORIDE SERPL-SCNC: 103 MMOL/L (ref 98–107)
CO2 SERPL-SCNC: 23 MMOL/L (ref 21–32)
COLOR UR: COLORLESS
CREAT SERPL-MCNC: 0.58 MG/DL (ref 0.5–1.05)
EGFRCR SERPLBLD CKD-EPI 2021: >90 ML/MIN/1.73M*2
EOSINOPHIL # BLD AUTO: 0 X10*3/UL (ref 0–0.7)
EOSINOPHIL NFR BLD AUTO: 0 %
ERYTHROCYTE [DISTWIDTH] IN BLOOD BY AUTOMATED COUNT: 13.4 % (ref 11.5–14.5)
GLUCOSE SERPL-MCNC: 117 MG/DL (ref 74–99)
GLUCOSE UR STRIP.AUTO-MCNC: NORMAL MG/DL
HCG UR QL IA.RAPID: NEGATIVE
HCT VFR BLD AUTO: 40.7 % (ref 36–46)
HGB BLD-MCNC: 12.9 G/DL (ref 12–16)
IMM GRANULOCYTES # BLD AUTO: 0.03 X10*3/UL (ref 0–0.7)
IMM GRANULOCYTES NFR BLD AUTO: 0.4 % (ref 0–0.9)
INR PPP: 1 (ref 0.9–1.1)
KETONES UR STRIP.AUTO-MCNC: NEGATIVE MG/DL
LEUKOCYTE ESTERASE UR QL STRIP.AUTO: NEGATIVE
LYMPHOCYTES # BLD AUTO: 0.92 X10*3/UL (ref 1.2–4.8)
LYMPHOCYTES NFR BLD AUTO: 11.7 %
MCH RBC QN AUTO: 28.1 PG (ref 26–34)
MCHC RBC AUTO-ENTMCNC: 31.7 G/DL (ref 32–36)
MCV RBC AUTO: 89 FL (ref 80–100)
MONOCYTES # BLD AUTO: 0.09 X10*3/UL (ref 0.1–1)
MONOCYTES NFR BLD AUTO: 1.1 %
NEUTROPHILS # BLD AUTO: 6.78 X10*3/UL (ref 1.2–7.7)
NEUTROPHILS NFR BLD AUTO: 86.7 %
NITRITE UR QL STRIP.AUTO: NEGATIVE
NRBC BLD-RTO: 0 /100 WBCS (ref 0–0)
PH UR STRIP.AUTO: 5.5 [PH]
PHOSPHATE SERPL-MCNC: 2.4 MG/DL (ref 2.5–4.9)
PLATELET # BLD AUTO: 303 X10*3/UL (ref 150–450)
POTASSIUM SERPL-SCNC: 3.9 MMOL/L (ref 3.5–5.3)
PROT UR STRIP.AUTO-MCNC: NEGATIVE MG/DL
PROTHROMBIN TIME: 11.2 SECONDS (ref 9.8–12.8)
RBC # BLD AUTO: 4.59 X10*6/UL (ref 4–5.2)
RBC # UR STRIP.AUTO: NEGATIVE /UL
RH FACTOR (ANTIGEN D): NORMAL
SODIUM SERPL-SCNC: 132 MMOL/L (ref 136–145)
SP GR UR STRIP.AUTO: 1.01
UROBILINOGEN UR STRIP.AUTO-MCNC: NORMAL MG/DL
WBC # BLD AUTO: 7.8 X10*3/UL (ref 4.4–11.3)

## 2024-04-05 PROCEDURE — 2500000005 HC RX 250 GENERAL PHARMACY W/O HCPCS

## 2024-04-05 PROCEDURE — 2500000001 HC RX 250 WO HCPCS SELF ADMINISTERED DRUGS (ALT 637 FOR MEDICARE OP)

## 2024-04-05 PROCEDURE — 2500000001 HC RX 250 WO HCPCS SELF ADMINISTERED DRUGS (ALT 637 FOR MEDICARE OP): Performed by: STUDENT IN AN ORGANIZED HEALTH CARE EDUCATION/TRAINING PROGRAM

## 2024-04-05 PROCEDURE — 36415 COLL VENOUS BLD VENIPUNCTURE: CPT | Performed by: STUDENT IN AN ORGANIZED HEALTH CARE EDUCATION/TRAINING PROGRAM

## 2024-04-05 PROCEDURE — 70553 MRI BRAIN STEM W/O & W/DYE: CPT

## 2024-04-05 PROCEDURE — 74177 CT ABD & PELVIS W/CONTRAST: CPT

## 2024-04-05 PROCEDURE — 2550000001 HC RX 255 CONTRASTS: Performed by: NEUROLOGICAL SURGERY

## 2024-04-05 PROCEDURE — 70544 MR ANGIOGRAPHY HEAD W/O DYE: CPT | Performed by: RADIOLOGY

## 2024-04-05 PROCEDURE — 2500000004 HC RX 250 GENERAL PHARMACY W/ HCPCS (ALT 636 FOR OP/ED)

## 2024-04-05 PROCEDURE — 71045 X-RAY EXAM CHEST 1 VIEW: CPT | Performed by: RADIOLOGY

## 2024-04-05 PROCEDURE — 84100 ASSAY OF PHOSPHORUS: CPT | Performed by: STUDENT IN AN ORGANIZED HEALTH CARE EDUCATION/TRAINING PROGRAM

## 2024-04-05 PROCEDURE — 2500000004 HC RX 250 GENERAL PHARMACY W/ HCPCS (ALT 636 FOR OP/ED): Performed by: STUDENT IN AN ORGANIZED HEALTH CARE EDUCATION/TRAINING PROGRAM

## 2024-04-05 PROCEDURE — 99285 EMERGENCY DEPT VISIT HI MDM: CPT | Performed by: EMERGENCY MEDICINE

## 2024-04-05 PROCEDURE — 70544 MR ANGIOGRAPHY HEAD W/O DYE: CPT

## 2024-04-05 PROCEDURE — 81003 URINALYSIS AUTO W/O SCOPE: CPT | Performed by: STUDENT IN AN ORGANIZED HEALTH CARE EDUCATION/TRAINING PROGRAM

## 2024-04-05 PROCEDURE — 86901 BLOOD TYPING SEROLOGIC RH(D): CPT | Performed by: STUDENT IN AN ORGANIZED HEALTH CARE EDUCATION/TRAINING PROGRAM

## 2024-04-05 PROCEDURE — 96374 THER/PROPH/DIAG INJ IV PUSH: CPT

## 2024-04-05 PROCEDURE — 99223 1ST HOSP IP/OBS HIGH 75: CPT | Performed by: NEUROLOGICAL SURGERY

## 2024-04-05 PROCEDURE — 70553 MRI BRAIN STEM W/O & W/DYE: CPT | Performed by: RADIOLOGY

## 2024-04-05 PROCEDURE — 2060000001 HC INTERMEDIATE ICU ROOM DAILY

## 2024-04-05 PROCEDURE — 71260 CT THORAX DX C+: CPT | Performed by: RADIOLOGY

## 2024-04-05 PROCEDURE — 74177 CT ABD & PELVIS W/CONTRAST: CPT | Performed by: RADIOLOGY

## 2024-04-05 PROCEDURE — 71045 X-RAY EXAM CHEST 1 VIEW: CPT

## 2024-04-05 PROCEDURE — 85025 COMPLETE CBC W/AUTO DIFF WBC: CPT | Performed by: STUDENT IN AN ORGANIZED HEALTH CARE EDUCATION/TRAINING PROGRAM

## 2024-04-05 PROCEDURE — 85610 PROTHROMBIN TIME: CPT | Performed by: STUDENT IN AN ORGANIZED HEALTH CARE EDUCATION/TRAINING PROGRAM

## 2024-04-05 PROCEDURE — 99285 EMERGENCY DEPT VISIT HI MDM: CPT | Mod: 25

## 2024-04-05 PROCEDURE — A9575 INJ GADOTERATE MEGLUMI 0.1ML: HCPCS | Performed by: NEUROLOGICAL SURGERY

## 2024-04-05 PROCEDURE — 81025 URINE PREGNANCY TEST: CPT | Performed by: STUDENT IN AN ORGANIZED HEALTH CARE EDUCATION/TRAINING PROGRAM

## 2024-04-05 RX ORDER — HEPARIN SODIUM 5000 [USP'U]/ML
5000 INJECTION, SOLUTION INTRAVENOUS; SUBCUTANEOUS EVERY 8 HOURS
Status: DISCONTINUED | OUTPATIENT
Start: 2024-04-05 | End: 2024-04-08

## 2024-04-05 RX ORDER — AMOXICILLIN 250 MG
2 CAPSULE ORAL 2 TIMES DAILY
Status: DISCONTINUED | OUTPATIENT
Start: 2024-04-05 | End: 2024-04-09

## 2024-04-05 RX ORDER — OXYCODONE HYDROCHLORIDE 5 MG/1
5 TABLET ORAL EVERY 4 HOURS PRN
Status: DISCONTINUED | OUTPATIENT
Start: 2024-04-05 | End: 2024-04-09

## 2024-04-05 RX ORDER — LOSARTAN POTASSIUM 25 MG/1
25 TABLET ORAL ONCE
Status: DISCONTINUED | OUTPATIENT
Start: 2024-04-05 | End: 2024-04-05

## 2024-04-05 RX ORDER — HYDROCHLOROTHIAZIDE 12.5 MG/1
12.5 TABLET ORAL DAILY
COMMUNITY

## 2024-04-05 RX ORDER — HYDROCHLOROTHIAZIDE 25 MG/1
12.5 TABLET ORAL DAILY
Status: DISCONTINUED | OUTPATIENT
Start: 2024-04-06 | End: 2024-04-07

## 2024-04-05 RX ORDER — PROMETHAZINE HYDROCHLORIDE 25 MG/1
25 TABLET ORAL EVERY 6 HOURS PRN
Status: DISCONTINUED | OUTPATIENT
Start: 2024-04-05 | End: 2024-04-09

## 2024-04-05 RX ORDER — ALBUTEROL SULFATE 90 UG/1
2 AEROSOL, METERED RESPIRATORY (INHALATION) EVERY 6 HOURS PRN
Status: DISCONTINUED | OUTPATIENT
Start: 2024-04-05 | End: 2024-04-17 | Stop reason: HOSPADM

## 2024-04-05 RX ORDER — AMLODIPINE BESYLATE 10 MG/1
10 TABLET ORAL DAILY
Status: DISCONTINUED | OUTPATIENT
Start: 2024-04-06 | End: 2024-04-17 | Stop reason: HOSPADM

## 2024-04-05 RX ORDER — NALOXONE HYDROCHLORIDE 0.4 MG/ML
0.2 INJECTION, SOLUTION INTRAMUSCULAR; INTRAVENOUS; SUBCUTANEOUS EVERY 5 MIN PRN
Status: DISCONTINUED | OUTPATIENT
Start: 2024-04-05 | End: 2024-04-09

## 2024-04-05 RX ORDER — BUTALBITAL, ACETAMINOPHEN AND CAFFEINE 50; 325; 40 MG/1; MG/1; MG/1
1 TABLET ORAL EVERY 4 HOURS PRN
COMMUNITY

## 2024-04-05 RX ORDER — ACETAMINOPHEN 325 MG/1
650 TABLET ORAL EVERY 4 HOURS PRN
Status: DISCONTINUED | OUTPATIENT
Start: 2024-04-05 | End: 2024-04-09

## 2024-04-05 RX ORDER — OXYCODONE HYDROCHLORIDE 5 MG/1
10 TABLET ORAL EVERY 4 HOURS PRN
Status: DISCONTINUED | OUTPATIENT
Start: 2024-04-05 | End: 2024-04-09

## 2024-04-05 RX ORDER — POLYVINYL ALCOHOL 14 MG/ML
1 SOLUTION/ DROPS OPHTHALMIC 4 TIMES DAILY
COMMUNITY

## 2024-04-05 RX ORDER — SODIUM CHLORIDE 9 MG/ML
75 INJECTION, SOLUTION INTRAVENOUS CONTINUOUS
Status: DISCONTINUED | OUTPATIENT
Start: 2024-04-05 | End: 2024-04-05

## 2024-04-05 RX ORDER — METOCLOPRAMIDE HYDROCHLORIDE 5 MG/ML
10 INJECTION INTRAMUSCULAR; INTRAVENOUS ONCE
Status: COMPLETED | OUTPATIENT
Start: 2024-04-05 | End: 2024-04-05

## 2024-04-05 RX ORDER — LOSARTAN POTASSIUM 25 MG/1
25 TABLET ORAL DAILY
Status: DISCONTINUED | OUTPATIENT
Start: 2024-04-05 | End: 2024-04-17 | Stop reason: HOSPADM

## 2024-04-05 RX ORDER — POLYETHYLENE GLYCOL 3350 17 G/17G
17 POWDER, FOR SOLUTION ORAL DAILY
Status: DISCONTINUED | OUTPATIENT
Start: 2024-04-05 | End: 2024-04-09

## 2024-04-05 RX ORDER — ERYTHROMYCIN 5 MG/G
1 OINTMENT OPHTHALMIC NIGHTLY
COMMUNITY

## 2024-04-05 RX ORDER — ERYTHROMYCIN 5 MG/G
1 OINTMENT OPHTHALMIC NIGHTLY
Status: DISCONTINUED | OUTPATIENT
Start: 2024-04-05 | End: 2024-04-17 | Stop reason: HOSPADM

## 2024-04-05 RX ORDER — POLYVINYL ALCOHOL 14 MG/ML
1 SOLUTION/ DROPS OPHTHALMIC 4 TIMES DAILY
Status: DISCONTINUED | OUTPATIENT
Start: 2024-04-05 | End: 2024-04-05

## 2024-04-05 RX ORDER — AMLODIPINE BESYLATE 10 MG/1
10 TABLET ORAL DAILY
COMMUNITY

## 2024-04-05 RX ORDER — AMLODIPINE BESYLATE 5 MG/1
10 TABLET ORAL ONCE
Status: COMPLETED | OUTPATIENT
Start: 2024-04-05 | End: 2024-04-05

## 2024-04-05 RX ORDER — ONDANSETRON 4 MG/1
4 TABLET, FILM COATED ORAL EVERY 8 HOURS PRN
Status: DISCONTINUED | OUTPATIENT
Start: 2024-04-05 | End: 2024-04-09

## 2024-04-05 RX ORDER — CARBOXYMETHYLCELLULOSE SODIUM 5 MG/ML
1 SOLUTION/ DROPS OPHTHALMIC 4 TIMES DAILY
Status: DISCONTINUED | OUTPATIENT
Start: 2024-04-05 | End: 2024-04-05

## 2024-04-05 RX ORDER — HYDROCHLOROTHIAZIDE 25 MG/1
12.5 TABLET ORAL ONCE
Status: COMPLETED | OUTPATIENT
Start: 2024-04-05 | End: 2024-04-05

## 2024-04-05 RX ORDER — ONDANSETRON HYDROCHLORIDE 2 MG/ML
4 INJECTION, SOLUTION INTRAVENOUS EVERY 8 HOURS PRN
Status: DISCONTINUED | OUTPATIENT
Start: 2024-04-05 | End: 2024-04-09

## 2024-04-05 RX ORDER — ACETAMINOPHEN 500 MG
5 TABLET ORAL NIGHTLY PRN
Status: DISCONTINUED | OUTPATIENT
Start: 2024-04-05 | End: 2024-04-09

## 2024-04-05 RX ORDER — GADOTERATE MEGLUMINE 376.9 MG/ML
16 INJECTION INTRAVENOUS
Status: COMPLETED | OUTPATIENT
Start: 2024-04-05 | End: 2024-04-05

## 2024-04-05 RX ORDER — LOSARTAN POTASSIUM 25 MG/1
25 TABLET ORAL DAILY
COMMUNITY

## 2024-04-05 RX ORDER — ALBUTEROL SULFATE 90 UG/1
2 AEROSOL, METERED RESPIRATORY (INHALATION) EVERY 6 HOURS PRN
COMMUNITY

## 2024-04-05 RX ORDER — PROMETHAZINE HYDROCHLORIDE 25 MG/1
25 SUPPOSITORY RECTAL EVERY 12 HOURS PRN
Status: DISCONTINUED | OUTPATIENT
Start: 2024-04-05 | End: 2024-04-09

## 2024-04-05 RX ADMIN — HYDROCHLOROTHIAZIDE 12.5 MG: 25 TABLET ORAL at 02:10

## 2024-04-05 RX ADMIN — OXYCODONE HYDROCHLORIDE 5 MG: 5 TABLET ORAL at 10:08

## 2024-04-05 RX ADMIN — ERYTHROMYCIN 1 CM: 5 OINTMENT OPHTHALMIC at 21:38

## 2024-04-05 RX ADMIN — HEPARIN SODIUM 5000 UNITS: 5000 INJECTION INTRAVENOUS; SUBCUTANEOUS at 15:00

## 2024-04-05 RX ADMIN — METOCLOPRAMIDE 10 MG: 5 INJECTION, SOLUTION INTRAMUSCULAR; INTRAVENOUS at 01:54

## 2024-04-05 RX ADMIN — LOSARTAN POTASSIUM 25 MG: 25 TABLET, FILM COATED ORAL at 10:18

## 2024-04-05 RX ADMIN — OXYCODONE HYDROCHLORIDE 5 MG: 5 TABLET ORAL at 21:37

## 2024-04-05 RX ADMIN — AMLODIPINE BESYLATE 10 MG: 5 TABLET ORAL at 02:33

## 2024-04-05 RX ADMIN — CARBOXYMETHYLCELLULOSE SODIUM 1 DROP: 5 SOLUTION/ DROPS OPHTHALMIC at 21:38

## 2024-04-05 RX ADMIN — HEPARIN SODIUM 5000 UNITS: 5000 INJECTION INTRAVENOUS; SUBCUTANEOUS at 22:58

## 2024-04-05 RX ADMIN — SODIUM CHLORIDE 75 ML/HR: 9 INJECTION, SOLUTION INTRAVENOUS at 06:30

## 2024-04-05 RX ADMIN — IOHEXOL 75 ML: 350 INJECTION, SOLUTION INTRAVENOUS at 15:30

## 2024-04-05 RX ADMIN — SENNOSIDES AND DOCUSATE SODIUM 2 TABLET: 8.6; 5 TABLET ORAL at 21:37

## 2024-04-05 RX ADMIN — GADOTERATE MEGLUMINE 16 ML: 376.9 INJECTION INTRAVENOUS at 03:29

## 2024-04-05 SDOH — SOCIAL STABILITY: SOCIAL INSECURITY: DO YOU FEEL UNSAFE GOING BACK TO THE PLACE WHERE YOU ARE LIVING?: NO

## 2024-04-05 SDOH — SOCIAL STABILITY: SOCIAL INSECURITY: DOES ANYONE TRY TO KEEP YOU FROM HAVING/CONTACTING OTHER FRIENDS OR DOING THINGS OUTSIDE YOUR HOME?: NO

## 2024-04-05 SDOH — SOCIAL STABILITY: SOCIAL INSECURITY: ABUSE: ADULT

## 2024-04-05 SDOH — SOCIAL STABILITY: SOCIAL INSECURITY: WERE YOU ABLE TO COMPLETE ALL THE BEHAVIORAL HEALTH SCREENINGS?: YES

## 2024-04-05 SDOH — SOCIAL STABILITY: SOCIAL INSECURITY: HAVE YOU HAD THOUGHTS OF HARMING ANYONE ELSE?: NO

## 2024-04-05 SDOH — SOCIAL STABILITY: SOCIAL INSECURITY: ARE YOU OR HAVE YOU BEEN THREATENED OR ABUSED PHYSICALLY, EMOTIONALLY, OR SEXUALLY BY ANYONE?: NO

## 2024-04-05 SDOH — SOCIAL STABILITY: SOCIAL INSECURITY: HAS ANYONE EVER THREATENED TO HURT YOUR FAMILY OR YOUR PETS?: NO

## 2024-04-05 SDOH — SOCIAL STABILITY: SOCIAL INSECURITY: ARE THERE ANY APPARENT SIGNS OF INJURIES/BEHAVIORS THAT COULD BE RELATED TO ABUSE/NEGLECT?: NO

## 2024-04-05 SDOH — SOCIAL STABILITY: SOCIAL INSECURITY: DO YOU FEEL ANYONE HAS EXPLOITED OR TAKEN ADVANTAGE OF YOU FINANCIALLY OR OF YOUR PERSONAL PROPERTY?: NO

## 2024-04-05 ASSESSMENT — LIFESTYLE VARIABLES
SKIP TO QUESTIONS 9-10: 1
HOW OFTEN DO YOU HAVE A DRINK CONTAINING ALCOHOL: 2-4 TIMES A MONTH
HOW OFTEN DO YOU HAVE 6 OR MORE DRINKS ON ONE OCCASION: NEVER
HOW MANY STANDARD DRINKS CONTAINING ALCOHOL DO YOU HAVE ON A TYPICAL DAY: 1 OR 2
HOW OFTEN DO YOU HAVE A DRINK CONTAINING ALCOHOL: 2-4 TIMES A MONTH
SKIP TO QUESTIONS 9-10: 1
AUDIT-C TOTAL SCORE: 2
SUBSTANCE_ABUSE_PAST_12_MONTHS: YES
HOW MANY STANDARD DRINKS CONTAINING ALCOHOL DO YOU HAVE ON A TYPICAL DAY: 1 OR 2
HOW OFTEN DO YOU HAVE SIX OR MORE DRINKS ON ONE OCCASION: NEVER
AUDIT-C TOTAL SCORE: 2
PRESCIPTION_ABUSE_PAST_12_MONTHS: YES
AUDIT-C TOTAL SCORE: 2

## 2024-04-05 ASSESSMENT — COGNITIVE AND FUNCTIONAL STATUS - GENERAL
DAILY ACTIVITIY SCORE: 24
MOBILITY SCORE: 24
MOBILITY SCORE: 24
DAILY ACTIVITIY SCORE: 24

## 2024-04-05 ASSESSMENT — PAIN SCALES - GENERAL
PAINLEVEL_OUTOF10: 3
PAINLEVEL_OUTOF10: 0 - NO PAIN
PAINLEVEL_OUTOF10: 0 - NO PAIN
PAINLEVEL_OUTOF10: 3
PAINLEVEL_OUTOF10: 3
PAINLEVEL_OUTOF10: 6
PAINLEVEL_OUTOF10: 6
PAINLEVEL_OUTOF10: 5 - MODERATE PAIN

## 2024-04-05 ASSESSMENT — PAIN DESCRIPTION - DESCRIPTORS
DESCRIPTORS: ACHING
DESCRIPTORS: ACHING

## 2024-04-05 ASSESSMENT — PATIENT HEALTH QUESTIONNAIRE - PHQ9
1. LITTLE INTEREST OR PLEASURE IN DOING THINGS: NOT AT ALL
2. FEELING DOWN, DEPRESSED OR HOPELESS: NOT AT ALL
SUM OF ALL RESPONSES TO PHQ9 QUESTIONS 1 & 2: 0

## 2024-04-05 ASSESSMENT — PAIN - FUNCTIONAL ASSESSMENT
PAIN_FUNCTIONAL_ASSESSMENT: 0-10

## 2024-04-05 ASSESSMENT — COLUMBIA-SUICIDE SEVERITY RATING SCALE - C-SSRS
6. HAVE YOU EVER DONE ANYTHING, STARTED TO DO ANYTHING, OR PREPARED TO DO ANYTHING TO END YOUR LIFE?: NO
1. IN THE PAST MONTH, HAVE YOU WISHED YOU WERE DEAD OR WISHED YOU COULD GO TO SLEEP AND NOT WAKE UP?: NO
2. HAVE YOU ACTUALLY HAD ANY THOUGHTS OF KILLING YOURSELF?: NO

## 2024-04-05 NOTE — H&P
"History Of Present Illness  Radha Holland is a 48 y.o. female presenting with brain mass.    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 Was followed by neurosurgery at OSH but no MRI obtained. States over last 4d has had worsening HA endorses morning SHOEMAKER and also that awaken during sleep, improve as day goes on. Had had n/v, 1 episode of blurry vision. No weakness, numbness, paresthesias, fevers, chills.      Past Medical History  No past medical history on file.    Surgical History  No past surgical history on file.     Social History  She has no history on file for tobacco use, alcohol use, and drug use.    Family History  No family history on file.     Allergies  Patient has no known allergies.    Review of Systems   Negative other than above    Physical Exam  NAD  Well perfused  Equal chest rise bilaterally  Appropriate affect  No skin lesions  KASSIE    Aox3  PERRL, EOMI, FS, TM  BUE / BLE prox 5 dist 5  SILT    Last Recorded Vitals  Blood pressure (!) 165/101, pulse 82, resp. rate 18, height 1.626 m (5' 4\"), weight 77.1 kg (170 lb), SpO2 (!) 92 %.    Relevant Results        Imaging as described above     Assessment/Plan   Principal Problem:    Brain mass      48 F h/o VERN, arhtirits, HTN, known septum mass found 1 year ago, p/w worsening HA, n/v, CTH 3.2 cm intraventricular mass originating from septum (larger compared to previous read)    [facility]  MRI Brain / MRA head   Home meds  Labs  OR planning pending MRI   SCDs             Ricardo Jordan MD    "

## 2024-04-05 NOTE — ED PROVIDER NOTES
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  Cystic structure now 3.1 x 2.4 x 2.5, previously was 2.7 x 2 x 1.9.  Patient reports that she presented to ED for 4 days of headache, nausea without emesis.  Did have some resolved blurred vision yesterday but no other weakness, sensory loss or other neurologic deficits.  Does not endorse fevers, chills, recent infection or other infectious symptoms.                        No data recorded                   Patient History   No past medical history on file.  No past surgical history on file.  No family history on file.  Social History     Tobacco Use    Smoking status: Not on file    Smokeless tobacco: Not on file   Substance Use Topics    Alcohol use: Not on file    Drug use: Not on file       Physical Exam   ED Triage Vitals [04/05/24 0147]   Temp Heart Rate Respirations BP   -- 82 18 (!) 165/101      Pulse Ox Temp src Heart Rate Source Patient Position   (!) 92 % -- -- --      BP Location FiO2 (%)     -- --       Physical Exam  Constitutional:       Appearance: Normal appearance.   HENT:      Head: Normocephalic and atraumatic.   Eyes:      Extraocular Movements: Extraocular movements intact.   Cardiovascular:      Rate and Rhythm: Normal rate.   Pulmonary:      Effort: Pulmonary effort is normal.   Musculoskeletal:         General: Normal range of motion.      Cervical back: Normal range of motion.   Skin:     General: Skin is warm and dry.   Neurological:      General: No focal deficit present.      Mental Status: She is alert and oriented to person, place, and time.      Comments: Cranial nerves II through XII intact.  Alert and oriented to name, location, date, recent events.  5/5  strength in elbow flexion/extension, shoulder abduction, finger .  5/5 strength in hip flexion, knee flexion/extension, plantarflexion/dorsiflexion.  Sensation intact to light touch in upper and lower extremities.  Coordination intact heel-to-shin.   Psychiatric:         Mood and Affect: Mood normal.         Behavior: Behavior normal.         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  No neurologic deficits or progression of symptoms on ED assessment.  Blood pressure mildly elevated on initial presentation and treated with home BP medications. 130s/80s on repeat.  Neurosurgery consulted who recommended MRI, MRA and admission to [facility].  Patient admitted to [facility] for further management after MRIs.    Patient seen and discussed with Dr. Pope John Paul Fletcher MD, PhD  Emergency Medicine PGY2          Procedure  Procedures     John Paul Fletcher MD  Resident  04/05/24 0230

## 2024-04-05 NOTE — ED NOTES
Pharmacy Medication History Review    Radha Holland is a 48 y.o. female admitted for Brain mass. Pharmacy reviewed the patient's [id number] medications and allergies for accuracy.    The list below reflects the updated PTA list. Comments regarding how patient may be taking medications differently can be found in the Admit Orders Activity  Prior to Admission Medications   Prescriptions Last Dose Informant Patient Reported? Taking?   albuterol 90 mcg/actuation inhaler  Self Yes No   Sig: Inhale 2 puffs every 6 hours if needed for wheezing.   amLODIPine (Norvasc) 10 mg tablet  Self Yes No   Sig: Take 1 tablet (10 mg) by mouth once daily.   butalbital-acetaminophen-caff -40 mg tablet  Self Yes No   Sig: Take 1 tablet by mouth every 4 hours if needed for headaches.   erythromycin (Romycin) 5 mg/gram (0.5 %) ophthalmic ointment  Self Yes No   Sig: Apply 1 Application to both eyes once daily at bedtime.   hydroCHLOROthiazide (Microzide) 12.5 mg tablet  Self Yes No   Sig: Take 1 tablet (12.5 mg) by mouth once daily.   losartan (Cozaar) 25 mg tablet 4/5/2024 Self Yes No   Sig: Take 1 tablet (25 mg) by mouth once daily.   polyvinyl alcohol (Liquifilm Tears) 1.4 % ophthalmic solution  Self Yes No   Sig: Administer 1 drop into both eyes 4 times a day.      Facility-Administered Medications: None        The list below reflects the updated allergy list. Please review each documented allergy for additional clarification and justification.  Allergies  Reviewed by Bryanna Dyson PharmD on 4/5/2024   No Known Allergies         Patient accepts M2B at discharge. Pharmacy has been updated to [facility].    Sources used to complete the med history include: OARRS (nothing since 2021), [facility] 4/4, dispense history (no info), neuro surgery visit - 9/11/23    Below are additional concerns with the patient's PTA list.  - No information in dispense report -- unsure on adherence    Bryanna Dyson PharmD  Clinical  Pharmacist   [facility]  Please reach out via Secure Chat for questions, or if no response call [employer] or [facility]

## 2024-04-05 NOTE — ED TRIAGE NOTES
Pt to ED as a transfer from [facility] for a neuro consult. Ppt went to ED reporting a headache and nausea x 4 days. Pt denies any vision changes or sensitivity to light. Pt had prior hemorrhagic stroke in July with no residual deficits, OSH CT shows increased size of cyst.

## 2024-04-05 NOTE — CARE PLAN
The patient's goals for the shift include  getting rest throughout the shift.    The clinical goals for the shift include Patient will be free from injury or falls throughout shift      Problem: Pain - Adult  Goal: Verbalizes/displays adequate comfort level or baseline comfort level  Reactivated     Problem: Safety - Adult  Goal: Free from fall injury  Reactivated     Problem: Discharge Planning  Goal: Discharge to home or other facility with appropriate resources  Reactivated     Problem: Chronic Conditions and Co-morbidities  Goal: Patient's chronic conditions and co-morbidity symptoms are monitored and maintained or improved     This is a surgical and/or non-medical patient.

## 2024-04-05 NOTE — ED PROVIDER NOTES
billable procedures.      I am the Primary Clinician of Record.    FINAL IMPRESSION      1. Brain lesion    2. Nonintractable episodic headache, unspecified headache type          DISPOSITION/PLAN     DISPOSITION Decision To Transfer 04/04/2024 09:45:06 PM    Disposition: Transfer to [facility], ED to ED; Reason for transfer: Higher level of care, neurosurgery consult, services not available at this facility, brain lesion.  Patient condition is stable      PATIENT REFERRED TO:  No follow-up provider specified.    DISCHARGE MEDICATIONS:  New Prescriptions    No medications on file       DISCONTINUED MEDICATIONS:  Discontinued Medications    No medications on file                   Meryl Mendoza D.O.     Emergency Medicine      4/4/2024 11:46 PM      NOTE: This report was transcribed using voice recognition software. Every effort was made to ensure accuracy; however, inadvertent computerized transcription errors may be present            Meryl Mendoza DO  04/04/24 5086

## 2024-04-05 NOTE — PROGRESS NOTES
Care Transitions Progress Note:  Plan per medical team: Brain Mass; Plan for OR Tuesday 4/9  Team Members Present: NP: MD: BRITTANY: SHARMILA  Dispo: tbd  Status: inpatient  Payor: Gabriela Singh   Barriers:none  Adod:  5 days

## 2024-04-05 NOTE — PROGRESS NOTES
Physical Therapy                 Therapy Communication Note    Patient Name: Radha Holland  MRN: [id number]  Today's Date: 4/5/2024     Discipline: Physical Therapy    Missed Visit Reason: Missed Visit Reason: Other (Comment) (Off division.  RN also reporting that pt is up ad andres ind.)    Missed Time: Attempt    Comment:

## 2024-04-06 LAB — HOLD SPECIMEN: NORMAL

## 2024-04-06 PROCEDURE — 99233 SBSQ HOSP IP/OBS HIGH 50: CPT | Performed by: NEUROLOGICAL SURGERY

## 2024-04-06 PROCEDURE — 2500000001 HC RX 250 WO HCPCS SELF ADMINISTERED DRUGS (ALT 637 FOR MEDICARE OP): Performed by: STUDENT IN AN ORGANIZED HEALTH CARE EDUCATION/TRAINING PROGRAM

## 2024-04-06 PROCEDURE — 2500000001 HC RX 250 WO HCPCS SELF ADMINISTERED DRUGS (ALT 637 FOR MEDICARE OP)

## 2024-04-06 PROCEDURE — 2060000001 HC INTERMEDIATE ICU ROOM DAILY

## 2024-04-06 PROCEDURE — 2500000004 HC RX 250 GENERAL PHARMACY W/ HCPCS (ALT 636 FOR OP/ED): Performed by: STUDENT IN AN ORGANIZED HEALTH CARE EDUCATION/TRAINING PROGRAM

## 2024-04-06 PROCEDURE — 2500000005 HC RX 250 GENERAL PHARMACY W/O HCPCS

## 2024-04-06 RX ADMIN — OXYCODONE HYDROCHLORIDE 5 MG: 5 TABLET ORAL at 20:32

## 2024-04-06 RX ADMIN — AMLODIPINE BESYLATE 10 MG: 10 TABLET ORAL at 09:00

## 2024-04-06 RX ADMIN — ACETAMINOPHEN 650 MG: 325 TABLET ORAL at 14:16

## 2024-04-06 RX ADMIN — CARBOXYMETHYLCELLULOSE SODIUM 1 DROP: 5 SOLUTION/ DROPS OPHTHALMIC at 13:00

## 2024-04-06 RX ADMIN — LOSARTAN POTASSIUM 25 MG: 25 TABLET, FILM COATED ORAL at 09:00

## 2024-04-06 RX ADMIN — HYDROCHLOROTHIAZIDE 12.5 MG: 25 TABLET ORAL at 09:00

## 2024-04-06 RX ADMIN — SENNOSIDES AND DOCUSATE SODIUM 2 TABLET: 8.6; 5 TABLET ORAL at 20:34

## 2024-04-06 RX ADMIN — CARBOXYMETHYLCELLULOSE SODIUM 1 DROP: 5 SOLUTION/ DROPS OPHTHALMIC at 21:00

## 2024-04-06 RX ADMIN — CARBOXYMETHYLCELLULOSE SODIUM 1 DROP: 5 SOLUTION/ DROPS OPHTHALMIC at 17:00

## 2024-04-06 RX ADMIN — HEPARIN SODIUM 5000 UNITS: 5000 INJECTION INTRAVENOUS; SUBCUTANEOUS at 23:09

## 2024-04-06 RX ADMIN — HEPARIN SODIUM 5000 UNITS: 5000 INJECTION INTRAVENOUS; SUBCUTANEOUS at 06:03

## 2024-04-06 RX ADMIN — ACETAMINOPHEN 650 MG: 325 TABLET ORAL at 20:27

## 2024-04-06 RX ADMIN — HEPARIN SODIUM 5000 UNITS: 5000 INJECTION INTRAVENOUS; SUBCUTANEOUS at 14:16

## 2024-04-06 RX ADMIN — CARBOXYMETHYLCELLULOSE SODIUM 1 DROP: 5 SOLUTION/ DROPS OPHTHALMIC at 06:04

## 2024-04-06 RX ADMIN — ERYTHROMYCIN 1 CM: 5 OINTMENT OPHTHALMIC at 21:00

## 2024-04-06 ASSESSMENT — PAIN SCALES - GENERAL
PAINLEVEL_OUTOF10: 2
PAINLEVEL_OUTOF10: 4
PAINLEVEL_OUTOF10: 3

## 2024-04-06 ASSESSMENT — PAIN DESCRIPTION - DESCRIPTORS: DESCRIPTORS: ACHING

## 2024-04-06 ASSESSMENT — PAIN - FUNCTIONAL ASSESSMENT
PAIN_FUNCTIONAL_ASSESSMENT: 0-10

## 2024-04-06 NOTE — PROGRESS NOTES
"Radha Holland is a 48 y.o. female on day 1 of admission presenting with Brain mass.    Subjective   NAEON, SHOEMAKER improved       Objective     Physical Exam    Last Recorded Vitals  Blood pressure (!) 151/99, pulse 88, temperature 36.5 °C (97.7 °F), resp. rate 21, height 1.626 m (5' 4.02\"), weight 101 kg (221 lb 9 oz), SpO2 100 %.  Intake/Output last 3 Shifts:  I/O last 3 completed shifts:  In: 862.5 (8.6 mL/kg) [I.V.:862.5 (8.6 mL/kg)]  Out: 500 (5 mL/kg) [Urine:500 (0.1 mL/kg/hr)]  Weight: 100.5 kg     Relevant Results                             Assessment/Plan   Principal Problem:    Brain mass    48 F h/o arthritis, HTN, VERN (on CPAP), prior ICH (?), p/w 1 year HA, 4d worsening HA, n/v CTH 3cm intraventricular mass originating from septum, MRI Brain cystic lateral vent mass, MRA neg, 4/5 CT CAP neg    -[facility]  -OR Tues 4/8 R crani for mass resection w/ EVD  -periop medicine recs  -PTOT  -SCDs, SQH           Ricardo Jordan MD      "

## 2024-04-06 NOTE — PROGRESS NOTES
Physical Therapy                 Therapy Communication Note- PT Screen    Patient Name: Radha Holland  MRN: [id number]  Today's Date: 4/6/2024     Discipline: Physical Therapy    Missed Visit Reason: Missed Visit Reason:  (PT Screen, pt reports being up ad andres within room, no concerns for PT, RN confirms. Pt with no current acute PT needs, will d/c orders. Plan for OR this week, please re-order following sx as medically appropriate)

## 2024-04-06 NOTE — CARE PLAN
The patient's goals for the shift include  to have level of pain <5/10 by end of shift.    The clinical goals for the shift include Patient will be free from injury or fallst throughout shift.    Over the shift, the patient did not make progress toward the following goals. Barriers to progression include pain threshold, timeliness of medications. Recommendations to address these barriers include keeping patient on a pain regimen.    Problem: Pain - Adult  Goal: Verbalizes/displays adequate comfort level or baseline comfort level  Outcome: Not Progressing     Problem: Safety - Adult  Goal: Free from fall injury  Outcome: Progressing

## 2024-04-07 PROCEDURE — 2500000005 HC RX 250 GENERAL PHARMACY W/O HCPCS

## 2024-04-07 PROCEDURE — 2500000001 HC RX 250 WO HCPCS SELF ADMINISTERED DRUGS (ALT 637 FOR MEDICARE OP): Performed by: STUDENT IN AN ORGANIZED HEALTH CARE EDUCATION/TRAINING PROGRAM

## 2024-04-07 PROCEDURE — 2500000004 HC RX 250 GENERAL PHARMACY W/ HCPCS (ALT 636 FOR OP/ED): Performed by: STUDENT IN AN ORGANIZED HEALTH CARE EDUCATION/TRAINING PROGRAM

## 2024-04-07 PROCEDURE — 1100000001 HC PRIVATE ROOM DAILY

## 2024-04-07 PROCEDURE — 2500000001 HC RX 250 WO HCPCS SELF ADMINISTERED DRUGS (ALT 637 FOR MEDICARE OP)

## 2024-04-07 PROCEDURE — 99223 1ST HOSP IP/OBS HIGH 75: CPT | Performed by: ANESTHESIOLOGY

## 2024-04-07 PROCEDURE — 99232 SBSQ HOSP IP/OBS MODERATE 35: CPT | Performed by: NEUROLOGICAL SURGERY

## 2024-04-07 RX ADMIN — ACETAMINOPHEN 650 MG: 325 TABLET ORAL at 14:51

## 2024-04-07 RX ADMIN — CARBOXYMETHYLCELLULOSE SODIUM 1 DROP: 5 SOLUTION/ DROPS OPHTHALMIC at 07:00

## 2024-04-07 RX ADMIN — SENNOSIDES AND DOCUSATE SODIUM 2 TABLET: 8.6; 5 TABLET ORAL at 08:21

## 2024-04-07 RX ADMIN — ERYTHROMYCIN 1 CM: 5 OINTMENT OPHTHALMIC at 21:39

## 2024-04-07 RX ADMIN — AMLODIPINE BESYLATE 10 MG: 10 TABLET ORAL at 08:20

## 2024-04-07 RX ADMIN — SENNOSIDES AND DOCUSATE SODIUM 2 TABLET: 8.6; 5 TABLET ORAL at 21:40

## 2024-04-07 RX ADMIN — HEPARIN SODIUM 5000 UNITS: 5000 INJECTION INTRAVENOUS; SUBCUTANEOUS at 21:38

## 2024-04-07 RX ADMIN — Medication 5 MG: at 21:39

## 2024-04-07 RX ADMIN — LOSARTAN POTASSIUM 25 MG: 25 TABLET, FILM COATED ORAL at 08:20

## 2024-04-07 RX ADMIN — HEPARIN SODIUM 5000 UNITS: 5000 INJECTION INTRAVENOUS; SUBCUTANEOUS at 14:52

## 2024-04-07 RX ADMIN — OXYCODONE HYDROCHLORIDE 10 MG: 5 TABLET ORAL at 21:39

## 2024-04-07 RX ADMIN — POLYETHYLENE GLYCOL 3350 17 G: 17 POWDER, FOR SOLUTION ORAL at 08:29

## 2024-04-07 RX ADMIN — OXYCODONE HYDROCHLORIDE 5 MG: 5 TABLET ORAL at 08:20

## 2024-04-07 RX ADMIN — HYDROCHLOROTHIAZIDE 12.5 MG: 25 TABLET ORAL at 08:20

## 2024-04-07 RX ADMIN — CARBOXYMETHYLCELLULOSE SODIUM 1 DROP: 5 SOLUTION/ DROPS OPHTHALMIC at 17:34

## 2024-04-07 RX ADMIN — CARBOXYMETHYLCELLULOSE SODIUM 1 DROP: 5 SOLUTION/ DROPS OPHTHALMIC at 21:39

## 2024-04-07 RX ADMIN — HEPARIN SODIUM 5000 UNITS: 5000 INJECTION INTRAVENOUS; SUBCUTANEOUS at 08:28

## 2024-04-07 ASSESSMENT — ENCOUNTER SYMPTOMS
MUSCULOSKELETAL NEGATIVE: 1
VOMITING: 1
ALLERGIC/IMMUNOLOGIC NEGATIVE: 1
ACTIVITY CHANGE: 1
HEMATOLOGIC/LYMPHATIC NEGATIVE: 1
PSYCHIATRIC NEGATIVE: 1
RESPIRATORY NEGATIVE: 1
NAUSEA: 1
WEAKNESS: 1
ENDOCRINE NEGATIVE: 1
CARDIOVASCULAR NEGATIVE: 1
HEADACHES: 1

## 2024-04-07 ASSESSMENT — PAIN - FUNCTIONAL ASSESSMENT
PAIN_FUNCTIONAL_ASSESSMENT: 0-10

## 2024-04-07 ASSESSMENT — COGNITIVE AND FUNCTIONAL STATUS - GENERAL
MOBILITY SCORE: 24
DAILY ACTIVITIY SCORE: 24

## 2024-04-07 ASSESSMENT — PAIN SCALES - GENERAL
PAINLEVEL_OUTOF10: 1
PAINLEVEL_OUTOF10: 2
PAINLEVEL_OUTOF10: 3
PAINLEVEL_OUTOF10: 5 - MODERATE PAIN
PAINLEVEL_OUTOF10: 6

## 2024-04-07 ASSESSMENT — PAIN DESCRIPTION - LOCATION: LOCATION: HEAD

## 2024-04-07 NOTE — PROGRESS NOTES
"Occupational Therapy                 Therapy Communication Note    Patient Name: Radha Holland  MRN: [id number]  Today's Date: 4/7/2024     Discipline: Occupational Therapy    Missed Visit Reason: Per PT note, \"pt reports being up ad andres within room, no concerns for PT, RN confirms. Pt with no current acute PT needs, will d/c orders. Plan for OR this week, please re-order following sx as medically appropriate.\"  "

## 2024-04-07 NOTE — PROGRESS NOTES
"Radha Holland is a 48 y.o. female on day 2 of admission presenting with Brain mass.    Subjective   NAEON, SHOEMAKER improved       Objective     Physical Exam  Aox3  Fcx4 5/5  Last Recorded Vitals  Blood pressure (!) 137/96, pulse 76, temperature 36.3 °C (97.3 °F), temperature source Temporal, resp. rate 21, height 1.626 m (5' 4.02\"), weight 101 kg (221 lb 9 oz), SpO2 96 %.  Intake/Output last 3 Shifts:  I/O last 3 completed shifts:  In: 862.5 (8.6 mL/kg) [I.V.:862.5 (8.6 mL/kg)]  Out: 2200 (21.9 mL/kg) [Urine:2200 (0.6 mL/kg/hr)]  Weight: 100.5 kg     Relevant Results                             Assessment/Plan   Principal Problem:    Brain mass    48 F h/o arthritis, HTN, VERN (on CPAP), prior ICH (?), p/w 1 year HA, 4d worsening HA, n/v CTH 3cm intraventricular mass originating from septum, MRI Brain cystic lateral vent mass, MRA neg, 4/5 CT CAP neg    -[facility]  -OR Tues 4/8 R crani for mass resection w/ EVD  -periop medicine recs  -SCDs, SQH           Ricardo Jordan MD      "

## 2024-04-07 NOTE — CONSULTS
Reason For Consult  Preoperative evaluation    History Of Present Illness  Radha Holland is a 48 y.o. female presenting with brain mass    48 F h/o VERN, arhtirits, HTN, known septum mass found 1 year ago, p/w worsening HA, n/v, CTH 3.2 cm intraventricular mass originating from septum (larger compared to previous read).  In July 2023 diagnosed with ICH with IV extension suspected to be due to uncontrolled HTN.      Patient states 1 year ago she had acute onset HA, was told she had a brain bleed. Was followed by neurosurgery at [facility] but no MRI obtained. States over last 4d has had worsening HA endorses morning SHOEMAKER and also that awaken during sleep, improve as day goes on. Had had n/v, 1 episode of blurry vision. No weakness, numbness, paresthesias, fevers, chills.        Known Essential HTN on Amlodipine and Losartan  now with stable BP.  Pt is very complaint with home CPAP and was diagnosed with VERN about 3 yrs ago.  She was diagnosed to have Spina bifida many years ago when evaluated for chronic back pain    No h/o CAD/CHF/CKD/CVA/COPD/DVT/PE/T2DM.    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 In addition she is fully active at home with all ADLs and cares for a 1yrs old grandchild including park visits and short distance running without chest pain. Regularly uses a flight of stirs while visiting her mother 2-3 times without chest pain       Past Medical History  HTN    Surgical History  Ankle surgery  TUBAL LIGATION     Social History  She reports that she has been smoking cigarettes. She started smoking about 20 years ago. She has been smoking an average of .5 packs per day. She has never used smokeless tobacco. She reports that she does not currently use alcohol after a past usage of about 2.0 standard drinks of alcohol per week. She reports current drug use. Frequency: 1.00 time per week. Drug:  "Marijuana.    Family History  No family history on file.     Allergies  Patient has no known allergies.    Review of Systems  Review of Systems   Constitutional:  Positive for activity change.   HENT: Negative.     Eyes:  Positive for visual disturbance.   Respiratory: Negative.     Cardiovascular: Negative.    Gastrointestinal:  Positive for nausea and vomiting.   Endocrine: Negative.    Genitourinary: Negative.    Musculoskeletal: Negative.    Allergic/Immunologic: Negative.    Neurological:  Positive for weakness and headaches.   Hematological: Negative.    Psychiatric/Behavioral: Negative.           Physical Exam  AOX 3, no pedal edema, no JVD  Obese lady  SIS2  Lungs clear  Abd soft nontender     Last Recorded Vitals  Blood pressure 125/84, pulse 87, temperature 36.5 °C (97.7 °F), temperature source Temporal, resp. rate 16, height 1.626 m (5' 4.02\"), weight 101 kg (221 lb 9 oz), SpO2 99 %.    Relevant Results  No results found for this or any previous visit (from the past 24 hour(s)).      Assessment/Plan     48yr old lady with 3rd ventricular mass causing recurrent headaches and emesis.    On my eval she does nota vhe any evidence of ACS/Acute CHF/Fatal arrhythmias/ Severe valvular disease.    Her HTN is well controlled now.  Her RCRI risk score is 0/5 and she is due for Intermediate risk procedure  Based on DASI her Functional capacity si more than 5.6METS.    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 In addition she is fully active at home with all ADLs and cares for a 1yrs old grandchild including park visits and short distance running without chest pain. Regularly uses a flight of stirs while visiting her mother 2-3 times without chest pain    Based on above info she is at acceptable risk to proceed without additional cardaic testing.  Encouraged to use CPAP postop as much as possible if acceptable " from NSG perspective  She has diagnosis of Spina bifida and on exam of lumbar area no abnormalities like skin dimple or hair ivett noted  Discussed with NSG Team    Mark Du MD

## 2024-04-08 ENCOUNTER — APPOINTMENT (OUTPATIENT)
Dept: RADIOLOGY | Facility: HOSPITAL | Age: 49
End: 2024-04-08
Payer: COMMERCIAL

## 2024-04-08 ENCOUNTER — ANESTHESIA EVENT (OUTPATIENT)
Dept: OPERATING ROOM | Facility: HOSPITAL | Age: 49
End: 2024-04-08
Payer: COMMERCIAL

## 2024-04-08 LAB
ABO GROUP (TYPE) IN BLOOD: NORMAL
ALBUMIN SERPL BCP-MCNC: 3.8 G/DL (ref 3.4–5)
ANION GAP SERPL CALC-SCNC: 13 MMOL/L (ref 10–20)
ANTIBODY SCREEN: NORMAL
APTT PPP: 40 SECONDS (ref 27–38)
BUN SERPL-MCNC: 11 MG/DL (ref 6–23)
CALCIUM SERPL-MCNC: 9.5 MG/DL (ref 8.6–10.6)
CHLORIDE SERPL-SCNC: 99 MMOL/L (ref 98–107)
CO2 SERPL-SCNC: 26 MMOL/L (ref 21–32)
CREAT SERPL-MCNC: 0.72 MG/DL (ref 0.5–1.05)
EGFRCR SERPLBLD CKD-EPI 2021: >90 ML/MIN/1.73M*2
ERYTHROCYTE [DISTWIDTH] IN BLOOD BY AUTOMATED COUNT: 13.2 % (ref 11.5–14.5)
GLUCOSE SERPL-MCNC: 101 MG/DL (ref 74–99)
HCT VFR BLD AUTO: 39.6 % (ref 36–46)
HGB BLD-MCNC: 13.8 G/DL (ref 12–16)
INR PPP: 1 (ref 0.9–1.1)
MCH RBC QN AUTO: 29.1 PG (ref 26–34)
MCHC RBC AUTO-ENTMCNC: 34.8 G/DL (ref 32–36)
MCV RBC AUTO: 84 FL (ref 80–100)
NRBC BLD-RTO: 0 /100 WBCS (ref 0–0)
PHOSPHATE SERPL-MCNC: 3.7 MG/DL (ref 2.5–4.9)
PLATELET # BLD AUTO: 313 X10*3/UL (ref 150–450)
POTASSIUM SERPL-SCNC: 4.2 MMOL/L (ref 3.5–5.3)
PROTHROMBIN TIME: 11.1 SECONDS (ref 9.8–12.8)
RBC # BLD AUTO: 4.74 X10*6/UL (ref 4–5.2)
RH FACTOR (ANTIGEN D): NORMAL
SODIUM SERPL-SCNC: 134 MMOL/L (ref 136–145)
WBC # BLD AUTO: 9.4 X10*3/UL (ref 4.4–11.3)

## 2024-04-08 PROCEDURE — 2500000001 HC RX 250 WO HCPCS SELF ADMINISTERED DRUGS (ALT 637 FOR MEDICARE OP): Performed by: STUDENT IN AN ORGANIZED HEALTH CARE EDUCATION/TRAINING PROGRAM

## 2024-04-08 PROCEDURE — 85027 COMPLETE CBC AUTOMATED: CPT | Performed by: STUDENT IN AN ORGANIZED HEALTH CARE EDUCATION/TRAINING PROGRAM

## 2024-04-08 PROCEDURE — 1100000001 HC PRIVATE ROOM DAILY

## 2024-04-08 PROCEDURE — 2500000004 HC RX 250 GENERAL PHARMACY W/ HCPCS (ALT 636 FOR OP/ED): Performed by: STUDENT IN AN ORGANIZED HEALTH CARE EDUCATION/TRAINING PROGRAM

## 2024-04-08 PROCEDURE — 86920 COMPATIBILITY TEST SPIN: CPT

## 2024-04-08 PROCEDURE — 86901 BLOOD TYPING SEROLOGIC RH(D): CPT | Performed by: STUDENT IN AN ORGANIZED HEALTH CARE EDUCATION/TRAINING PROGRAM

## 2024-04-08 PROCEDURE — 70460 CT HEAD/BRAIN W/DYE: CPT

## 2024-04-08 PROCEDURE — 2500000001 HC RX 250 WO HCPCS SELF ADMINISTERED DRUGS (ALT 637 FOR MEDICARE OP)

## 2024-04-08 PROCEDURE — 85610 PROTHROMBIN TIME: CPT | Performed by: STUDENT IN AN ORGANIZED HEALTH CARE EDUCATION/TRAINING PROGRAM

## 2024-04-08 PROCEDURE — 80069 RENAL FUNCTION PANEL: CPT | Performed by: STUDENT IN AN ORGANIZED HEALTH CARE EDUCATION/TRAINING PROGRAM

## 2024-04-08 PROCEDURE — 99232 SBSQ HOSP IP/OBS MODERATE 35: CPT | Performed by: NEUROLOGICAL SURGERY

## 2024-04-08 PROCEDURE — 36415 COLL VENOUS BLD VENIPUNCTURE: CPT | Performed by: STUDENT IN AN ORGANIZED HEALTH CARE EDUCATION/TRAINING PROGRAM

## 2024-04-08 PROCEDURE — 2500000005 HC RX 250 GENERAL PHARMACY W/O HCPCS

## 2024-04-08 RX ORDER — SODIUM CHLORIDE 9 MG/ML
100 INJECTION, SOLUTION INTRAVENOUS CONTINUOUS
Status: DISCONTINUED | OUTPATIENT
Start: 2024-04-09 | End: 2024-04-09

## 2024-04-08 RX ADMIN — SENNOSIDES AND DOCUSATE SODIUM 2 TABLET: 8.6; 5 TABLET ORAL at 22:37

## 2024-04-08 RX ADMIN — Medication 5 MG: at 22:36

## 2024-04-08 RX ADMIN — CARBOXYMETHYLCELLULOSE SODIUM 1 DROP: 5 SOLUTION/ DROPS OPHTHALMIC at 16:08

## 2024-04-08 RX ADMIN — HEPARIN SODIUM 5000 UNITS: 5000 INJECTION INTRAVENOUS; SUBCUTANEOUS at 06:07

## 2024-04-08 RX ADMIN — CARBOXYMETHYLCELLULOSE SODIUM 1 DROP: 5 SOLUTION/ DROPS OPHTHALMIC at 12:40

## 2024-04-08 RX ADMIN — AMLODIPINE BESYLATE 10 MG: 10 TABLET ORAL at 09:49

## 2024-04-08 RX ADMIN — CARBOXYMETHYLCELLULOSE SODIUM 1 DROP: 5 SOLUTION/ DROPS OPHTHALMIC at 22:37

## 2024-04-08 RX ADMIN — SENNOSIDES AND DOCUSATE SODIUM 2 TABLET: 8.6; 5 TABLET ORAL at 09:49

## 2024-04-08 RX ADMIN — CARBOXYMETHYLCELLULOSE SODIUM 1 DROP: 5 SOLUTION/ DROPS OPHTHALMIC at 07:00

## 2024-04-08 RX ADMIN — LOSARTAN POTASSIUM 25 MG: 25 TABLET, FILM COATED ORAL at 09:49

## 2024-04-08 RX ADMIN — POLYETHYLENE GLYCOL 3350 17 G: 17 POWDER, FOR SOLUTION ORAL at 09:49

## 2024-04-08 RX ADMIN — ERYTHROMYCIN 1 CM: 5 OINTMENT OPHTHALMIC at 22:36

## 2024-04-08 RX ADMIN — HEPARIN SODIUM 5000 UNITS: 5000 INJECTION INTRAVENOUS; SUBCUTANEOUS at 14:22

## 2024-04-08 ASSESSMENT — COGNITIVE AND FUNCTIONAL STATUS - GENERAL
MOBILITY SCORE: 24
DAILY ACTIVITIY SCORE: 24

## 2024-04-08 ASSESSMENT — PAIN SCALES - GENERAL
PAINLEVEL_OUTOF10: 0 - NO PAIN

## 2024-04-08 ASSESSMENT — PAIN - FUNCTIONAL ASSESSMENT
PAIN_FUNCTIONAL_ASSESSMENT: 0-10

## 2024-04-08 NOTE — PROGRESS NOTES
Care Transitions Progress Note:  Plan per medical team: Brain Mass ; OR tuesday  Team Members Present: NP: MD: BRITTANY: SHARMILA  Status: inpatient  Payor:[facility]  Barriers:none  Adod: 3 days

## 2024-04-08 NOTE — ANESTHESIA PREPROCEDURE EVALUATION
"Patient: Radha Holland    Procedure Information       Date/Time: 04/09/24 1310    Procedure: R craniotomy, interhemispheric for tumor resection (Right)    Location: [facility] OR 25 / [facility] OR    Surgeons: Solo Martinez MD        ALLERGIES:  No Known Allergies     MEDICAL HISTORY:  No past medical history on file.     Relevant Problems   Cardiac   (+) HTN (hypertension)      Pulmonary   (+) Asthma   (+) VERN (obstructive sleep apnea)      Neuro  Intracranial Mass        SURGICAL HISTORY:  No past surgical history on file.     VITALS:      4/8/2024    12:51 PM 4/8/2024     8:41 AM 4/8/2024     3:56 AM   Vitals   Systolic 115 122 99   Diastolic 80 72 64   Heart Rate 81 97 68   Temp 36.6 °C (97.9 °F) 36.5 °C (97.7 °F) 36.2 °C (97.2 °F)   Resp 16 16 18       LABS:   BMP   Lab Results   Component Value Date    GLUCOSE 117 (H) 04/05/2024    CALCIUM 8.7 04/05/2024     (L) 04/05/2024    K 3.9 04/05/2024    CO2 23 04/05/2024     04/05/2024    BUN 11 04/05/2024    CREATININE 0.58 04/05/2024   , LFT No results found for: \"ALT\", \"AST\", \"GGT\", \"ALKPHOS\", \"BILITOT\", CBC  Lab Results   Component Value Date    WBC 7.8 04/05/2024    HGB 12.9 04/05/2024    HCT 40.7 04/05/2024    MCV 89 04/05/2024     04/05/2024    , Coags   Lab Results   Component Value Date/Time    PROTIME 11.2 04/05/2024 0900    INR 1.0 04/05/2024 0900    APTT 37 04/05/2024 0900    , A1C   Lab Results   Component Value Date    HGBA1C 5.5 08/09/2023       IMAGES:     , CXR       XR chest 1 view 04/05/2024    Narrative  Interpreted By:  David Torres and Stephens Katherine  STUDY:  XR CHEST 1 VIEW;  4/5/2024 6:27 am    INDICATION:  Signs/Symptoms:eval baseline.    COMPARISON:  None.    ACCESSION NUMBER(S):  [id number]    ORDERING CLINICIAN:  HASMUKH SON    FINDINGS:  AP radiograph of the chest was provided.    CARDIOMEDIASTINAL SILHOUETTE:  Cardiomediastinal silhouette is normal in size and configuration.    LUNGS:  Streaky " "bibasilar opacities favored to represent atelectasis. No  focal consolidation, pleural effusion, or pneumothorax.    ABDOMEN:  No remarkable upper abdominal findings.    BONES:  No acute osseous changes.    Impression  Bibasilar atelectasis without evidence of acute cardiopulmonary  process.    I personally reviewed the images/study and I agree with Madelyn Paz DO's (radiology resident) findings as stated. This study  was interpreted at [place], [place].    MACRO:  None    Signed by: David Torres 4/5/2024 8:15 AM  Dictation workstation:   [id number]    , CT Head/Neck       CT head wo IV contrast 04/04/2024    Narrative  EXAMINATION:  CT OF THE HEAD WITHOUT CONTRAST  4/4/2024 8:48 pm    TECHNIQUE:  CT of the head was performed without the administration of intravenous  contrast. Automated exposure control, iterative reconstruction, and/or weight  based adjustment of the mA/kV was utilized to reduce the radiation dose to as  low as reasonably achievable.    COMPARISON:  CT of the head from 09/11/2023.  MR of the brain from 08/09/2023.  CT of the  head from 07/10/2023.    HISTORY:  ORDERING SYSTEM PROVIDED HISTORY: headaches x4 days; hemorrhagic stroke in  July 2023  TECHNOLOGIST PROVIDED HISTORY:  Reason for exam:->headaches x4 days; hemorrhagic stroke in July 2023  Has a \"code stroke\" or \"stroke alert\" been called?->No  Decision Support Exception - unselect if not a suspected or confirmed  emergency medical condition->Emergency Medical Condition (MA)    FINDINGS:  BRAIN/VENTRICLES: There has been an increase in size of the cystic structure  located within the mid and posterior portion of the septum pellucidum at the  midline.  This was previously seen to have a thick wall with relative high  density.  The wall is now thin, presumably from resolution of peripheral old  hemorrhage.  The structure measures 3.1 x 2.4 cm in cross-section and 2.5 cm  in height, " previously 2.7 x 2.0 cm in cross-section and 1.9 cm in height.    There is new curvilinear high density along the superior posterior right  aspect of the cyst, a extending inferiorly to the midline, probably  calcification, but hemorrhage cannot be excluded.    There is a new component of the cyst projecting superiorly and to the left,  projecting into the mid body of the left lateral ventricle.  This region  measures 2.7 x 1.3 cm in cross-section and 1.4 cm in height.    The findings are that of evolution of an intraparenchymal hemorrhage located  within the septum pellucidum with increase in size of the residual cyst.    The increase in size of this cyst is not associated with obstruction of the  lateral ventricles or foramen of Monro at this time.  There is no sign of  hydrocephalus.    There is no acute intracranial hemorrhage, mass effect or midline shift.  No  abnormal extra-axial fluid collection.  The gray-white differentiation is  maintained without evidence of an acute infarct.  There is no evidence of  hydrocephalus.    ORBITS: The visualized portion of the orbits demonstrate no acute abnormality.    SINUSES: The visualized paranasal sinuses and mastoid air cells demonstrate  no acute abnormality.    SOFT TISSUES/SKULL:  No acute abnormality of the visualized skull or soft  tissues.    Impression  1. Evolution and increase in size of an intraparenchymal hemorrhage located  within the septum pellucidum at the midline. The cystic structure measures  3.1 x 2.4 cm in cross-section and 2.5 cm in height, previously 2.7 x 2.0 cm  in cross-section and 1.9 cm in height.  2. 3. There is new curvilinear high density along the superior posterior  right aspect of the cyst, extending inferiorly to the midline, probably  calcification, but hemorrhage cannot be excluded.  4. There is a new component of the cyst projecting superiorly and to the  left, projecting into the mid body of the left lateral ventricle.  5. The  increase in size of this cyst is not associated with obstruction of  the lateral ventricles or foramina of Monro at this time. There is no sign of  hydrocephalus.  6. No acute intracranial hemorrhage, mass effect or midline shift.    , CT Chest        CT chest abdomen pelvis w IV contrast 04/05/2024    Narrative  Interpreted By:  Stevenson uDmont and Ohs Zachary  STUDY:  CT CHEST ABDOMEN PELVIS W IV CONTRAST;  4/5/2024 3:32 pm    INDICATION:  Signs/Symptoms:metastatic work up.    COMPARISON:  None.    ACCESSION NUMBER(S):  [id number]    ORDERING CLINICIAN:  NOHEMI DAILEY    TECHNIQUE:  CT of the chest, abdomen, and pelvis was performed.  Contiguous axial  images were obtained at 3 mm slice thickness through the chest,  abdomen and pelvis in 2 mm slice thickness through the chest. Coronal  and sagittal reconstructions at 3 mm slice thickness were performed.  75 ml of contrast Omnipaque 350 were administered intravenously  without immediate complication.    FINDINGS:  CHEST:    LUNG/PLEURA/LARGE AIRWAYS:  No lung masses, pulmonary nodules or consolidations are present.  There is no pleural effusion or pneumothorax.    VESSELS:  Aorta and pulmonary arteries are normal caliber.  No atherosclerotic  changes of the aorta are identified.  No coronary artery  calcifications are present.    HEART:  The heart is normal in size.  There is no pericardial effusion.    MEDIASTINUM AND MERCEDEZ:  No mediastinal, hilar or axillary lymphadenopathy is present.  The  esophagus is normal.    CHEST WALL AND LOWER NECK:  The soft tissues of the chest wall demonstrate no gross abnormality.  The visualized thyroid gland appears within normal limits.    ABDOMEN:    LIVER:  Small hepatic hypodensity, too small to characterize, but  statistically favored to represent simple hepatic cyst (series 201,  image 67). The liver is nonenlarged.    BILE DUCTS:  The intrahepatic and extrahepatic ducts are not dilated.    GALLBLADDER:  The  gallbladder is present.    PANCREAS:  The pancreas appears unremarkable.    SPLEEN:  The spleen is normal in size without focal lesions.    ADRENAL GLANDS:  Adrenal glands appear normal.    KIDNEYS AND URETERS:  The kidneys are normal in size and enhance symmetrically.  No  hydroureteronephrosis or nephroureterolithiasis is identified.    PELVIS:    BLADDER:  The urinary bladder is decompressed, limited for evaluation.    REPRODUCTIVE ORGANS:  The uterus is present. Bilateral ovarian cysts, favored to be  functional.    BOWEL:  The stomach is unremarkable.  The small and large bowel are normal in  caliber and demonstrate no wall thickening.  The appendix appears  normal.    VESSELS:  There is no aneurysmal dilatation of the abdominal aorta. The IVC  appears normal.    PERITONEUM/RETROPERITONEUM/LYMPH NODES:  No ascites or free air, no fluid collection.  No abdominopelvic  lymphadenopathy is present.    BONE AND SOFT TISSUE:  No suspicious osseous lesions are identified. Degenerative discogenic  disease is noted in the lower thoracic and lumbar spine.  The  abdominal wall soft tissues appear normal.    Impression  CHEST:  1.  No evidence of primary malignancy/metastatic disease in the chest.    ABDOMEN-PELVIS:  1.  No evidence of primary malignancy/metastatic disease in the  abdomen/pelvis.      SOCIAL:  Social History     Tobacco Use   Smoking Status Every Day    Packs/day: .5    Types: Cigarettes    Start date: 2004   Smokeless Tobacco Never      Social History     Substance and Sexual Activity   Alcohol Use Not Currently    Alcohol/week: 2.0 standard drinks of alcohol    Types: 2 Cans of beer per week      Social History     Substance and Sexual Activity   Drug Use Yes    Frequency: 1.0 times per week    Types: Marijuana        NPO STATUS:  No data recorded    Clinical Areas Reviewed:   Tobacco  Allergies  Meds       Soc Hx      Physical Exam    Airway  Mallampati: III  TM distance: >3 FB  Neck ROM: full      Cardiovascular - normal exam     Dental - normal exam     Pulmonary - normal exam     Abdominal            Anesthesia Plan    History of general anesthesia?: no  History of complications of general anesthesia?: no    ASA 3     general     Anesthetic plan and risks discussed with patient.  Use of blood products discussed with patient who.    Plan discussed with attending.

## 2024-04-08 NOTE — CARE PLAN
Problem: Pain - Adult  Goal: Verbalizes/displays adequate comfort level or baseline comfort level  Outcome: Progressing  Flowsheets (Taken 4/8/2024 0028)  Verbalizes/displays adequate comfort level or baseline comfort level:   Encourage patient to monitor pain and request assistance   Assess pain using appropriate pain scale     Problem: Safety - Adult  Goal: Free from fall injury  Outcome: Progressing  Flowsheets (Taken 4/8/2024 0028)  Free from fall injury: Instruct family/caregiver on patient safety     Problem: Skin  Goal: Prevent/minimize sheer/friction injuries  Outcome: Progressing  Flowsheets (Taken 4/8/2024 0029)  Prevent/minimize sheer/friction injuries: Turn/reposition every 2 hours/use positioning/transfer devices   The patient's goals for the shift include      The clinical goals for the shift include pt will be free from injury throughout shift    Over the shift, the patient did make progress toward the following goals. Barriers to progression include new surroundings. Recommendations to address these barriers include use call light for assisstance.

## 2024-04-08 NOTE — CARE PLAN
Problem: Pain - Adult  Goal: Verbalizes/displays adequate comfort level or baseline comfort level  Outcome: Progressing   The patient's goals for the shift include preparing for surgery tomorrow.    The clinical goals for the shift include pt will be free from injury throughout shift    Over the shift, the patient made progress towards the states above goals.

## 2024-04-08 NOTE — PROGRESS NOTES
"Radha Holland is a 48 y.o. female on day 3 of admission presenting with Brain mass.    Subjective   NAEON, SHOEMAKER improved       Objective     Physical Exam  Aox3  Fcx4 5/5  Last Recorded Vitals  Blood pressure 99/64, pulse 68, temperature 36.2 °C (97.2 °F), resp. rate 18, height 1.626 m (5' 4.02\"), weight 101 kg (221 lb 9 oz), SpO2 98 %.  Intake/Output last 3 Shifts:  I/O last 3 completed shifts:  In: 240 (2.4 mL/kg) [P.O.:240]  Out: 3500 (34.8 mL/kg) [Urine:3500 (1 mL/kg/hr)]  Weight: 100.5 kg     Relevant Results                             Assessment/Plan   Principal Problem:    Brain mass    48 F h/o arthritis, HTN, VERN (on CPAP), prior ICH (?), p/w 1 year HA, 4d worsening HA, n/v CTH 3cm intraventricular mass originating from septum, MRI Brain cystic lateral vent mass, MRA neg, 4/5 CT CAP neg    -floor  -OR Tues 4/8 R crani for mass resection w/ EVD  -periop medicine recs - ok to proceed  -SCDs, SQH           Ricardo Jordan MD      "

## 2024-04-09 ENCOUNTER — ANESTHESIA (OUTPATIENT)
Dept: OPERATING ROOM | Facility: HOSPITAL | Age: 49
End: 2024-04-09
Payer: COMMERCIAL

## 2024-04-09 ENCOUNTER — APPOINTMENT (OUTPATIENT)
Dept: RADIOLOGY | Facility: HOSPITAL | Age: 49
End: 2024-04-09
Payer: COMMERCIAL

## 2024-04-09 PROBLEM — J45.909 ASTHMA (HHS-HCC): Status: ACTIVE | Noted: 2024-04-09

## 2024-04-09 PROBLEM — I10 HTN (HYPERTENSION): Status: ACTIVE | Noted: 2024-04-09

## 2024-04-09 PROBLEM — G47.33 OSA (OBSTRUCTIVE SLEEP APNEA): Status: ACTIVE | Noted: 2024-04-09

## 2024-04-09 LAB
ABO GROUP (TYPE) IN BLOOD: NORMAL
ALBUMIN SERPL BCP-MCNC: 3.3 G/DL (ref 3.4–5)
ANION GAP BLDA CALCULATED.4IONS-SCNC: 13 MMO/L (ref 10–25)
ANION GAP SERPL CALC-SCNC: 11 MMOL/L (ref 10–20)
ANTIBODY SCREEN: NORMAL
APTT PPP: 33 SECONDS (ref 27–38)
BASE EXCESS BLDA CALC-SCNC: -2.9 MMOL/L (ref -2–3)
BODY TEMPERATURE: 37 DEGREES CELSIUS
BUN SERPL-MCNC: 12 MG/DL (ref 6–23)
CA-I BLDA-SCNC: 1.03 MMOL/L (ref 1.1–1.33)
CALCIUM SERPL-MCNC: 8.7 MG/DL (ref 8.6–10.6)
CHLORIDE BLDA-SCNC: 98 MMOL/L (ref 98–107)
CHLORIDE SERPL-SCNC: 102 MMOL/L (ref 98–107)
CO2 SERPL-SCNC: 25 MMOL/L (ref 21–32)
CREAT SERPL-MCNC: 0.6 MG/DL (ref 0.5–1.05)
EGFRCR SERPLBLD CKD-EPI 2021: >90 ML/MIN/1.73M*2
ERYTHROCYTE [DISTWIDTH] IN BLOOD BY AUTOMATED COUNT: 13.3 % (ref 11.5–14.5)
GLUCOSE BLD MANUAL STRIP-MCNC: 146 MG/DL (ref 74–99)
GLUCOSE BLDA-MCNC: 120 MG/DL (ref 74–99)
GLUCOSE SERPL-MCNC: 92 MG/DL (ref 74–99)
HCO3 BLDA-SCNC: 20.4 MMOL/L (ref 22–26)
HCT VFR BLD AUTO: 39.1 % (ref 36–46)
HCT VFR BLD EST: 34 % (ref 36–46)
HGB BLD-MCNC: 13.1 G/DL (ref 12–16)
HGB BLDA-MCNC: 11.3 G/DL (ref 12–16)
INHALED O2 CONCENTRATION: 42 %
INR PPP: 0.9 (ref 0.9–1.1)
LACTATE BLDA-SCNC: 1.5 MMOL/L (ref 0.4–2)
MCH RBC QN AUTO: 29.6 PG (ref 26–34)
MCHC RBC AUTO-ENTMCNC: 33.5 G/DL (ref 32–36)
MCV RBC AUTO: 89 FL (ref 80–100)
NRBC BLD-RTO: 0 /100 WBCS (ref 0–0)
OXYHGB MFR BLDA: 97.1 % (ref 94–98)
PCO2 BLDA: 30 MM HG (ref 38–42)
PH BLDA: 7.44 PH (ref 7.38–7.42)
PHOSPHATE SERPL-MCNC: 3.4 MG/DL (ref 2.5–4.9)
PLATELET # BLD AUTO: 276 X10*3/UL (ref 150–450)
PO2 BLDA: 95 MM HG (ref 85–95)
POTASSIUM BLDA-SCNC: 4.3 MMOL/L (ref 3.5–5.3)
POTASSIUM SERPL-SCNC: 3.8 MMOL/L (ref 3.5–5.3)
PROTHROMBIN TIME: 10.6 SECONDS (ref 9.8–12.8)
RBC # BLD AUTO: 4.42 X10*6/UL (ref 4–5.2)
RH FACTOR (ANTIGEN D): NORMAL
SAO2 % BLDA: 98 % (ref 94–100)
SODIUM BLDA-SCNC: 127 MMOL/L (ref 136–145)
SODIUM SERPL-SCNC: 134 MMOL/L (ref 136–145)
WBC # BLD AUTO: 8 X10*3/UL (ref 4.4–11.3)

## 2024-04-09 PROCEDURE — P9045 ALBUMIN (HUMAN), 5%, 250 ML: HCPCS | Mod: JZ

## 2024-04-09 PROCEDURE — 70450 CT HEAD/BRAIN W/O DYE: CPT

## 2024-04-09 PROCEDURE — 2500000004 HC RX 250 GENERAL PHARMACY W/ HCPCS (ALT 636 FOR OP/ED): Performed by: REGISTERED NURSE

## 2024-04-09 PROCEDURE — 3700000002 HC GENERAL ANESTHESIA TIME - EACH INCREMENTAL 1 MINUTE: Performed by: NEUROLOGICAL SURGERY

## 2024-04-09 PROCEDURE — 2780000003 HC OR 278 NO HCPCS: Performed by: NEUROLOGICAL SURGERY

## 2024-04-09 PROCEDURE — 2500000005 HC RX 250 GENERAL PHARMACY W/O HCPCS: Performed by: NEUROLOGICAL SURGERY

## 2024-04-09 PROCEDURE — 84132 ASSAY OF SERUM POTASSIUM: CPT

## 2024-04-09 PROCEDURE — C1763 CONN TISS, NON-HUMAN: HCPCS | Performed by: NEUROLOGICAL SURGERY

## 2024-04-09 PROCEDURE — 70450 CT HEAD/BRAIN W/O DYE: CPT | Performed by: RADIOLOGY

## 2024-04-09 PROCEDURE — 88307 TISSUE EXAM BY PATHOLOGIST: CPT | Mod: TC,SUR | Performed by: NEUROLOGICAL SURGERY

## 2024-04-09 PROCEDURE — 2500000001 HC RX 250 WO HCPCS SELF ADMINISTERED DRUGS (ALT 637 FOR MEDICARE OP): Performed by: STUDENT IN AN ORGANIZED HEALTH CARE EDUCATION/TRAINING PROGRAM

## 2024-04-09 PROCEDURE — 009630Z DRAINAGE OF CEREBRAL VENTRICLE WITH DRAINAGE DEVICE, PERCUTANEOUS APPROACH: ICD-10-PCS | Performed by: NEUROLOGICAL SURGERY

## 2024-04-09 PROCEDURE — 2500000004 HC RX 250 GENERAL PHARMACY W/ HCPCS (ALT 636 FOR OP/ED)

## 2024-04-09 PROCEDURE — 2500000001 HC RX 250 WO HCPCS SELF ADMINISTERED DRUGS (ALT 637 FOR MEDICARE OP): Performed by: NEUROLOGICAL SURGERY

## 2024-04-09 PROCEDURE — 36620 INSERTION CATHETER ARTERY: CPT | Performed by: ANESTHESIOLOGY

## 2024-04-09 PROCEDURE — 99291 CRITICAL CARE FIRST HOUR: CPT

## 2024-04-09 PROCEDURE — 85027 COMPLETE CBC AUTOMATED: CPT | Performed by: STUDENT IN AN ORGANIZED HEALTH CARE EDUCATION/TRAINING PROGRAM

## 2024-04-09 PROCEDURE — 86901 BLOOD TYPING SEROLOGIC RH(D): CPT | Performed by: STUDENT IN AN ORGANIZED HEALTH CARE EDUCATION/TRAINING PROGRAM

## 2024-04-09 PROCEDURE — 2500000005 HC RX 250 GENERAL PHARMACY W/O HCPCS

## 2024-04-09 PROCEDURE — C1713 ANCHOR/SCREW BN/BN,TIS/BN: HCPCS | Performed by: NEUROLOGICAL SURGERY

## 2024-04-09 PROCEDURE — 3700000001 HC GENERAL ANESTHESIA TIME - INITIAL BASE CHARGE: Performed by: NEUROLOGICAL SURGERY

## 2024-04-09 PROCEDURE — 2500000004 HC RX 250 GENERAL PHARMACY W/ HCPCS (ALT 636 FOR OP/ED): Performed by: STUDENT IN AN ORGANIZED HEALTH CARE EDUCATION/TRAINING PROGRAM

## 2024-04-09 PROCEDURE — 2500000001 HC RX 250 WO HCPCS SELF ADMINISTERED DRUGS (ALT 637 FOR MEDICARE OP)

## 2024-04-09 PROCEDURE — C1729 CATH, DRAINAGE: HCPCS | Performed by: NEUROLOGICAL SURGERY

## 2024-04-09 PROCEDURE — 2720000007 HC OR 272 NO HCPCS: Performed by: NEUROLOGICAL SURGERY

## 2024-04-09 PROCEDURE — 3600000005 HC OR TIME - INITIAL BASE CHARGE - PROCEDURE LEVEL FIVE: Performed by: NEUROLOGICAL SURGERY

## 2024-04-09 PROCEDURE — 2020000001 HC ICU ROOM DAILY

## 2024-04-09 PROCEDURE — 3600000010 HC OR TIME - EACH INCREMENTAL 1 MINUTE - PROCEDURE LEVEL FIVE: Performed by: NEUROLOGICAL SURGERY

## 2024-04-09 PROCEDURE — 88307 TISSUE EXAM BY PATHOLOGIST: CPT | Performed by: PATHOLOGY

## 2024-04-09 PROCEDURE — 80069 RENAL FUNCTION PANEL: CPT | Performed by: STUDENT IN AN ORGANIZED HEALTH CARE EDUCATION/TRAINING PROGRAM

## 2024-04-09 PROCEDURE — 85610 PROTHROMBIN TIME: CPT | Performed by: STUDENT IN AN ORGANIZED HEALTH CARE EDUCATION/TRAINING PROGRAM

## 2024-04-09 PROCEDURE — 82947 ASSAY GLUCOSE BLOOD QUANT: CPT

## 2024-04-09 PROCEDURE — 00B60ZZ EXCISION OF CEREBRAL VENTRICLE, OPEN APPROACH: ICD-10-PCS | Performed by: NEUROLOGICAL SURGERY

## 2024-04-09 PROCEDURE — 36415 COLL VENOUS BLD VENIPUNCTURE: CPT | Performed by: STUDENT IN AN ORGANIZED HEALTH CARE EDUCATION/TRAINING PROGRAM

## 2024-04-09 PROCEDURE — A61500 PR CRANIECT EXCIS SKULL BONE LESN: Performed by: ANESTHESIOLOGY

## 2024-04-09 DEVICE — VENTRICLEAR II VENTRICULAR DRAINAGE CATHETER
Type: IMPLANTABLE DEVICE | Site: BRAIN | Status: FUNCTIONAL
Brand: VENTRICLEAR

## 2024-04-09 DEVICE — PLATE, 2H 10MM BAR ULTRA LOW PROFILE: Type: IMPLANTABLE DEVICE | Site: CRANIAL | Status: FUNCTIONAL

## 2024-04-09 DEVICE — DURAGEN® PLUS DURAL REGENERATION MATRIX, 3 IN X 3 IN (7.5 CM X 7.5 CM)
Type: IMPLANTABLE DEVICE | Site: CRANIAL | Status: FUNCTIONAL
Brand: DURAGEN® PLUS

## 2024-04-09 DEVICE — IMPLANTABLE DEVICE: Type: IMPLANTABLE DEVICE | Site: CRANIAL | Status: FUNCTIONAL

## 2024-04-09 DEVICE — CROSS PIN, AXS SELF-TAP, 1.5 X 4MM: Type: IMPLANTABLE DEVICE | Site: CRANIAL | Status: FUNCTIONAL

## 2024-04-09 RX ORDER — ESMOLOL HYDROCHLORIDE 10 MG/ML
INJECTION INTRAVENOUS AS NEEDED
Status: DISCONTINUED | OUTPATIENT
Start: 2024-04-09 | End: 2024-04-09

## 2024-04-09 RX ORDER — MEPERIDINE HYDROCHLORIDE 25 MG/ML
12.5 INJECTION INTRAMUSCULAR; INTRAVENOUS; SUBCUTANEOUS EVERY 10 MIN PRN
Status: DISCONTINUED | OUTPATIENT
Start: 2024-04-09 | End: 2024-04-09

## 2024-04-09 RX ORDER — MANNITOL 20 G/100ML
INJECTION, SOLUTION INTRAVENOUS CONTINUOUS PRN
Status: DISCONTINUED | OUTPATIENT
Start: 2024-04-09 | End: 2024-04-09

## 2024-04-09 RX ORDER — LIDOCAINE HYDROCHLORIDE 20 MG/ML
INJECTION, SOLUTION INFILTRATION; PERINEURAL AS NEEDED
Status: DISCONTINUED | OUTPATIENT
Start: 2024-04-09 | End: 2024-04-09

## 2024-04-09 RX ORDER — ROCURONIUM BROMIDE 10 MG/ML
INJECTION, SOLUTION INTRAVENOUS AS NEEDED
Status: DISCONTINUED | OUTPATIENT
Start: 2024-04-09 | End: 2024-04-09

## 2024-04-09 RX ORDER — CEFAZOLIN SODIUM 2 G/100ML
2 INJECTION, SOLUTION INTRAVENOUS EVERY 8 HOURS
Status: DISCONTINUED | OUTPATIENT
Start: 2024-04-10 | End: 2024-04-16

## 2024-04-09 RX ORDER — SODIUM CHLORIDE 9 MG/ML
100 INJECTION, SOLUTION INTRAVENOUS CONTINUOUS
Status: DISCONTINUED | OUTPATIENT
Start: 2024-04-09 | End: 2024-04-11

## 2024-04-09 RX ORDER — MIDAZOLAM HYDROCHLORIDE 1 MG/ML
1 INJECTION INTRAMUSCULAR; INTRAVENOUS ONCE AS NEEDED
Status: DISCONTINUED | OUTPATIENT
Start: 2024-04-09 | End: 2024-04-09

## 2024-04-09 RX ORDER — ONDANSETRON HYDROCHLORIDE 2 MG/ML
4 INJECTION, SOLUTION INTRAVENOUS EVERY 8 HOURS PRN
Status: DISCONTINUED | OUTPATIENT
Start: 2024-04-09 | End: 2024-04-17 | Stop reason: HOSPADM

## 2024-04-09 RX ORDER — PROMETHAZINE HYDROCHLORIDE 25 MG/1
25 TABLET ORAL EVERY 6 HOURS PRN
Status: DISCONTINUED | OUTPATIENT
Start: 2024-04-09 | End: 2024-04-17 | Stop reason: HOSPADM

## 2024-04-09 RX ORDER — DEXTROSE 50 % IN WATER (D50W) INTRAVENOUS SYRINGE
25
Status: DISCONTINUED | OUTPATIENT
Start: 2024-04-09 | End: 2024-04-16

## 2024-04-09 RX ORDER — LABETALOL HYDROCHLORIDE 5 MG/ML
INJECTION, SOLUTION INTRAVENOUS AS NEEDED
Status: DISCONTINUED | OUTPATIENT
Start: 2024-04-09 | End: 2024-04-09

## 2024-04-09 RX ORDER — DIPHENHYDRAMINE HYDROCHLORIDE 50 MG/ML
12.5 INJECTION INTRAMUSCULAR; INTRAVENOUS EVERY 6 HOURS PRN
Status: DISCONTINUED | OUTPATIENT
Start: 2024-04-09 | End: 2024-04-17 | Stop reason: HOSPADM

## 2024-04-09 RX ORDER — DEXAMETHASONE 4 MG/1
4 TABLET ORAL EVERY 6 HOURS SCHEDULED
Status: DISCONTINUED | OUTPATIENT
Start: 2024-04-09 | End: 2024-04-10

## 2024-04-09 RX ORDER — DEXTROSE 50 % IN WATER (D50W) INTRAVENOUS SYRINGE
12.5
Status: DISCONTINUED | OUTPATIENT
Start: 2024-04-09 | End: 2024-04-16

## 2024-04-09 RX ORDER — LIDOCAINE HYDROCHLORIDE 10 MG/ML
0.1 INJECTION INFILTRATION; PERINEURAL ONCE
Status: DISCONTINUED | OUTPATIENT
Start: 2024-04-09 | End: 2024-04-09

## 2024-04-09 RX ORDER — CYCLOBENZAPRINE HCL 10 MG
10 TABLET ORAL 3 TIMES DAILY PRN
Status: DISCONTINUED | OUTPATIENT
Start: 2024-04-09 | End: 2024-04-17 | Stop reason: HOSPADM

## 2024-04-09 RX ORDER — PROPOFOL 10 MG/ML
INJECTION, EMULSION INTRAVENOUS CONTINUOUS PRN
Status: DISCONTINUED | OUTPATIENT
Start: 2024-04-09 | End: 2024-04-09

## 2024-04-09 RX ORDER — PANTOPRAZOLE SODIUM 40 MG/1
40 TABLET, DELAYED RELEASE ORAL
Status: DISCONTINUED | OUTPATIENT
Start: 2024-04-10 | End: 2024-04-17 | Stop reason: HOSPADM

## 2024-04-09 RX ORDER — BACITRACIN 500 [USP'U]/G
OINTMENT TOPICAL AS NEEDED
Status: DISCONTINUED | OUTPATIENT
Start: 2024-04-09 | End: 2024-04-09 | Stop reason: HOSPADM

## 2024-04-09 RX ORDER — LABETALOL HYDROCHLORIDE 5 MG/ML
10 INJECTION, SOLUTION INTRAVENOUS EVERY 10 MIN PRN
Status: DISCONTINUED | OUTPATIENT
Start: 2024-04-09 | End: 2024-04-17 | Stop reason: HOSPADM

## 2024-04-09 RX ORDER — NALOXONE HYDROCHLORIDE 0.4 MG/ML
0.2 INJECTION, SOLUTION INTRAMUSCULAR; INTRAVENOUS; SUBCUTANEOUS EVERY 5 MIN PRN
Status: DISCONTINUED | OUTPATIENT
Start: 2024-04-09 | End: 2024-04-17 | Stop reason: HOSPADM

## 2024-04-09 RX ORDER — SODIUM CHLORIDE, SODIUM LACTATE, POTASSIUM CHLORIDE, CALCIUM CHLORIDE 600; 310; 30; 20 MG/100ML; MG/100ML; MG/100ML; MG/100ML
100 INJECTION, SOLUTION INTRAVENOUS CONTINUOUS
Status: DISCONTINUED | OUTPATIENT
Start: 2024-04-09 | End: 2024-04-09

## 2024-04-09 RX ORDER — HEPARIN SODIUM 5000 [USP'U]/ML
5000 INJECTION, SOLUTION INTRAVENOUS; SUBCUTANEOUS EVERY 8 HOURS
Status: DISCONTINUED | OUTPATIENT
Start: 2024-04-11 | End: 2024-04-10

## 2024-04-09 RX ORDER — ONDANSETRON 4 MG/1
4 TABLET, FILM COATED ORAL EVERY 8 HOURS PRN
Status: DISCONTINUED | OUTPATIENT
Start: 2024-04-09 | End: 2024-04-17 | Stop reason: HOSPADM

## 2024-04-09 RX ORDER — HYDRALAZINE HYDROCHLORIDE 20 MG/ML
10 INJECTION INTRAMUSCULAR; INTRAVENOUS
Status: DISCONTINUED | OUTPATIENT
Start: 2024-04-09 | End: 2024-04-17 | Stop reason: HOSPADM

## 2024-04-09 RX ORDER — ACETAMINOPHEN 325 MG/1
650 TABLET ORAL EVERY 4 HOURS PRN
Status: DISCONTINUED | OUTPATIENT
Start: 2024-04-09 | End: 2024-04-09 | Stop reason: SDUPTHER

## 2024-04-09 RX ORDER — PANTOPRAZOLE SODIUM 40 MG/10ML
40 INJECTION, POWDER, LYOPHILIZED, FOR SOLUTION INTRAVENOUS
Status: DISCONTINUED | OUTPATIENT
Start: 2024-04-10 | End: 2024-04-17 | Stop reason: HOSPADM

## 2024-04-09 RX ORDER — HYDROMORPHONE HYDROCHLORIDE 1 MG/ML
0.2 INJECTION, SOLUTION INTRAMUSCULAR; INTRAVENOUS; SUBCUTANEOUS EVERY 4 HOURS PRN
Status: DISCONTINUED | OUTPATIENT
Start: 2024-04-09 | End: 2024-04-17 | Stop reason: HOSPADM

## 2024-04-09 RX ORDER — CEFAZOLIN 1 G/1
INJECTION, POWDER, FOR SOLUTION INTRAVENOUS AS NEEDED
Status: DISCONTINUED | OUTPATIENT
Start: 2024-04-09 | End: 2024-04-09

## 2024-04-09 RX ORDER — LABETALOL HYDROCHLORIDE 5 MG/ML
5 INJECTION, SOLUTION INTRAVENOUS EVERY 5 MIN PRN
Status: DISCONTINUED | OUTPATIENT
Start: 2024-04-09 | End: 2024-04-09 | Stop reason: SDUPTHER

## 2024-04-09 RX ORDER — FENTANYL CITRATE 50 UG/ML
INJECTION, SOLUTION INTRAMUSCULAR; INTRAVENOUS AS NEEDED
Status: DISCONTINUED | OUTPATIENT
Start: 2024-04-09 | End: 2024-04-09

## 2024-04-09 RX ORDER — OXYCODONE HYDROCHLORIDE 5 MG/1
5 TABLET ORAL EVERY 4 HOURS PRN
Status: DISCONTINUED | OUTPATIENT
Start: 2024-04-09 | End: 2024-04-17 | Stop reason: HOSPADM

## 2024-04-09 RX ORDER — ONDANSETRON HYDROCHLORIDE 2 MG/ML
INJECTION, SOLUTION INTRAVENOUS AS NEEDED
Status: DISCONTINUED | OUTPATIENT
Start: 2024-04-09 | End: 2024-04-09

## 2024-04-09 RX ORDER — OXYCODONE HYDROCHLORIDE 5 MG/1
2.5 TABLET ORAL EVERY 4 HOURS PRN
Status: DISCONTINUED | OUTPATIENT
Start: 2024-04-09 | End: 2024-04-17 | Stop reason: HOSPADM

## 2024-04-09 RX ORDER — ALBUMIN HUMAN 50 G/1000ML
SOLUTION INTRAVENOUS AS NEEDED
Status: DISCONTINUED | OUTPATIENT
Start: 2024-04-09 | End: 2024-04-09

## 2024-04-09 RX ORDER — INSULIN LISPRO 100 [IU]/ML
0-5 INJECTION, SOLUTION INTRAVENOUS; SUBCUTANEOUS
Status: DISCONTINUED | OUTPATIENT
Start: 2024-04-10 | End: 2024-04-16

## 2024-04-09 RX ORDER — AMOXICILLIN 250 MG
2 CAPSULE ORAL 2 TIMES DAILY
Status: DISCONTINUED | OUTPATIENT
Start: 2024-04-09 | End: 2024-04-17 | Stop reason: HOSPADM

## 2024-04-09 RX ORDER — ACETAMINOPHEN 325 MG/1
650 TABLET ORAL EVERY 6 HOURS
Status: DISCONTINUED | OUTPATIENT
Start: 2024-04-09 | End: 2024-04-17 | Stop reason: HOSPADM

## 2024-04-09 RX ORDER — PROPOFOL 10 MG/ML
INJECTION, EMULSION INTRAVENOUS AS NEEDED
Status: DISCONTINUED | OUTPATIENT
Start: 2024-04-09 | End: 2024-04-09

## 2024-04-09 RX ORDER — HYDROMORPHONE HYDROCHLORIDE 1 MG/ML
0.5 INJECTION, SOLUTION INTRAMUSCULAR; INTRAVENOUS; SUBCUTANEOUS EVERY 5 MIN PRN
Status: DISCONTINUED | OUTPATIENT
Start: 2024-04-09 | End: 2024-04-09

## 2024-04-09 RX ORDER — PROMETHAZINE HYDROCHLORIDE 25 MG/1
25 SUPPOSITORY RECTAL EVERY 12 HOURS PRN
Status: DISCONTINUED | OUTPATIENT
Start: 2024-04-09 | End: 2024-04-17 | Stop reason: HOSPADM

## 2024-04-09 RX ORDER — POLYETHYLENE GLYCOL 3350 17 G/17G
17 POWDER, FOR SOLUTION ORAL DAILY
Status: DISCONTINUED | OUTPATIENT
Start: 2024-04-09 | End: 2024-04-17 | Stop reason: HOSPADM

## 2024-04-09 RX ORDER — OXYCODONE HYDROCHLORIDE 5 MG/1
10 TABLET ORAL EVERY 4 HOURS PRN
Status: DISCONTINUED | OUTPATIENT
Start: 2024-04-09 | End: 2024-04-17 | Stop reason: HOSPADM

## 2024-04-09 RX ORDER — ALBUTEROL SULFATE 0.83 MG/ML
2.5 SOLUTION RESPIRATORY (INHALATION) ONCE AS NEEDED
Status: DISCONTINUED | OUTPATIENT
Start: 2024-04-09 | End: 2024-04-09

## 2024-04-09 RX ORDER — OXYCODONE HYDROCHLORIDE 5 MG/1
10 TABLET ORAL EVERY 4 HOURS PRN
Status: DISCONTINUED | OUTPATIENT
Start: 2024-04-09 | End: 2024-04-09

## 2024-04-09 RX ORDER — METOCLOPRAMIDE HYDROCHLORIDE 5 MG/ML
5 INJECTION INTRAMUSCULAR; INTRAVENOUS ONCE
Status: COMPLETED | OUTPATIENT
Start: 2024-04-09 | End: 2024-04-09

## 2024-04-09 RX ORDER — HYDROMORPHONE HYDROCHLORIDE 1 MG/ML
0.2 INJECTION, SOLUTION INTRAMUSCULAR; INTRAVENOUS; SUBCUTANEOUS EVERY 5 MIN PRN
Status: DISCONTINUED | OUTPATIENT
Start: 2024-04-09 | End: 2024-04-09

## 2024-04-09 RX ADMIN — PROPOFOL 30 MG: 10 INJECTION, EMULSION INTRAVENOUS at 18:28

## 2024-04-09 RX ADMIN — CEFAZOLIN 2 G: 1 INJECTION, POWDER, FOR SOLUTION INTRAMUSCULAR; INTRAVENOUS at 14:27

## 2024-04-09 RX ADMIN — DEXAMETHASONE 4 MG: 4 TABLET ORAL at 20:43

## 2024-04-09 RX ADMIN — METOCLOPRAMIDE HYDROCHLORIDE 5 MG: 5 INJECTION INTRAMUSCULAR; INTRAVENOUS at 20:14

## 2024-04-09 RX ADMIN — PROMETHAZINE HYDROCHLORIDE 6.25 MG: 25 INJECTION INTRAMUSCULAR; INTRAVENOUS at 21:36

## 2024-04-09 RX ADMIN — FENTANYL CITRATE 100 MCG: 50 INJECTION, SOLUTION INTRAMUSCULAR; INTRAVENOUS at 13:37

## 2024-04-09 RX ADMIN — ROCURONIUM 10 MG: 50 INJECTION, SOLUTION INTRAVENOUS at 16:34

## 2024-04-09 RX ADMIN — AMLODIPINE BESYLATE 10 MG: 10 TABLET ORAL at 09:20

## 2024-04-09 RX ADMIN — FENTANYL CITRATE 50 MCG: 50 INJECTION, SOLUTION INTRAMUSCULAR; INTRAVENOUS at 14:28

## 2024-04-09 RX ADMIN — ESMOLOL HYDROCHLORIDE 30 MG: 10 INJECTION, SOLUTION INTRAVENOUS at 18:26

## 2024-04-09 RX ADMIN — ROCURONIUM 70 MG: 50 INJECTION, SOLUTION INTRAVENOUS at 13:36

## 2024-04-09 RX ADMIN — ESMOLOL HYDROCHLORIDE 20 MG: 10 INJECTION, SOLUTION INTRAVENOUS at 18:31

## 2024-04-09 RX ADMIN — CEFAZOLIN 2 G: 1 INJECTION, POWDER, FOR SOLUTION INTRAMUSCULAR; INTRAVENOUS at 18:24

## 2024-04-09 RX ADMIN — LABETALOL HYDROCHLORIDE 5 MG: 5 INJECTION INTRAVENOUS at 18:44

## 2024-04-09 RX ADMIN — LABETALOL HYDROCHLORIDE 5 MG: 5 INJECTION INTRAVENOUS at 15:50

## 2024-04-09 RX ADMIN — LABETALOL HYDROCHLORIDE 5 MG: 5 INJECTION INTRAVENOUS at 15:54

## 2024-04-09 RX ADMIN — PROPOFOL 50 MG: 10 INJECTION, EMULSION INTRAVENOUS at 14:22

## 2024-04-09 RX ADMIN — PROPOFOL 10 MG: 10 INJECTION, EMULSION INTRAVENOUS at 18:12

## 2024-04-09 RX ADMIN — PROPOFOL 20 MG: 10 INJECTION, EMULSION INTRAVENOUS at 18:31

## 2024-04-09 RX ADMIN — LABETALOL HYDROCHLORIDE 10 MG: 5 INJECTION, SOLUTION INTRAVENOUS at 22:07

## 2024-04-09 RX ADMIN — PROPOFOL 100 MCG/KG/MIN: 10 INJECTION, EMULSION INTRAVENOUS at 14:19

## 2024-04-09 RX ADMIN — OXYCODONE HYDROCHLORIDE 10 MG: 5 TABLET ORAL at 20:42

## 2024-04-09 RX ADMIN — HYDROMORPHONE HYDROCHLORIDE 0.2 MG: 1 INJECTION, SOLUTION INTRAMUSCULAR; INTRAVENOUS; SUBCUTANEOUS at 19:19

## 2024-04-09 RX ADMIN — HYDRALAZINE HYDROCHLORIDE 10 MG: 20 INJECTION INTRAMUSCULAR; INTRAVENOUS at 23:43

## 2024-04-09 RX ADMIN — SODIUM CHLORIDE 100 ML/HR: 9 INJECTION, SOLUTION INTRAVENOUS at 00:00

## 2024-04-09 RX ADMIN — ESMOLOL HYDROCHLORIDE 30 MG: 10 INJECTION, SOLUTION INTRAVENOUS at 18:27

## 2024-04-09 RX ADMIN — ONDANSETRON 4 MG: 2 INJECTION INTRAMUSCULAR; INTRAVENOUS at 19:53

## 2024-04-09 RX ADMIN — LABETALOL HYDROCHLORIDE 5 MG: 5 INJECTION INTRAVENOUS at 16:09

## 2024-04-09 RX ADMIN — SODIUM CHLORIDE, SODIUM LACTATE, POTASSIUM CHLORIDE, AND CALCIUM CHLORIDE: 600; 310; 30; 20 INJECTION, SOLUTION INTRAVENOUS at 13:32

## 2024-04-09 RX ADMIN — PROPOFOL 10 MG: 10 INJECTION, EMULSION INTRAVENOUS at 17:54

## 2024-04-09 RX ADMIN — LABETALOL HYDROCHLORIDE 5 MG: 5 INJECTION INTRAVENOUS at 18:12

## 2024-04-09 RX ADMIN — PROPOFOL 100 MG: 10 INJECTION, EMULSION INTRAVENOUS at 14:01

## 2024-04-09 RX ADMIN — FENTANYL CITRATE 50 MCG: 50 INJECTION, SOLUTION INTRAMUSCULAR; INTRAVENOUS at 15:06

## 2024-04-09 RX ADMIN — ESMOLOL HYDROCHLORIDE 50 MG: 10 INJECTION, SOLUTION INTRAVENOUS at 14:00

## 2024-04-09 RX ADMIN — ONDANSETRON 4 MG: 2 INJECTION, SOLUTION INTRAMUSCULAR; INTRAVENOUS at 17:30

## 2024-04-09 RX ADMIN — ACETAMINOPHEN 650 MG: 325 TABLET ORAL at 20:43

## 2024-04-09 RX ADMIN — PROPOFOL 10 MG: 10 INJECTION, EMULSION INTRAVENOUS at 18:18

## 2024-04-09 RX ADMIN — LABETALOL HYDROCHLORIDE 5 MG: 5 INJECTION INTRAVENOUS at 18:01

## 2024-04-09 RX ADMIN — ESMOLOL HYDROCHLORIDE 50 MG: 10 INJECTION, SOLUTION INTRAVENOUS at 13:59

## 2024-04-09 RX ADMIN — ROCURONIUM 20 MG: 50 INJECTION, SOLUTION INTRAVENOUS at 14:30

## 2024-04-09 RX ADMIN — SUGAMMADEX 200 MG: 100 INJECTION, SOLUTION INTRAVENOUS at 18:32

## 2024-04-09 RX ADMIN — DEXAMETHASONE SODIUM PHOSPHATE 10 MG: 4 INJECTION INTRA-ARTICULAR; INTRALESIONAL; INTRAMUSCULAR; INTRAVENOUS; SOFT TISSUE at 13:33

## 2024-04-09 RX ADMIN — LABETALOL HYDROCHLORIDE 10 MG: 5 INJECTION INTRAVENOUS at 18:18

## 2024-04-09 RX ADMIN — PROPOFOL 10 MG: 10 INJECTION, EMULSION INTRAVENOUS at 17:59

## 2024-04-09 RX ADMIN — LIDOCAINE HYDROCHLORIDE 100 MG: 20 INJECTION, SOLUTION INFILTRATION; PERINEURAL at 13:38

## 2024-04-09 RX ADMIN — PROPOFOL 200 MG: 10 INJECTION, EMULSION INTRAVENOUS at 13:35

## 2024-04-09 RX ADMIN — ALBUMIN (HUMAN) 250 ML: 12.5 SOLUTION INTRAVENOUS at 14:43

## 2024-04-09 RX ADMIN — ROCURONIUM 10 MG: 50 INJECTION, SOLUTION INTRAVENOUS at 15:43

## 2024-04-09 RX ADMIN — SODIUM CHLORIDE 100 ML/HR: 9 INJECTION, SOLUTION INTRAVENOUS at 20:25

## 2024-04-09 RX ADMIN — MANNITOL: 20 INJECTION, SOLUTION INTRAVENOUS at 15:38

## 2024-04-09 RX ADMIN — PROPOFOL 10 MG: 10 INJECTION, EMULSION INTRAVENOUS at 18:01

## 2024-04-09 ASSESSMENT — PAIN - FUNCTIONAL ASSESSMENT
PAIN_FUNCTIONAL_ASSESSMENT: 0-10

## 2024-04-09 ASSESSMENT — PAIN SCALES - GENERAL
PAINLEVEL_OUTOF10: 10 - WORST POSSIBLE PAIN
PAINLEVEL_OUTOF10: 0 - NO PAIN
PAINLEVEL_OUTOF10: 0 - NO PAIN
PAINLEVEL_OUTOF10: 10 - WORST POSSIBLE PAIN
PAINLEVEL_OUTOF10: 0 - NO PAIN

## 2024-04-09 ASSESSMENT — PAIN DESCRIPTION - DESCRIPTORS: DESCRIPTORS: ACHING

## 2024-04-09 NOTE — PROGRESS NOTES
"Radha Holland is a 48 y.o. female on day 4 of admission presenting with Brain mass.    Subjective   NAEON       Objective     Physical Exam  Aox3  Fcx4 5/5  Last Recorded Vitals  Blood pressure 116/78, pulse 69, temperature 35.7 °C (96.3 °F), resp. rate 16, height 1.626 m (5' 4.02\"), weight 101 kg (221 lb 9 oz), SpO2 99 %.  Intake/Output last 3 Shifts:  I/O last 3 completed shifts:  In: 240 (2.4 mL/kg) [P.O.:240]  Out: 400 (4 mL/kg) [Urine:400 (0.1 mL/kg/hr)]  Weight: 100.5 kg     Relevant Results                             Assessment/Plan   Principal Problem:    Brain mass    48 F h/o arthritis, HTN, VERN (on CPAP), prior ICH (?), p/w 1 year HA, 4d worsening HA, n/v CTH 3cm intraventricular mass originating from septum, MRI Brain cystic lateral vent mass, MRA neg, 4/5 CT CAP neg    -floor --> will be NSU after surgery  -OR Tues 4/8 R crani for mass resection w/ EVD  -periop medicine recs - ok to proceed  -SCDs, SQH           Ricardo Jordan MD      "

## 2024-04-09 NOTE — ANESTHESIA PROCEDURE NOTES
Peripheral IV  Date/Time: 4/9/2024 2:00 PM      Placement  Needle size: 16 G  Laterality: left  Location: hand  Local anesthetic: none  Site prep: alcohol  Technique: anatomical landmarks  Attempts: 1

## 2024-04-09 NOTE — ANESTHESIA PROCEDURE NOTES
Arterial Line:    Date/Time: 4/9/2024 1:55 PM    Staffing  Performed: resident   Authorized by: Solo Rivera MD PhD    Performed by: KEATON Henriquez    An arterial line was placed. in the OR for the following indication(s): continuous blood pressure monitoring.    A 20 gauge (size), 1 and 3/4 inch (length), Angiocath (type) catheter was placed into the Left radial artery, secured by Tegaderm   Seldinger technique used.  Events:  patient tolerated procedure well with no complications.      Additional notes:  Sterile technique

## 2024-04-09 NOTE — BRIEF OP NOTE
Date: 2024 - 2024  OR Location: [facility] OR    Name: Radha Holland, : 1975, Age: 48 y.o., MRN: [id number], Sex: female    Diagnosis  Pre-op Diagnosis     * Brain mass [G93.89] Post-op Diagnosis     * Brain mass [G93.89]     Procedures  Right craniotomy for intraventricular mass resection   Use of microdissection  Use of frameless stereotaxy  Placement of right external ventricular drain      Surgeons      * Solo Martinez - Primary    Resident/Fellow/Other Assistant:  Surgeon(s) and Role:     * Ricardo Jordan MD - Resident - Assisting    Procedure Summary  Anesthesia: General  ASA: III  Anesthesia Staff: Anesthesiologist: Solo Rivera MD PhD  C-AA: KEATON Henriquez  Anesthesia Resident: Konstantin Jimenez MD  Estimated Blood Loss: 100mL  Intra-op Medications:   Administrations occurring from 1310 to 1555 on 24:   Medication Name Total Dose   lidocaine-epinephrine PF (Xylocaine W/EPI) 1 %-1:200,000 injection 10 mL   gelatin absorbable (Gelfoam) 100 sponge 1 each   thrombin solution 10,000 Units   bacitracin ointment 1 Application              Anesthesia Record               Intraprocedure I/O Totals          Intake    mannitol 20% 500.00 mL    Propofol Drip 0.00 mL    The total shown is the total volume documented since Anesthesia Start was filed.    Total Intake 500 mL       Output    Urine 800 mL    Est. Blood Loss 150 mL    Total Output 950 mL       Net    Net Volume -450 mL          Specimen:   ID Type Source Tests Collected by Time   1 : RIGHT INTRAVENTRICULAR TUMOR Tissue BRAIN RESECTION SURGICAL PATHOLOGY EXAM Solo Martinez MD 2024 [id number]        Staff:   Circulator: Amber Meyer RN  Relief Circulator: Sara Sharp RN; Tali Schneider RN; Sixto Lambert RN  Relief Scrub: Sotero Downey; Elle Jenkins RN; Ely Moctezuma  Scrub Person: Hanny Lewis          Findings: intraventricular colloid cyst, well decompressed    Complications:  None; patient  tolerated the procedure well.     Disposition: ICU - extubated and stable.  Condition: stable  Specimens Collected:   ID Type Source Tests Collected by Time   1 : RIGHT INTRAVENTRICULAR TUMOR Tissue BRAIN RESECTION SURGICAL PATHOLOGY EXAM Solo Martinez MD 4/9/2024 0657     Attending Attestation: I was present and scrubbed for the entire procedure.    Solo Martinez  Phone Number: [phone]

## 2024-04-09 NOTE — ANESTHESIA POSTPROCEDURE EVALUATION
Patient: Radha Holland    Procedure Summary       Date: 04/09/24 Room / Location: [facility] OR 25 / [facility]    Anesthesia Start: 1326 Anesthesia Stop: 1900    Procedure: R craniotomy, interhemispheric for tumor resection (Right: Head) Diagnosis:       Brain mass      (Brain mass [G93.89])    Surgeons: Solo Martinez MD Responsible Provider: Solo Rivera MD PhD    Anesthesia Type: general ASA Status: 3            Anesthesia Type: general    Vitals Value Taken Time   /111 04/09/24 1859   Temp 36.6 04/09/24 1906   Pulse 97 04/09/24 1905   Resp 15 04/09/24 1905   SpO2 100 % 04/09/24 1905   Vitals shown include unvalidated device data.    Anesthesia Post Evaluation    Patient location during evaluation: ICU  Patient participation: complete - patient participated  Level of consciousness: awake  Pain management: adequate  Airway patency: patent  There was medical reason for not screening for obstructive sleep apnea and/or not using of two or more mitigation strategies.Cardiovascular status: acceptable  Respiratory status: acceptable and face mask  Hydration status: acceptable  Postoperative Nausea and Vomiting: none      There were no known notable events for this encounter.

## 2024-04-09 NOTE — PROGRESS NOTES
[facility]  NEUROSCIENCE INTENSIVE CARE UNIT  RECOVERY NOTE    Patient Name: Radha Holland     MRN: [id number]     Admit Date: 2024     : 1975 AGE: 48 y.o. GENDER: female    Dx: Brain mass  PROCEDURE: Right craniotomy for mass resection with EVD placement     Arrival to NSU: 1900   Report received from anesthesia and neurosurgery.    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     Per chart review, patient experienced acute onset headache approximately 1 year ago and followed by neurosurgery. No MRI obtained. Over last several days, patient stated headache worsened. 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       Interval Events:  s/p R craniotomy for interventricular mass resection      Objective   Vitals 24 hour ranges:  Heart Rate:  [69-86]   Temp:  [35.7 °C (96.3 °F)-36.5 °C (97.7 °F)]   Resp:  [16]   BP: (116-141)/(78-85)   SpO2:  [97 %-99 %]      Intake/Output for last 24 hours:    Intake/Output Summary (Last 24 hours) at 2024  Last data filed at 2024 1859  Gross per 24 hour   Intake 2500 ml   Output 1175 ml   Net 1325 ml        Physical Exam  NEURO:  - Alert, oriented x4, follows commands  - EOS, PERRL, EOMI, VFF  - VELIZ 5/5 strength, no drift, no ataxia  CV:  -  RRR on telemetry, NSR @ 80s  - Arterial line in place  RESP:  - Regular, unlabored  - Oxygen: RA  :  -  Catheter in place w/ clear urine   GI:  -  Abdomen NT/ND, soft  SKIN:  - R craniotomy site intact w/ clean drsg.     Medications  Scheduled: PRN: Continuous:   acetaminophen, 650 mg, oral, q6h  amLODIPine, 10 mg, oral, Daily  [START ON 4/10/2024] ceFAZolin, 2 g, intravenous,  q8h  dexAMETHasone, 4 mg, oral, q6h ELSA  erythromycin, 1 cm, Both Eyes, Nightly  [START ON 4/11/2024] heparin (porcine), 5,000 Units, subcutaneous, q8h  [START ON 4/10/2024] insulin lispro, 0-5 Units, subcutaneous, TID with meals  [Held by provider] losartan, 25 mg, oral, Daily  artificial tears, 1 drop, Both Eyes, 4x daily  [START ON 4/10/2024] pantoprazole, 40 mg, oral, Daily before breakfast   Or  [START ON 4/10/2024] pantoprazole, 40 mg, intravenous, Daily before breakfast  polyethylene glycol, 17 g, oral, Daily  sennosides-docusate sodium, 2 tablet, oral, BID     PRN medications: albuterol, benzocaine-menthol, cyclobenzaprine, dextrose, dextrose, diphenhydrAMINE, glucagon, glucagon, hydrALAZINE, HYDROmorphone, labetaloL, naloxone, ondansetron **OR** ondansetron, oxyCODONE, oxyCODONE, oxyCODONE, promethazine, promethazine **OR** promethazine sodium chloride 0.9%, 100 mL/hr, Last Rate: 100 mL/hr (04/09/24 2025)         Lab Results  Results from last 72 hours   Lab Units 04/09/24  0653 04/08/24  1458   GLUCOSE mg/dL 92 101*   SODIUM mmol/L 134* 134*   POTASSIUM mmol/L 3.8 4.2   CHLORIDE mmol/L 102 99   CO2 mmol/L 25 26   ANION GAP mmol/L 11 13   BUN mg/dL 12 11   CREATININE mg/dL 0.60 0.72   EGFR mL/min/1.73m*2 >90 >90   CALCIUM mg/dL 8.7 9.5   PHOSPHORUS mg/dL 3.4 3.7   ALBUMIN g/dL 3.3* 3.8      Results from last 72 hours   Lab Units 04/09/24  0653 04/08/24  1458   WBC AUTO x10*3/uL 8.0 9.4   NRBC AUTO /100 WBCs 0.0 0.0   RBC AUTO x10*6/uL 4.42 4.74   HEMOGLOBIN g/dL 13.1 13.8   HEMATOCRIT % 39.1 39.6   MCV fL 89 84   MCH pg 29.6 29.1   MCHC g/dL 33.5 34.8   RDW % 13.3 13.2   PLATELETS AUTO x10*3/uL 276 313        Imaging Results  CT head wo IV contrast volumetric surgical planning   Final Result   1. Redemonstration of a well-circumscribed lesion of the superior   aspect of the 3rd ventricle, which can be correlated to the findings   seen on MRI brain 04/05/2024.   2. No evidence of acute cortical infarct or  intracranial hemorrhage.        I personally reviewed the images/study and I agree with the findings   as stated by Jimmy Anguiano MD.        MACRO:   None        Signed by: Cris Linder 4/9/2024 7:29 AM   Dictation workstation:   [id number]      CT chest abdomen pelvis w IV contrast   Final Result   CHEST:   1.  No evidence of primary malignancy/metastatic disease in the chest.        ABDOMEN-PELVIS:   1.  No evidence of primary malignancy/metastatic disease in the   abdomen/pelvis.        I personally reviewed the images/study and I agree with the findings   as stated by Dr. Herminio Love. This study was interpreted at   [place], [place].        MACRO:   None        Signed by: Stevenson Dumont 4/5/2024 5:14 PM   Dictation workstation:   [id number]      XR chest 1 view   Final Result   Bibasilar atelectasis without evidence of acute cardiopulmonary   process.        I personally reviewed the images/study and I agree with Madelyn Paz DO's (radiology resident) findings as stated. This study   was interpreted at [place], [place].        MACRO:   None        Signed by: David Torres 4/5/2024 8:15 AM   Dictation workstation:   [id number]      MR brain w and wo IV contrast   Final Result   MRI Brain:        Isolated, circumscribed T1/T2 hyperintense lesion in the superior   aspect of the 3rd ventricle measuring up to 2.9 x 2.8 x 2.9 cm.   Leading differential consideration is a colloid cyst. A focus of   blooming artifact along the superior margin of the cyst is favored to   reflect dystrophic calcification. Neurocysticercosis could   potentially have a similar imaging appearance. Lack of fat   suppression makes dermoid less likely. No evidence of obstructive   hydrocephalus at this time. No evidence of acute intracranial   hemorrhage.        MRA:        No evidence of source vessel arterial occlusion, aneurysm,  or   vascular malformation.        I personally reviewed the images/study and I agree with the findings   as stated by resident physician Dr. Sebastián Redmond.        MACRO:   None        Signed by: Yuriy Manning 4/5/2024 8:31 AM   Dictation workstation:   [id number]      MR angio head wo IV contrast   Final Result   MRI Brain:        Isolated, circumscribed T1/T2 hyperintense lesion in the superior   aspect of the 3rd ventricle measuring up to 2.9 x 2.8 x 2.9 cm.   Leading differential consideration is a colloid cyst. A focus of   blooming artifact along the superior margin of the cyst is favored to   reflect dystrophic calcification. Neurocysticercosis could   potentially have a similar imaging appearance. Lack of fat   suppression makes dermoid less likely. No evidence of obstructive   hydrocephalus at this time. No evidence of acute intracranial   hemorrhage.        MRA:        No evidence of source vessel arterial occlusion, aneurysm, or   vascular malformation.        I personally reviewed the images/study and I agree with the findings   as stated by resident physician Dr. Sebastián Redmond.        MACRO:   None        Signed by: Yuriy Manning 4/5/2024 8:31 AM   Dictation workstation:   [id number]      CT head wo IV contrast    (Results Pending)         Assessment/Plan   Assessment/Plan:  NEURO:  # 4/9 s/p R craniotomy for interventricular mass resection   Assessment:  - Neurologically intact, exam as above   - EVD at 5  Plan:  - NSU, Q1H neuro checks  - Post-op vitals: Q15min x4, then Q30 min x3, then Q1H  - Pain: acetaminophen, oxycodone  - Nausea: ondansetron, promethazine PRN  - Continue Decadron 4 mg q6h  - PT/OT/SLP    CARDIOVASCULAR:  #HTN  Assessment:  - Home medications: amlodipine 10mg daily, hydrochlorothiazide 12.5mg daily, losartan 25mg daily    Plan:  - Continue to monitor on telemetry  - Continue home medications  - Goal SBP < 160 - Nicardipine, Hydralazine and Labetalol PRN  - Continue Home  Amlodipine 10 mg    RESPIRATORY:  #VERN  Assessment:  - Home medications: albuterol inhaler  - O2 Sat >95% on RA  Plan:  - Continuous pulse oximetry   - O2 PRN to maintain SpO2 > 94%, wean as tolerated  - Albuterol inhaler PRN  - Incentive spirometry while awake     RENAL/:  No active issues  Assessment:  - Baseline BUN/Cr: 11/0.7  Results from last 72 hours   Lab Units 24  0653 24  1458   BUN mg/dL 12 11   CREATININE mg/dL 0.60 0.72       Plan:  - Monitor with daily RFP  - Maintain iniguez catheter for strict I/Os    FEN/GI:  #Hyponatremia   Assessment:  -  Abd soft, NT  - Na trending up 132 -> 134 ->134  Plan:  - Monitor and replace electrolytes per protocol  - IVF: NS @ 100 mL/hour  - Diet: NPO, nursing to perform swallow evaluation  - Bowel Regimen: Miralax, Laurence-Colace  - Maintain K >4, Mg >2    ENDOCRINE:  No active issues   Assessment:  Results from last 7 days   Lab Units 24  19424  0653 24  1458   POCT GLUCOSE mg/dL 146*  --   --    GLUCOSE mg/dL  --  92 101*      Plan:  - Accuchecks & ISS Q6H    HEMATOLOGY:  No active issues   Assessment:  - Baseline Hgb: 12.6  - Baseline Plts: 37.3  Results from last 7 days   Lab Units 24  0653 24  1458   HEMOGLOBIN g/dL 13.1 13.8   HEMATOCRIT % 39.1 39.6   PLATELETS AUTO x10*3/uL 276 313   Plan:  - Continue to monitor with daily CBC and Coag panel    INFECTIOUS DISEASE:  Assessment:  Results from last 7 days   Lab Units 24  0653 24  1458   WBC AUTO x10*3/uL 8.0 9.4     - Temp (24hrs), Av.1 °C (97 °F), Min:35.7 °C (96.3 °F), Max:36.5 °C (97.7 °F)      Plan:  - Continue to monitor for s/sx of infection  - Pan culture for temperature > 38.4 C  - Continue perioperative Ancef     MUSCULOSKELETAL:  # Arthritis   - PT/OT, OOB when medically appropriate   SKIN:  - No acute issues  - Turns and skin care per NSU protocol    ACCESS:  - PIVs    PROPHYLAXIS:  - DVT/VTE: SCDs, SQ heparin   - GI: IV PPI    RESTRAINTS:  Not  indicated/Patient does not meet criteria for restraints    Kasia CONNER Zhang APRN-CNP  Neuroscience Intensive Care       Total critical care time of 60 minutes, with > 50% of time spent in direct contact with patient/family for education, counseling and coordination of care.

## 2024-04-09 NOTE — ANESTHESIA PROCEDURE NOTES
Peripheral IV  Date/Time: 4/9/2024 2:00 PM      Placement  Needle size: 16 G  Laterality: left  Location: wrist  Local anesthetic: none  Site prep: alcohol  Technique: anatomical landmarks  Attempts: 1

## 2024-04-09 NOTE — ANESTHESIA PROCEDURE NOTES
Airway  Date/Time: 4/9/2024 1:37 PM  Urgency: elective    Airway not difficult    Staffing  Performed: attending and resident   Authorized by: Solo Rivera MD PhD    Performed by: Konstantin Jimenez MD  Patient location during procedure: OR    Indications and Patient Condition  Indications for airway management: anesthesia  Spontaneous Ventilation: absent  Sedation level: deep  Preoxygenated: yes  Patient position: sniffing  Mask difficulty assessment: 1 - vent by mask  Planned trial extubation    Final Airway Details  Final airway type: endotracheal airway      Successful airway: ETT     Successful intubation technique: direct laryngoscopy  Facilitating devices/methods: intubating stylet  Endotracheal tube insertion site: oral  Blade: Nato  Blade size: #3  ETT size (mm): 7.0  Cormack-Lehane Classification: grade IIb - view of arytenoids or posterior of glottis only  Placement verified by: capnometry   Measured from: gums  ETT to gums (cm): 21  Number of attempts at approach: 2  Ventilation between attempts: BVM    Additional Comments  Airway secured by attending on 2nd attempt. Patient ventilated inbetween attempts. Hemodynamically stable.

## 2024-04-10 ENCOUNTER — APPOINTMENT (OUTPATIENT)
Dept: CARDIOLOGY | Facility: HOSPITAL | Age: 49
End: 2024-04-10
Payer: COMMERCIAL

## 2024-04-10 LAB
ALBUMIN SERPL BCP-MCNC: 3.5 G/DL (ref 3.4–5)
ANION GAP SERPL CALC-SCNC: 10 MMOL/L (ref 10–20)
ATRIAL RATE: 96 BPM
BASOPHILS # BLD AUTO: 0.01 X10*3/UL (ref 0–0.1)
BASOPHILS NFR BLD AUTO: 0.1 %
BUN SERPL-MCNC: 7 MG/DL (ref 6–23)
CA-I BLD-SCNC: 1.05 MMOL/L (ref 1.1–1.33)
CALCIUM SERPL-MCNC: 8.5 MG/DL (ref 8.6–10.6)
CHLORIDE SERPL-SCNC: 100 MMOL/L (ref 98–107)
CO2 SERPL-SCNC: 25 MMOL/L (ref 21–32)
CREAT SERPL-MCNC: 0.65 MG/DL (ref 0.5–1.05)
EGFRCR SERPLBLD CKD-EPI 2021: >90 ML/MIN/1.73M*2
EOSINOPHIL # BLD AUTO: 0 X10*3/UL (ref 0–0.7)
EOSINOPHIL NFR BLD AUTO: 0 %
ERYTHROCYTE [DISTWIDTH] IN BLOOD BY AUTOMATED COUNT: 12.9 % (ref 11.5–14.5)
GLUCOSE BLD MANUAL STRIP-MCNC: 155 MG/DL (ref 74–99)
GLUCOSE BLD MANUAL STRIP-MCNC: 156 MG/DL (ref 74–99)
GLUCOSE BLD MANUAL STRIP-MCNC: 163 MG/DL (ref 74–99)
GLUCOSE BLD MANUAL STRIP-MCNC: 177 MG/DL (ref 74–99)
GLUCOSE SERPL-MCNC: 147 MG/DL (ref 74–99)
HCT VFR BLD AUTO: 34.3 % (ref 36–46)
HGB BLD-MCNC: 11.8 G/DL (ref 12–16)
IMM GRANULOCYTES # BLD AUTO: 0.06 X10*3/UL (ref 0–0.7)
IMM GRANULOCYTES NFR BLD AUTO: 0.4 % (ref 0–0.9)
LYMPHOCYTES # BLD AUTO: 0.64 X10*3/UL (ref 1.2–4.8)
LYMPHOCYTES NFR BLD AUTO: 4.4 %
MAGNESIUM SERPL-MCNC: 1.58 MG/DL (ref 1.6–2.4)
MCH RBC QN AUTO: 28.9 PG (ref 26–34)
MCHC RBC AUTO-ENTMCNC: 34.4 G/DL (ref 32–36)
MCV RBC AUTO: 84 FL (ref 80–100)
MONOCYTES # BLD AUTO: 0.46 X10*3/UL (ref 0.1–1)
MONOCYTES NFR BLD AUTO: 3.2 %
NEUTROPHILS # BLD AUTO: 13.32 X10*3/UL (ref 1.2–7.7)
NEUTROPHILS NFR BLD AUTO: 91.9 %
NRBC BLD-RTO: 0 /100 WBCS (ref 0–0)
P AXIS: 65 DEGREES
P OFFSET: 208 MS
P ONSET: 153 MS
PHOSPHATE SERPL-MCNC: 2.4 MG/DL (ref 2.5–4.9)
PLATELET # BLD AUTO: 280 X10*3/UL (ref 150–450)
POTASSIUM SERPL-SCNC: 3.9 MMOL/L (ref 3.5–5.3)
PR INTERVAL: 132 MS
Q ONSET: 219 MS
QRS COUNT: 16 BEATS
QRS DURATION: 76 MS
QT INTERVAL: 408 MS
QTC CALCULATION(BAZETT): 515 MS
QTC FREDERICIA: 477 MS
R AXIS: 43 DEGREES
RBC # BLD AUTO: 4.09 X10*6/UL (ref 4–5.2)
SODIUM SERPL-SCNC: 131 MMOL/L (ref 136–145)
T AXIS: 35 DEGREES
T OFFSET: 423 MS
VENTRICULAR RATE: 96 BPM
WBC # BLD AUTO: 14.5 X10*3/UL (ref 4.4–11.3)

## 2024-04-10 PROCEDURE — 2500000004 HC RX 250 GENERAL PHARMACY W/ HCPCS (ALT 636 FOR OP/ED)

## 2024-04-10 PROCEDURE — 2500000001 HC RX 250 WO HCPCS SELF ADMINISTERED DRUGS (ALT 637 FOR MEDICARE OP): Performed by: STUDENT IN AN ORGANIZED HEALTH CARE EDUCATION/TRAINING PROGRAM

## 2024-04-10 PROCEDURE — 2500000004 HC RX 250 GENERAL PHARMACY W/ HCPCS (ALT 636 FOR OP/ED): Mod: JZ | Performed by: REGISTERED NURSE

## 2024-04-10 PROCEDURE — 2020000001 HC ICU ROOM DAILY

## 2024-04-10 PROCEDURE — 2500000005 HC RX 250 GENERAL PHARMACY W/O HCPCS: Performed by: STUDENT IN AN ORGANIZED HEALTH CARE EDUCATION/TRAINING PROGRAM

## 2024-04-10 PROCEDURE — 82330 ASSAY OF CALCIUM: CPT | Performed by: REGISTERED NURSE

## 2024-04-10 PROCEDURE — 2500000002 HC RX 250 W HCPCS SELF ADMINISTERED DRUGS (ALT 637 FOR MEDICARE OP, ALT 636 FOR OP/ED): Performed by: STUDENT IN AN ORGANIZED HEALTH CARE EDUCATION/TRAINING PROGRAM

## 2024-04-10 PROCEDURE — 85025 COMPLETE CBC W/AUTO DIFF WBC: CPT | Performed by: REGISTERED NURSE

## 2024-04-10 PROCEDURE — 99291 CRITICAL CARE FIRST HOUR: CPT

## 2024-04-10 PROCEDURE — 61781 SCAN PROC CRANIAL INTRA: CPT | Performed by: NEUROLOGICAL SURGERY

## 2024-04-10 PROCEDURE — 97165 OT EVAL LOW COMPLEX 30 MIN: CPT | Mod: GO

## 2024-04-10 PROCEDURE — 97161 PT EVAL LOW COMPLEX 20 MIN: CPT | Mod: GP

## 2024-04-10 PROCEDURE — 37799 UNLISTED PX VASCULAR SURGERY: CPT | Performed by: REGISTERED NURSE

## 2024-04-10 PROCEDURE — 2500000004 HC RX 250 GENERAL PHARMACY W/ HCPCS (ALT 636 FOR OP/ED): Performed by: STUDENT IN AN ORGANIZED HEALTH CARE EDUCATION/TRAINING PROGRAM

## 2024-04-10 PROCEDURE — 82947 ASSAY GLUCOSE BLOOD QUANT: CPT

## 2024-04-10 PROCEDURE — 61510 CRNEC TREPH EXC BRN TUM STTL: CPT | Performed by: NEUROLOGICAL SURGERY

## 2024-04-10 PROCEDURE — 83735 ASSAY OF MAGNESIUM: CPT | Performed by: REGISTERED NURSE

## 2024-04-10 PROCEDURE — 93010 ELECTROCARDIOGRAM REPORT: CPT | Performed by: INTERNAL MEDICINE

## 2024-04-10 PROCEDURE — 97530 THERAPEUTIC ACTIVITIES: CPT | Mod: GO

## 2024-04-10 PROCEDURE — 80069 RENAL FUNCTION PANEL: CPT | Performed by: REGISTERED NURSE

## 2024-04-10 PROCEDURE — 93005 ELECTROCARDIOGRAM TRACING: CPT

## 2024-04-10 PROCEDURE — C9113 INJ PANTOPRAZOLE SODIUM, VIA: HCPCS | Performed by: STUDENT IN AN ORGANIZED HEALTH CARE EDUCATION/TRAINING PROGRAM

## 2024-04-10 RX ORDER — CALCIUM GLUCONATE 20 MG/ML
1 INJECTION, SOLUTION INTRAVENOUS EVERY 6 HOURS PRN
Status: DISCONTINUED | OUTPATIENT
Start: 2024-04-10 | End: 2024-04-17 | Stop reason: HOSPADM

## 2024-04-10 RX ORDER — POTASSIUM CHLORIDE 14.9 MG/ML
20 INJECTION INTRAVENOUS EVERY 6 HOURS PRN
Status: DISCONTINUED | OUTPATIENT
Start: 2024-04-10 | End: 2024-04-17 | Stop reason: HOSPADM

## 2024-04-10 RX ORDER — SCOLOPAMINE TRANSDERMAL SYSTEM 1 MG/1
1 PATCH, EXTENDED RELEASE TRANSDERMAL
Status: DISCONTINUED | OUTPATIENT
Start: 2024-04-10 | End: 2024-04-17 | Stop reason: HOSPADM

## 2024-04-10 RX ORDER — POTASSIUM CHLORIDE 20 MEQ/1
40 TABLET, EXTENDED RELEASE ORAL EVERY 6 HOURS PRN
Status: DISCONTINUED | OUTPATIENT
Start: 2024-04-10 | End: 2024-04-17 | Stop reason: HOSPADM

## 2024-04-10 RX ORDER — MAGNESIUM SULFATE HEPTAHYDRATE 40 MG/ML
2 INJECTION, SOLUTION INTRAVENOUS EVERY 6 HOURS PRN
Status: DISCONTINUED | OUTPATIENT
Start: 2024-04-10 | End: 2024-04-17 | Stop reason: HOSPADM

## 2024-04-10 RX ORDER — MAGNESIUM SULFATE HEPTAHYDRATE 40 MG/ML
4 INJECTION, SOLUTION INTRAVENOUS EVERY 6 HOURS PRN
Status: DISCONTINUED | OUTPATIENT
Start: 2024-04-10 | End: 2024-04-17 | Stop reason: HOSPADM

## 2024-04-10 RX ORDER — POTASSIUM CHLORIDE 20 MEQ/1
20 TABLET, EXTENDED RELEASE ORAL EVERY 6 HOURS PRN
Status: DISCONTINUED | OUTPATIENT
Start: 2024-04-10 | End: 2024-04-17 | Stop reason: HOSPADM

## 2024-04-10 RX ORDER — POTASSIUM CHLORIDE 1.5 G/1.58G
40 POWDER, FOR SOLUTION ORAL EVERY 6 HOURS PRN
Status: DISCONTINUED | OUTPATIENT
Start: 2024-04-10 | End: 2024-04-17 | Stop reason: HOSPADM

## 2024-04-10 RX ORDER — HEPARIN SODIUM 5000 [USP'U]/ML
5000 INJECTION, SOLUTION INTRAVENOUS; SUBCUTANEOUS EVERY 8 HOURS
Status: DISCONTINUED | OUTPATIENT
Start: 2024-04-10 | End: 2024-04-15

## 2024-04-10 RX ORDER — CALCIUM GLUCONATE 20 MG/ML
2 INJECTION, SOLUTION INTRAVENOUS EVERY 6 HOURS PRN
Status: DISCONTINUED | OUTPATIENT
Start: 2024-04-10 | End: 2024-04-17 | Stop reason: HOSPADM

## 2024-04-10 RX ORDER — POTASSIUM CHLORIDE 1.5 G/1.58G
20 POWDER, FOR SOLUTION ORAL EVERY 6 HOURS PRN
Status: DISCONTINUED | OUTPATIENT
Start: 2024-04-10 | End: 2024-04-17 | Stop reason: HOSPADM

## 2024-04-10 RX ADMIN — ACETAMINOPHEN 650 MG: 325 TABLET ORAL at 17:08

## 2024-04-10 RX ADMIN — OXYCODONE HYDROCHLORIDE 5 MG: 5 TABLET ORAL at 22:26

## 2024-04-10 RX ADMIN — CARBOXYMETHYLCELLULOSE SODIUM 1 DROP: 5 SOLUTION/ DROPS OPHTHALMIC at 06:08

## 2024-04-10 RX ADMIN — INSULIN LISPRO 1 UNITS: 100 INJECTION, SOLUTION INTRAVENOUS; SUBCUTANEOUS at 17:15

## 2024-04-10 RX ADMIN — ACETAMINOPHEN 650 MG: 325 TABLET ORAL at 11:48

## 2024-04-10 RX ADMIN — SENNOSIDES AND DOCUSATE SODIUM 2 TABLET: 8.6; 5 TABLET ORAL at 20:46

## 2024-04-10 RX ADMIN — LABETALOL HYDROCHLORIDE 10 MG: 5 INJECTION, SOLUTION INTRAVENOUS at 00:51

## 2024-04-10 RX ADMIN — HEPARIN SODIUM 5000 UNITS: 5000 INJECTION INTRAVENOUS; SUBCUTANEOUS at 22:26

## 2024-04-10 RX ADMIN — DEXAMETHASONE SODIUM PHOSPHATE 4 MG: 4 INJECTION INTRA-ARTICULAR; INTRALESIONAL; INTRAMUSCULAR; INTRAVENOUS; SOFT TISSUE at 06:07

## 2024-04-10 RX ADMIN — PANTOPRAZOLE SODIUM 40 MG: 40 INJECTION, POWDER, FOR SOLUTION INTRAVENOUS at 06:07

## 2024-04-10 RX ADMIN — HYDROMORPHONE HYDROCHLORIDE 0.2 MG: 1 INJECTION, SOLUTION INTRAMUSCULAR; INTRAVENOUS; SUBCUTANEOUS at 01:37

## 2024-04-10 RX ADMIN — DEXAMETHASONE SODIUM PHOSPHATE 4 MG: 4 INJECTION, SOLUTION INTRAMUSCULAR; INTRAVENOUS at 23:41

## 2024-04-10 RX ADMIN — INSULIN LISPRO 1 UNITS: 100 INJECTION, SOLUTION INTRAVENOUS; SUBCUTANEOUS at 08:13

## 2024-04-10 RX ADMIN — SODIUM CHLORIDE 100 ML/HR: 9 INJECTION, SOLUTION INTRAVENOUS at 17:08

## 2024-04-10 RX ADMIN — POLYETHYLENE GLYCOL 3350 17 G: 17 POWDER, FOR SOLUTION ORAL at 08:12

## 2024-04-10 RX ADMIN — SENNOSIDES AND DOCUSATE SODIUM 2 TABLET: 8.6; 5 TABLET ORAL at 08:12

## 2024-04-10 RX ADMIN — DEXAMETHASONE SODIUM PHOSPHATE 4 MG: 4 INJECTION, SOLUTION INTRAMUSCULAR; INTRAVENOUS at 15:11

## 2024-04-10 RX ADMIN — DEXAMETHASONE SODIUM PHOSPHATE 4 MG: 4 INJECTION INTRA-ARTICULAR; INTRALESIONAL; INTRAMUSCULAR; INTRAVENOUS; SOFT TISSUE at 00:47

## 2024-04-10 RX ADMIN — POTASSIUM CHLORIDE 20 MEQ: 14.9 INJECTION, SOLUTION INTRAVENOUS at 04:41

## 2024-04-10 RX ADMIN — CALCIUM GLUCONATE 1 G: 20 INJECTION, SOLUTION INTRAVENOUS at 04:40

## 2024-04-10 RX ADMIN — CEFAZOLIN SODIUM 2 G: 2 INJECTION, SOLUTION INTRAVENOUS at 10:39

## 2024-04-10 RX ADMIN — INSULIN LISPRO 1 UNITS: 100 INJECTION, SOLUTION INTRAVENOUS; SUBCUTANEOUS at 11:47

## 2024-04-10 RX ADMIN — PROMETHAZINE HYDROCHLORIDE 12.5 MG: 25 INJECTION INTRAMUSCULAR; INTRAVENOUS at 11:47

## 2024-04-10 RX ADMIN — OXYCODONE HYDROCHLORIDE 10 MG: 5 TABLET ORAL at 15:11

## 2024-04-10 RX ADMIN — CARBOXYMETHYLCELLULOSE SODIUM 1 DROP: 5 SOLUTION/ DROPS OPHTHALMIC at 20:46

## 2024-04-10 RX ADMIN — HYDROMORPHONE HYDROCHLORIDE 0.2 MG: 1 INJECTION, SOLUTION INTRAMUSCULAR; INTRAVENOUS; SUBCUTANEOUS at 10:37

## 2024-04-10 RX ADMIN — CEFAZOLIN SODIUM 2 G: 2 INJECTION, SOLUTION INTRAVENOUS at 17:08

## 2024-04-10 RX ADMIN — OXYCODONE HYDROCHLORIDE 10 MG: 5 TABLET ORAL at 08:13

## 2024-04-10 RX ADMIN — HYDROMORPHONE HYDROCHLORIDE 0.2 MG: 1 INJECTION, SOLUTION INTRAMUSCULAR; INTRAVENOUS; SUBCUTANEOUS at 06:43

## 2024-04-10 RX ADMIN — CEFAZOLIN SODIUM 2 G: 2 INJECTION, SOLUTION INTRAVENOUS at 03:10

## 2024-04-10 RX ADMIN — ONDANSETRON 4 MG: 2 INJECTION INTRAMUSCULAR; INTRAVENOUS at 04:19

## 2024-04-10 RX ADMIN — PROMETHAZINE HYDROCHLORIDE 6.25 MG: 25 INJECTION INTRAMUSCULAR; INTRAVENOUS at 01:37

## 2024-04-10 RX ADMIN — CARBOXYMETHYLCELLULOSE SODIUM 1 DROP: 5 SOLUTION/ DROPS OPHTHALMIC at 11:52

## 2024-04-10 RX ADMIN — AMLODIPINE BESYLATE 10 MG: 10 TABLET ORAL at 08:12

## 2024-04-10 RX ADMIN — SODIUM CHLORIDE 100 ML/HR: 9 INJECTION, SOLUTION INTRAVENOUS at 06:47

## 2024-04-10 RX ADMIN — ERYTHROMYCIN 1 CM: 5 OINTMENT OPHTHALMIC at 20:46

## 2024-04-10 RX ADMIN — SCOPOLAMINE 1 PATCH: 1.5 PATCH, EXTENDED RELEASE TRANSDERMAL at 03:10

## 2024-04-10 RX ADMIN — CARBOXYMETHYLCELLULOSE SODIUM 1 DROP: 5 SOLUTION/ DROPS OPHTHALMIC at 17:08

## 2024-04-10 RX ADMIN — PROMETHAZINE HYDROCHLORIDE 12.5 MG: 25 INJECTION INTRAMUSCULAR; INTRAVENOUS at 05:12

## 2024-04-10 RX ADMIN — MAGNESIUM SULFATE HEPTAHYDRATE 4 G: 40 INJECTION, SOLUTION INTRAVENOUS at 04:40

## 2024-04-10 ASSESSMENT — PAIN SCALES - GENERAL
PAINLEVEL_OUTOF10: 10 - WORST POSSIBLE PAIN
PAINLEVEL_OUTOF10: 0 - NO PAIN
PAINLEVEL_OUTOF10: 2
PAINLEVEL_OUTOF10: 10 - WORST POSSIBLE PAIN
PAINLEVEL_OUTOF10: 10 - WORST POSSIBLE PAIN
PAINLEVEL_OUTOF10: 3
PAINLEVEL_OUTOF10: 10 - WORST POSSIBLE PAIN
PAINLEVEL_OUTOF10: 6
PAINLEVEL_OUTOF10: 10 - WORST POSSIBLE PAIN
PAINLEVEL_OUTOF10: 7

## 2024-04-10 ASSESSMENT — COGNITIVE AND FUNCTIONAL STATUS - GENERAL
TOILETING: A LITTLE
TURNING FROM BACK TO SIDE WHILE IN FLAT BAD: A LITTLE
DAILY ACTIVITIY SCORE: 21
MOBILITY SCORE: 19
CLIMB 3 TO 5 STEPS WITH RAILING: A LITTLE
DRESSING REGULAR UPPER BODY CLOTHING: A LITTLE
HELP NEEDED FOR BATHING: A LITTLE
HELP NEEDED FOR BATHING: A LITTLE
DAILY ACTIVITIY SCORE: 20
DRESSING REGULAR LOWER BODY CLOTHING: A LITTLE
MOVING TO AND FROM BED TO CHAIR: A LITTLE
MOBILITY SCORE: 20
STANDING UP FROM CHAIR USING ARMS: A LITTLE
WALKING IN HOSPITAL ROOM: A LITTLE
MOVING TO AND FROM BED TO CHAIR: A LITTLE
CLIMB 3 TO 5 STEPS WITH RAILING: A LITTLE
DRESSING REGULAR LOWER BODY CLOTHING: A LITTLE
STANDING UP FROM CHAIR USING ARMS: A LITTLE
WALKING IN HOSPITAL ROOM: A LITTLE
TOILETING: A LITTLE

## 2024-04-10 ASSESSMENT — PAIN - FUNCTIONAL ASSESSMENT
PAIN_FUNCTIONAL_ASSESSMENT: 0-10

## 2024-04-10 ASSESSMENT — PAIN DESCRIPTION - DESCRIPTORS
DESCRIPTORS: ACHING;THROBBING;TIGHTNESS
DESCRIPTORS: ACHING
DESCRIPTORS: HEADACHE

## 2024-04-10 ASSESSMENT — ACTIVITIES OF DAILY LIVING (ADL)
BATHING_ASSISTANCE: MINIMAL
ADL_ASSISTANCE: INDEPENDENT

## 2024-04-10 ASSESSMENT — PAIN DESCRIPTION - LOCATION: LOCATION: HEAD

## 2024-04-10 NOTE — CARE PLAN
Problem: Pain - Adult  Goal: Verbalizes/displays adequate comfort level or baseline comfort level  Outcome: Progressing     Problem: Safety - Adult  Goal: Free from fall injury  Outcome: Progressing     Problem: Chronic Conditions and Co-morbidities  Goal: Patient's chronic conditions and co-morbidity symptoms are monitored and maintained or improved  Outcome: Progressing     Problem: Skin  Goal: Prevent/minimize sheer/friction injuries  Outcome: Progressing  Goal: Promote skin healing  Outcome: Progressing

## 2024-04-10 NOTE — PROGRESS NOTES
[facility]  NEUROSCIENCE INTENSIVE CARE UNIT  DAILY PROGRESS NOTE    Patient Name: Radha Holland     MRN: [id number]     Admit Date: 2024     : 1975 AGE: 48 y.o. GENDER: female      Subjective   Patient reports incisional pain, headache, and persistent nausea minimally improved with PO pain and antiemetics       Objective   Vitals 24 hour ranges:  Heart Rate:  []   Temp:  [35.9 °C (96.6 °F)-36.7 °C (98.1 °F)]   Resp:  [11-27]   BP: (119-144)/(72-88)   Weight:  [102 kg (224 lb 6.9 oz)]   SpO2:  [98 %-100 %]    Intake/Output for last 24 hours:    Intake/Output Summary (Last 24 hours) at 4/10/2024 0628  Last data filed at 4/10/2024 0600  Gross per 24 hour   Intake 3952.66 ml   Output 2884 ml   Net 1068.66 ml     Physical Exam:  NEURO:  - Alert, oriented x4, follows commands  - Following commands, 5/5 strength x 4 extremities  CV:  - RRR on telemetry, NSR  - L radial dmitry w good waveform  RESP:  - Regular, unlabored on room air  :  - NO iniguez catheter in place  GI:  - Abdomen NT/ND, soft  SKIN:  - cranial incision covered with bandage some bloody strike through    Medications  Scheduled: PRN: Continuous:   acetaminophen, 650 mg, oral, q6h  amLODIPine, 10 mg, oral, Daily  ceFAZolin, 2 g, intravenous, q8h  dexAMETHasone, 4 mg, intravenous, q6h  erythromycin, 1 cm, Both Eyes, Nightly  [START ON 2024] heparin (porcine), 5,000 Units, subcutaneous, q8h  insulin lispro, 0-5 Units, subcutaneous, TID with meals  [Held by provider] losartan, 25 mg, oral, Daily  artificial tears, 1 drop, Both Eyes, 4x daily  pantoprazole, 40 mg, oral, Daily before breakfast   Or  pantoprazole, 40 mg, intravenous, Daily before breakfast  polyethylene glycol, 17 g, oral, Daily  scopolamine, 1 patch, transdermal, q72h  sennosides-docusate sodium, 2 tablet, oral, BID     PRN medications: albuterol, benzocaine-menthol, calcium gluconate, calcium gluconate, cyclobenzaprine, dextrose, dextrose,  diphenhydrAMINE, glucagon, glucagon, hydrALAZINE, HYDROmorphone, labetaloL, magnesium sulfate, magnesium sulfate, naloxone, ondansetron **OR** ondansetron, oxyCODONE, oxyCODONE, oxyCODONE, potassium chloride CR **OR** potassium chloride, potassium chloride CR **OR** potassium chloride, potassium chloride, promethazine, promethazine **OR** promethazine sodium chloride 0.9%, 100 mL/hr, Last Rate: 100 mL/hr (04/09/24 2025)         Lab Results  Lab Results   Component Value Date    WBC 14.5 (H) 04/10/2024    HGB 11.8 (L) 04/10/2024    HCT 34.3 (L) 04/10/2024    MCV 84 04/10/2024     04/10/2024     Lab Results   Component Value Date    GLUCOSE 147 (H) 04/10/2024    CALCIUM 8.5 (L) 04/10/2024     (L) 04/10/2024    K 3.9 04/10/2024    CO2 25 04/10/2024     04/10/2024    BUN 7 04/10/2024    CREATININE 0.65 04/10/2024       Imaging Results  CT head wo IV contrast         CT head wo IV contrast volumetric surgical planning   Final Result   1. Redemonstration of a well-circumscribed lesion of the superior   aspect of the 3rd ventricle, which can be correlated to the findings   seen on MRI brain 04/05/2024.   2. No evidence of acute cortical infarct or intracranial hemorrhage.        I personally reviewed the images/study and I agree with the findings   as stated by Jimmy Anguiano MD.        MACRO:   None        Signed by: Cris Linder 4/9/2024 7:29 AM   Dictation workstation:   [id number]      CT chest abdomen pelvis w IV contrast   Final Result   CHEST:   1.  No evidence of primary malignancy/metastatic disease in the chest.        ABDOMEN-PELVIS:   1.  No evidence of primary malignancy/metastatic disease in the   abdomen/pelvis.        I personally reviewed the images/study and I agree with the findings   as stated by Dr. Herminio Love. This study was interpreted at   [place], [place].        MACRO:   None        Signed by: Stevenson Dumont 4/5/2024 5:14 PM    Dictation workstation:   [id number]      XR chest 1 view   Final Result   Bibasilar atelectasis without evidence of acute cardiopulmonary   process.        I personally reviewed the images/study and I agree with Madelyn Paz DO's (radiology resident) findings as stated. This study   was interpreted at [facility],   [place], [place].        MACRO:   None        Signed by: David Torres 4/5/2024 8:15 AM   Dictation workstation:   [id number]      MR brain w and wo IV contrast   Final Result   MRI Brain:        Isolated, circumscribed T1/T2 hyperintense lesion in the superior   aspect of the 3rd ventricle measuring up to 2.9 x 2.8 x 2.9 cm.   Leading differential consideration is a colloid cyst. A focus of   blooming artifact along the superior margin of the cyst is favored to   reflect dystrophic calcification. Neurocysticercosis could   potentially have a similar imaging appearance. Lack of fat   suppression makes dermoid less likely. No evidence of obstructive   hydrocephalus at this time. No evidence of acute intracranial   hemorrhage.        MRA:        No evidence of source vessel arterial occlusion, aneurysm, or   vascular malformation.        I personally reviewed the images/study and I agree with the findings   as stated by resident physician Dr. Sebastián Redmond.        MACRO:   None        Signed by: Yuriy Manning 4/5/2024 8:31 AM   Dictation workstation:   [id number]      MR angio head wo IV contrast   Final Result   MRI Brain:        Isolated, circumscribed T1/T2 hyperintense lesion in the superior   aspect of the 3rd ventricle measuring up to 2.9 x 2.8 x 2.9 cm.   Leading differential consideration is a colloid cyst. A focus of   blooming artifact along the superior margin of the cyst is favored to   reflect dystrophic calcification. Neurocysticercosis could   potentially have a similar imaging appearance. Lack of fat   suppression makes dermoid less likely. No  evidence of obstructive   hydrocephalus at this time. No evidence of acute intracranial   hemorrhage.        MRA:        No evidence of source vessel arterial occlusion, aneurysm, or   vascular malformation.        I personally reviewed the images/study and I agree with the findings   as stated by resident physician Dr. Sebastián Redmond.        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, EVD placement  Assessment:  - Neurologically stable POD 1  - EVD at 10; 24h output 59  Plan:  - NSU  - Q1H neuro checks  - Con't EVD at 10 until 4/12 per neurosurgery  - Con't ancef while EVD in place  - Dex decreased to 4mg q8 with plan to taper off over 1 week  - Pain: acetaminophen, oxycodone, hydromorphone PRN  - Nausea: ondansetron, phenergan, scopolamine patch  - PT/OT    CARDIOVASCULAR:  #HTN  Assessment:  - Stable   Plan:  - Continue to monitor on telemetry  - On home amlodipine 10mg daily, held losartan 25mg  - Goal SBP < 160 - Nicardipine, Hydralazine and Labetalol PRN    RESPIRATORY:  #VERN on CPAP  Assessment:  - Stable on room air   Plan:  - Continuous pulse oximetry   - O2 PRN to maintain SpO2 > 94%, wean as tolerated  - Incentive spirometry while awake     RENAL/:  #No active issues  Assessment:  Results from last 72 hours   Lab Units 04/10/24  0317 04/09/24  0653   BUN mg/dL 7 12   CREATININE mg/dL 0.65 0.60       Plan:  - Monitor with daily RFP    FEN/GI:  #Hyponatremia  Assessment:  - Last BM Date:  (PTA)  - Persistent nausea on multiple anti-emetics w mild improvement  Plan:  - Monitor and replace electrolytes per protocol  - Daily RFP  - IVF: NS 100cc/h with reduced PO intake and persistent nausea will wean as able  - Diet: regular  - Bowel Regimen: Docusate-Senna, Miralax    ENDOCRINE:  #No active issues  Assessment:  Results from last 7 days   Lab Units  04/10/24  0317 04/09/24  1940 04/09/24  0653   POCT GLUCOSE mg/dL  --  146*  --    GLUCOSE mg/dL 147*  --  92      Plan:  - On dex, PPI, SSI     HEMATOLOGY:  #anemia  Assessment:  - Baseline Hgb: 13.1  - Baseline Plts: 276  - acute decline in hemoglobin likely related to post-op surgery  Results from last 7 days   Lab Units 04/10/24  0317 04/09/24  0653   HEMOGLOBIN g/dL 11.8* 13.1   HEMATOCRIT % 34.3* 39.1   PLATELETS AUTO x10*3/uL 280 276     Plan:  - Continue to monitor with daily CBC    INFECTIOUS DISEASE:  #Leukocytosis  Assessment:  Afebrile, no active s/sx of infection, likely 2/2 operation and dex  Results from last 7 days   Lab Units 04/10/24  0317 04/09/24  0653   WBC AUTO x10*3/uL 14.5* 8.0     - Temp (24hrs), Av.2 °C (97.2 °F), Min:35.9 °C (96.6 °F), Max:36.7 °C (98.1 °F)  Plan:  - Continue to monitor for s/sx of infection  - Pan culture for temperature > 38.4 C    MUSCULOSKELETAL:  - No acute issues    SKIN:  - EVD site c/d/I   - Turns and skin care per NSU protocol    ACCESS:  - PIV x 3    PROPHYLAXIS:  - DVT/VTE: SCDs, SQ heparin POD2  - GI: Protonix    RESTRAINTS:  Not indicated/Patient does not meet criteria for restraints    Chino Dueñas MD  Neuroscience Intensive Care    Plan of care to be discussed with neurocritical care attending

## 2024-04-10 NOTE — CARE PLAN
Interprofessional Rounds    Summary:  Mass resection. EVD in place.  Up in chair. Patient is her own decision maker.     Participants: Advance Practice Provider, Ethicist, Occupational Therapist, Physical Therapist, Physician, RN, and     Care Plan Reviewed with:  Interdisciplinary Team

## 2024-04-10 NOTE — PROGRESS NOTES
Occupational Therapy    Evaluation and Treatment    Patient Name: Radha Holland  MRN: [id number]  Today's Date: 4/10/2024  Time Calculation  Start Time: 0912  Stop Time: 0944  Time Calculation (min): 32 min    Assessment  IP OT Assessment  OT Assessment: Radha is a 47yo female presenting with deficits in memory, ADLs, IADLs, transfers and functional activity tolerance. Pt would continue to benefit from skilled OT intervention to return to PLOF  Prognosis: Good  Barriers to Discharge: None  Evaluation/Treatment Tolerance: Patient tolerated treatment well  Medical Staff Made Aware: Yes  End of Session Communication: Bedside nurse  End of Session Patient Position: Up in chair, Alarm off, caregiver present  Plan:  Treatment Interventions: ADL retraining, Functional transfer training, UE strengthening/ROM, Endurance training, Patient/family training, Compensatory technique education  OT Frequency: 2 times per week  OT Discharge Recommendations: Low intensity level of continued care  OT Recommended Transfer Status:  (CGA)  OT - OK to Discharge: Yes    Subjective   Current Problem:  1. Brain mass  Case Request Operating Room: R craniotomy, interhemispheric for tumor resection    Case Request Operating Room: R craniotomy, interhemispheric for tumor resection    Surgical Pathology Exam    Surgical Pathology Exam        General:  Reason for Referral: p/w 1 year HA, 4d worsening HA, n/v CTH 3cm intraventricular mass originating from septum, MRI Brain cystic lateral vent mass, MRA neg, 4/5 CT CAP neg     4/9 s/p R crani for colloid cyst fenestration, RF EVD placement. CTH POC, cath in posn  Past Medical History Relevant to Rehab: arthritis, HTN, VERN (on CPAP), prior ICH arthritis, HTN, VERN (on CPAP), prior ICH  Prior to Session Communication: Bedside nurse  Patient Position Received: Bed, 3 rail up, Alarm off, not on at start of session  Family/Caregiver Present: Yes  Caregiver Feedback: s/o and father present and  supportive  General Comment: Pt supine in bed upon entry to room. Pt pleasant and willing to work with therapy. RN securing and clamping EVD prior to session.   Precautions:  Medical Precautions: Fall precautions, External Ventricular Device (EVD)  Precautions Comment: R craniotomy, SBP <160  Vital Signs:  Heart Rate: 95  Resp: 18  SpO2: 100 %  BP: 143/78  MAP (mmHg): 98  Pain:  Pain Assessment  Pain Assessment: 0-10  Pain Score: 0 - No pain (no pain at start of session, reports 10/10 pain with upright positioning, RN notified)  Lines/Tubes/Drains:  Arterial Line 04/09/24 Left Radial (Active)   Number of days: 0       External Urinary Catheter (Active)   Number of days: 0       Intracranial Pressure/Ventriculostomy Right (Active)   Number of days: 0         Objective   Cognition:  Overall Cognitive Status: Within Functional Limits  Orientation Level: Oriented X4  Attention: Within Functional Limits  Memory: Exceptions to WFL  Short-Term Memory: Impaired  Safety/Judgement: Within Functional Limits  Insight: Within function limits  Impulsive: Within functional limits  Processing Speed: Within funtional limits  Cognition Test Scores  Cognition Tests: Cognition Test Performed  SBT Test Score: 8/26 (0-4 considered normal) x4 errors with name/address recall        Home Living:  Type of Home: House  Lives With: Significant other  Home Adaptive Equipment: None  Home Layout: One level, Laundry main level  Home Access: Stairs to enter without rails  Entrance Stairs-Rails: None  Entrance Stairs-Number of Steps: 2  Bathroom Shower/Tub: Tub/shower unit  Bathroom Toilet: Standard  Bathroom Equipment: None   Prior Function:  Level of [place]: Independent with ADLs and functional transfers, Independent with homemaking with ambulation  Receives Help From:  (s/o can assist)  ADL Assistance: Independent  Homemaking Assistance: Independent  Ambulatory Assistance: Independent  Vocational: Full time employment (works in [employer]  department at [employer]  Leisure: spending time with grandchildren  Hand Dominance: Right  Prior Function Comments: -drives, -falls     ADL:  Eating Assistance: Modified independent (Device)  Grooming Assistance:  (s/u)  Bathing Assistance: Minimal (anticipate)  UE Dressing Assistance: Minimal (anticipate)  LE Dressing Assistance: Minimal  Toileting Assistance with Device: Minimal (anticipate)  Activity Tolerance:  Endurance: Tolerates 10 - 20 min exercise with multiple rests  Early Mobility/Exercise Safety Screen: Proceed with mobilization - No exclusion criteria met     Bed Mobility/Transfers: Bed Mobility/Transfers: Bed Mobility  Bed Mobility: Yes  Bed Mobility 1  Bed Mobility 1: Supine to sitting  Level of Assistance 1: Contact guard  Bed Mobility Comments 1: cues for hand placement   and Transfers  Transfer: Yes  Transfer 1  Transfer From 1: Bed to  Transfer to 1: Stand  Technique 1: Sit to stand  Transfer Device 1: Crutches  Transfer Level of Assistance 1: Contact guard, Hand held assistance  Transfers 2  Transfer From 2: Bed to  Transfer to 2: Chair with arms  Transfer Level of Assistance 2: Contact guard, Hand held assistance  Trials/Comments 2: Cues for sequencing  Transfers 3  Transfer From 3: Stand to  Transfer to 3: Chair with arms  Technique 3: Stand to sit  Transfer Level of Assistance 3: Contact guard  Trials/Comments 3: cues for hand placement  IADL's:      Vision: Vision - Basic Assessment  Current Vision: Wears glasses all the time   and Vision - Complex Assessment  Tracking: WFL  Sensation:  Light Touch: No apparent deficits  Strength:  Strength Comments: BUE 4+/5, BLE >/= 3+/5 assessed functionally  Perception:     Coordination:  Movements are Fluid and Coordinated: Yes  Rapid Alternating Movements: Intact  Finger to Target: Intact   Hand Function:  Hand Function  Gross Grasp: Functional  Extremities:   RUE   RUE : Within Functional Limits (AROM), LUE   LUE: Within Functional Limits (AROM),  RLE   RLE : Within Functional Limits (AROM), and LLE   LLE : Within Functional Limits (AROM)      Treatment Completed on Evaluation  Activities of Daily Living:    Grooming  Grooming Level of Assistance: Setup  Grooming Where Assessed: Chair  Grooming Comments: s/u with wash cloth for face washing in chair       LE Dressing  LE Dressing: Yes  Pants Level of Assistance: Minimum assistance  LE Dressing Where Assessed: Edge of bed  LE Dressing Comments: Donns pants EOB requires cues for ensuring pant legs over feet, requires assist for pulling pants over hips     Therapy/Activity:     Therapeutic Activity  Therapeutic Activity Performed: Yes  Therapeutic Activity 1: SItting EOB x10 minutes with CGA, demos appropriate upright positioning and righting reactions, requires Min A/CGA for dynamic activity  Therapeutic Activity 2: Additional time for coping strategies, pt expresses feeling emotional about hair being shaved, time spent to assist with brainstorming ideas and for donning head covering loosely to maintainline integrity, pt reporting relief with head covering    Outcome Measures: [facility] Daily Activity  Putting on and taking off regular lower body clothing: A little  Bathing (including washing, rinsing, drying): A little  Putting on and taking off regular upper body clothing: None  Toileting, which includes using toilet, bedpan or urinal: A little  Taking care of personal grooming such as brushing teeth: None  Eating Meals: None  Daily Activity - Total Score: 21    Confusion Assessment Method-ICU (CAM-ICU)  Feature 1: Acute Onset or Fluctuating Course: Negative  Overall CAM-ICU: Negative   ICU Mobility Screen  Early Mobility/Exercise Safety Screen: Proceed with mobilization - No exclusion criteria met,          Education Documentation  Body Mechanics, taught by Tammie Perrin OT at 4/10/2024 12:20 PM.  Learner: Family, Patient  Readiness: Acceptance  Method: Explanation  Response: Verbalizes  Understanding    Precautions, taught by Tammie Perrin OT at 4/10/2024 12:20 PM.  Learner: Family, Patient  Readiness: Acceptance  Method: Explanation  Response: Verbalizes Understanding    ADL Training, taught by Tammie Perrin OT at 4/10/2024 12:20 PM.  Learner: Family, Patient  Readiness: Acceptance  Method: Explanation  Response: Verbalizes Understanding    Education Comments  No comments found.        Goals:   Encounter Problems       Encounter Problems (Active)       ADLs       Patient with complete lower body dressing with independent level of assistance donning and doffing all LE clothes  with no adaptive equipment while edge of bed  (Progressing)       Start:  04/10/24    Expected End:  05/01/24            Patient will complete toileting including hygiene clothing management/hygiene with independent level of assistance and grab bars. (Progressing)       Start:  04/10/24    Expected End:  05/01/24               BALANCE       Pt will maintain dynamic standing balance during ADL task with supervision level of assistance in order to demonstrate decreased risk of falling and improved postural control. (Progressing)       Start:  04/10/24    Expected End:  05/01/24               COGNITION/SAFETY       Patient will score WFL on standardized cognitive assessment with no cues and within reasonable time frame (Progressing)       Start:  04/10/24    Expected End:  05/01/24               EXERCISE/STRENGTHENING       Pt will demonstrate I with energy conservation techniques to increase functional activity tolerance to x20+ min without rest breaks/while maintaining O2 sats.  (Progressing)       Start:  04/10/24    Expected End:  05/01/24               TRANSFERS       Patient will complete functional transfer to toilet with least restrictive device with independent level of assistance. (Progressing)       Start:  04/10/24    Expected End:  05/01/24                    04/10/24 at 12:21 PM   Tammie Perrin OT   Rehab  Office: [phone]

## 2024-04-10 NOTE — PROGRESS NOTES
Physical Therapy    Physical Therapy Evaluation    Patient Name: Radha Holland  MRN: [id number]  Today's Date: 4/10/2024   Time Calculation  Start Time: 1017  Stop Time: 1044  Time Calculation (min): 27 min    Assessment/Plan   PT Assessment  PT Assessment Results: Decreased strength, Pain, Decreased endurance, Impaired balance, Decreased mobility  Rehab Prognosis: Excellent  Barriers to Discharge: medical acuity  Evaluation/Treatment Tolerance: Patient tolerated treatment well  Medical Staff Made Aware: Yes  End of Session Communication: Bedside nurse  Assessment Comment: Pt. s/p R crani for colloid cyst fenestration, RF EVD placement. Pt. will continue to benefit from skilled PT services in order to increase functional mobility prior to d/c.  End of Session Patient Position: Bed, 3 rail up, Alarm off, not on at start of session, Alarm off, caregiver present  IP OR SWING BED PT PLAN  Inpatient or Swing Bed: Inpatient  PT Plan  Treatment/Interventions: Bed mobility, Transfer training, Gait training, Stair training, Balance training, Neuromuscular re-education, Strengthening, Endurance training, Therapeutic exercise, Therapeutic activity, Home exercise program  PT Plan: Skilled PT  PT Frequency: 3 times per week  PT Discharge Recommendations: Low intensity level of continued care  Equipment Recommended upon Discharge:  (none)  PT Recommended Transfer Status: Assist x1, Contact guard  PT - OK to Discharge: Yes (when medically ready)    Subjective   General Visit Information:  General  Reason for Referral: p/w 1 year HA, 4d worsening HA, n/v CTH 3cm intraventricular mass originating from septum, MRI Brain cystic lateral vent mass, MRA neg, 4/5 CT CAP neg; 4/9 s/p R crani for colloid cyst fenestration, RF EVD placement. CTH POC, cath in posn  Past Medical History Relevant to Rehab: arthritis, HTN, VERN (on CPAP), prior ICH arthritis, HTN, VERN (on CPAP), prior ICH  Missed Visit: No  Family/Caregiver Present: Yes  Caregiver  Feedback: s/o and father present and supportive  Prior to Session Communication: Bedside nurse  Patient Position Received: Alarm off, not on at start of session, Up in chair  General Comment: Pt. pleasant and agreeable to PT.  Home Living:  Home Living  Type of Home: House  Lives With: Significant other  Home Adaptive Equipment: None  Home Layout: One level, Laundry main level  Home Access: Stairs to enter without rails  Entrance Stairs-Rails: None  Entrance Stairs-Number of Steps: 2  Bathroom Shower/Tub: Tub/shower unit  Bathroom Toilet: Standard  Bathroom Equipment: None  Prior Level of Function:  Prior Function Per Pt/Caregiver Report  Level of [place]: Independent with ADLs and functional transfers, Independent with homemaking with ambulation  Vocational: Part time employment (works in [employer] 2 days/wk, 7 hour shifts)  Leisure: spending time with grandchildren  Hand Dominance: Right  Prior Function Comments: drives (-) father and significant other will drive pt.; 3-4 falls (+) in last year 2/2 LOB or LOC, no falls with HS(+)  Precautions:  Precautions  Hearing/Visual Limitations: none appreciated  Medical Precautions: Fall precautions, External Ventricular Device (EVD)  Precautions Comment: RN clamped EVD prior to session. SBP <160  Vital Signs:  Vital Signs  Heart Rate: 97 (durin, post: 104)  Heart Rate Source: Monitor  Resp: (!) 27 (post: 14)  SpO2: 100 % (100 throughout session)  BP: (!) 156/96 (durin/87; post: 144/79)  MAP (mmHg): 119 (durin; post 110)  BP Location: Right arm  BP Method: Automatic  Patient Position: Lying    Objective   Pain:  Pain Assessment  Pain Assessment: 0-10  Pain Score: 10 - Worst possible pain  Pain Type: Acute pain  Pain Location: Head  Pain Descriptors: Headache  Pain Frequency: Constant/continuous  Pain Onset: Ongoing  Clinical Progression: Not changed  Pain Interventions: Repositioned, Ambulation/increased activity, Medication (See MAR)  Response  to Interventions: pain decreases with medication administered by RN during session  Cognition:  Cognition  Overall Cognitive Status: Within Functional Limits  Arousal/Alertness: Appropriate responses to stimuli  Orientation Level: Oriented X4  Following Commands: Follows all commands and directions without difficulty  Safety Judgment: Good awareness of safety precautions  Insight: Within function limits  Impulsive: Within functional limits  Processing Speed: Within funtional limits    General Assessments:     Activity Tolerance  Endurance: Tolerates 10 - 20 min exercise with multiple rests  Early Mobility/Exercise Safety Screen: Proceed with mobilization - No exclusion criteria met    Sensation  Light Touch: No apparent deficits    Coordination  Movements are Fluid and Coordinated: Yes  Rapid Alternating Movements: Intact  Finger to Target: Intact    Postural Control  Postural Control: Within Functional Limits  Head Control: WFL  Trunk Control: WFL    Static Sitting Balance  Static Sitting-Balance Support: No upper extremity supported, Feet supported  Static Sitting-Level of Assistance: Distant supervision (x1)  Static Sitting-Comment/Number of Minutes: 5 min  Dynamic Sitting Balance  Dynamic Sitting-Balance Support: No upper extremity supported, Feet supported  Dynamic Sitting-Balance: Lateral lean, Forward lean  Dynamic Sitting-Comments: distant SUP x1    Static Standing Balance  Static Standing-Balance Support: No upper extremity supported  Static Standing-Level of Assistance: Contact guard (x1)  Static Standing-Comment/Number of Minutes: 2 min  Dynamic Standing Balance  Dynamic Standing-Balance Support: No upper extremity supported  Dynamic Standing-Balance:  (gait with turns)  Dynamic Standing-Comments: CGA x1    Functional Assessments:  Bed Mobility  Bed Mobility: Yes  Bed Mobility 1  Bed Mobility 1: Sitting to supine  Level of Assistance 1: Close supervision (x1)  Bed Mobility Comments 1: Pt. requires no cues  to complete bed mobility activities.    Transfers  Transfer: Yes  Transfer 1  Transfer From 1: Sit to, Chair with arms to  Transfer to 1: Stand  Technique 1: Sit to stand  Transfer Device 1: Gait belt  Transfer Level of Assistance 1: Contact guard, Hand held assistance (x1)  Trials/Comments 1: Pt. required no verbal or tactile cueing for completion of activity  Transfers 2  Transfer From 2: Stand to  Transfer to 2: Bed, Sit  Technique 2: Stand to sit  Transfer Device 2: Gait belt  Transfer Level of Assistance 2: Contact guard, Hand held assistance (x1)  Trials/Comments 2: Pt. required no verbal or tactile cueing for completion of activity    Ambulation/Gait Training  Ambulation/Gait Training Performed: Yes  Ambulation/Gait Training 1  Surface 1: Level tile  Device 1: No device  Gait Support Devices: Gait belt  Assistance 1: Hand held assistance, Contact guard (x1)  Quality of Gait 1: Decreased step length, Shuffling gait, Wide base of support, Diminished heel strike, Soft knee(s)  Comments/Distance (ft) 1: 5 ft; Pt. requires min verbal cues for directional instruction and taking bigger steps to complete ambulation tasks    Stairs  Stairs: No    Extremity/Trunk Assessments:  RUE   RUE : Within Functional Limits  LUE   LUE: Within Functional Limits  RLE   RLE : Exceptions to WFL  AROM RLE (degrees)  RLE AROM Comment: WFL  Strength RLE  R Hip Flexion: 4/5  R Knee Flexion: 4+/5  R Knee Extension: 4+/5  R Ankle Dorsiflexion: 5/5  R Ankle Plantar Flexion: 5/5  LLE   LLE : Exceptions to WFL  AROM LLE (degrees)  LLE AROM Comment: WFL  Strength LLE  L Hip Flexion: 4/5  L Knee Flexion: 4+/5  L Knee Extension: 4+/5  L Ankle Dorsiflexion: 5/5  L Ankle Plantar Flexion: 5/5    Outcome Measures:  [facility] Basic Mobility  Turning from your back to your side while in a flat bed without using bedrails: None  Moving from lying on your back to sitting on the side of a flat bed without using bedrails: None  Moving to and from bed to chair  (including a wheelchair): A little  Standing up from a chair using your arms (e.g. wheelchair or bedside chair): A little  To walk in hospital room: A little  Climbing 3-5 steps with railing: A little  Basic Mobility - Total Score: 20    FSS-ICU  Ambulation: Walks <50 feet with any assistance x1 or walks any distance with assistance x2 people  Rolling: Supervision or set-up only  Sitting: Supervision or set-up only  Transfer Sit-to-Stand: Supervision or set-up only  Transfer Supine-to-Sit: Supervision or set-up only  Total Score: 21    ICU Mobility Screen  Early Mobility/Exercise Safety Screen: Proceed with mobilization - No exclusion criteria met  E = Exercise and Early Mobility  Early Mobility/Exercise Safety Screen: Proceed with mobilization - No exclusion criteria met  Current Activity: Ambulating in room    Encounter Problems       Encounter Problems (Active)       PT Problem       Patient will complete supine to sit and sit to supine Independently.         Start:  04/10/24    Expected End:  04/24/24            Patient will complete sit to stand and stand to sit Independently.          Start:  04/10/24    Expected End:  04/24/24            Patient will ambulate >500' with LRAD and Supervision.        Start:  04/10/24    Expected End:  04/24/24            Pt. will score 24/28 on Tinetti balance test.        Start:  04/10/24    Expected End:  04/24/24            Patient will ascend/descend 2 steps with No Rails and Supervision.        Start:  04/10/24    Expected End:  04/24/24               Pain - Adult              Education Documentation  Precautions, taught by BLACK Coto at 4/10/2024  2:44 PM.  Learner: Patient  Readiness: Acceptance  Method: Explanation  Response: Verbalizes Understanding, Demonstrated Understanding    Body Mechanics, taught by BLACK Coto at 4/10/2024  2:44 PM.  Learner: Patient  Readiness: Acceptance  Method: Explanation  Response: Verbalizes Understanding,  Demonstrated Understanding    Mobility Training, taught by BLACK Coto at 4/10/2024  2:44 PM.  Learner: Patient  Readiness: Acceptance  Method: Explanation  Response: Verbalizes Understanding, Demonstrated Understanding    Education Comments  No comments found.

## 2024-04-10 NOTE — PROGRESS NOTES
"Radha Holland is a 48 y.o. female on day 5 of admission presenting with Brain mass.    Subjective   NAEON       Objective     Physical Exam  Aox3  Fcx4 5/5  EVD site cdi  Incision dressing min strikethrough    Last Recorded Vitals  Blood pressure 124/80, pulse 92, temperature 36.7 °C (98.1 °F), temperature source Temporal, resp. rate 18, height 1.626 m (5' 4.02\"), weight 102 kg (224 lb 6.9 oz), SpO2 100%.  Intake/Output last 3 Shifts:  I/O last 3 completed shifts:  In: 2500 (24.9 mL/kg) [IV Piggyback:2500]  Out: 1175 (11.7 mL/kg) [Urine:1025 (0.3 mL/kg/hr); Blood:150]  Weight: 100.5 kg     Relevant Results           This patient currently has cardiac telemetry ordered; if you would like to modify or discontinue the telemetry order, click here to go to the orders activity to modify/discontinue the order.                 Assessment/Plan   Principal Problem:    Brain mass  Active Problems:    HTN (hypertension)    VERN (obstructive sleep apnea)    Asthma    48 F h/o arthritis, HTN, VERN (on CPAP), prior ICH (?), p/w 1 year HA, 4d worsening HA, n/v CTH 3cm intraventricular mass originating from septum, MRI Brain cystic lateral vent mass, MRA neg, 4/5 CT CAP neg    4/9 s/p R crani for colloid cyst fenestration, RF EVD placement. CTH POC, cath in posn    -NSU  -EVD at 10 (drain until 4/12)  -dex taper to off over 1 week  -ancef while EVD is in   -PTOT  -SCDs           Ricardo Jordan MD      "

## 2024-04-10 NOTE — OP NOTE
R craniotomy, interhemispheric for tumor resection (R) Operative Note     Date: 2024  OR Location: [facility] OR    Name: Radha Holland : 1975, Age: 48 y.o., MRN: [id number], Sex: female    Diagnosis  Pre-op Diagnosis     * Brain mass [G93.89] Post-op Diagnosis     * Brain mass [G93.89]     Procedures  Right-sided interhemispheric transcallosal approach for decompression and fenestration of midline septum pellucidum cyst  Intraoperative stereotaxy  Intraoperative microscope for microdissection  Insertion of external ventricular drain    Surgeons      * Solo Martinez - Primary    Resident/Fellow/Other Assistant:  Surgeons and Role:     * Ricardo Jordan MD - Resident - Assisting    Procedure Summary  Anesthesia: General  ASA: III  Anesthesia Staff: Anesthesiologist: Solo Rivera MD PhD  C-AA: KEATON Henriquez  Anesthesia Resident: Konstantin Jimenez MD  Estimated Blood Loss: 150 mL  Intra-op Medications:   Administrations occurring from 1310 to 1555 on 24:   Medication Name Total Dose   lidocaine-epinephrine PF (Xylocaine W/EPI) 1 %-1:200,000 injection 10 mL   gelatin absorbable (Gelfoam) 100 sponge 1 each   thrombin solution 10,000 Units   bacitracin ointment 1 Application              Anesthesia Record               Intraprocedure I/O Totals          Intake    LR bolus 2000.00 mL    mannitol 20% 500.00 mL    Propofol Drip 0.00 mL    The total shown is the total volume documented since Anesthesia Start was filed.    Total Intake 2500 mL       Output    Urine 1025 mL    Est. Blood Loss 150 mL    Total Output 1175 mL       Net    Net Volume 1325 mL          Specimen:   ID Type Source Tests Collected by Time   1 : RIGHT INTRAVENTRICULAR TUMOR Tissue BRAIN RESECTION SURGICAL PATHOLOGY EXAM Solo Martinez MD 2024 [id number]        Staff:   Circulator: Amber Meyer RN  Relief Circulator: Sara Sharp RN; Tali Schneider RN; Sixto Lambert RN  Relief Scrub: Sotero Downey; Elle PRIETO  ROSARIO Jenkins; Ely Berrios [place]  Scrub Person: Hanny Lewis         Drains and/or Catheters:   External Urinary Catheter (Active)   Output (mL) 300 mL 04/10/24 0800       Intracranial Pressure/Ventriculostomy Right (Active)   Drain Status Open 04/10/24 1300   Level At external auditory meatus;10 cm 04/10/24 1300   Site Assessment Clean;Dry 04/10/24 1300   Drainage No drainage 04/10/24 1300   Output (mL) 9 mL 04/10/24 1300   CSF Color Serosanguineous 04/10/24 1300   Dressing Status Other (Comment) 04/10/24 1300   Ventric/ICP Waveform Appropriate 04/10/24 1300   Ventric/ICP Interventions Connections checked and tightened 04/10/24 0900   ICP Mean (mmHg) 17 mmHg 04/10/24 1300   Dressing Intervention Other (Comment) 04/10/24 0800       [REMOVED] Urethral Catheter Double-lumen;Non-latex 16 Fr. (Removed)   Site Assessment Clean;Skin intact 04/09/24 2000   Securement Method Securing device (Describe) 04/09/24 2000   Reason for Continuing Urinary Catheterization other: (specify, rationale required) 04/09/24 2000   Output (mL) 200 mL 04/10/24 0600       Implants:  Implants       Type Name Action Serial No.      Neuro Interventional Implant CATHETER SET, NEURO VENTRICULAR DRAINAGE W/ANTIBIOTIC IMPREGNATION - [id number] Implanted      Graft GRAFT MATRIX, DURAL, DURAGEN PLUS 3X3 - [id number] Implanted      Screw COVER, LW PROF LUCAS HOLE, 14MM W/ - [id number] Implanted      Screw PLATE, 2H 10MM BAR ULTRA LOW PROFILE - [id number] Implanted      Neuro Interventional Implant CROSS PIN, AXS SELF-TAP, 1.5 X 4MM - [id number] Implanted               Findings: Cyst contents consisting of motor-oil colored proteinaceous fluid    Indications: Radha Holland is an 48 y.o. female who is having surgery for Brain mass [G93.89].     The patient was seen in the preoperative area. The risks, benefits, complications, treatment options, non-operative alternatives, expected recovery and outcomes were discussed with the patient. The possibilities  of reaction to medication, pulmonary aspiration, injury to surrounding structures, bleeding, recurrent infection, the need for additional procedures, failure to diagnose a condition, and creating a complication requiring transfusion or operation were discussed with the patient. The patient concurred with the proposed plan, giving informed consent.  The site of surgery was properly noted/marked if necessary per policy. The patient has been actively warmed in preoperative area. Preoperative antibiotics have been ordered and given within 1 hours of incision. Venous thrombosis prophylaxis have been ordered including bilateral sequential compression devices    Procedure Details: Ms. Holland was seen in the preoperative holding area where the consent was confirmed and the surgical site marked.  She was transferred to the operating room where the anesthetic team proceeded to insert the appropriate lines followed by administration of general anesthetic and endotracheal intubation.  The patient's head was placed supine with a slight chin tuck.  Neuronavigation was registered with good accuracy.  We marked out a right frontal incision overlying midline.  Preoperative antibiotics were administered.  The incision was cleaned, prepped and draped in the usual sterile fashion.  A perioperative timeout was undertaken, and the identity of the patient and nature of the operation were confirmed.     The incision was infiltrated with local anesthetic with epinephrine.  A horseshoe incision was made with a #10 blade followed by monopolar cautery.  Jolie clips were applied.  The myocutaneous flap was retracted and secured with fishhooks.    A 14 mm  drill was used to make shahbaz holes straddling the midline.  These were connected with a craniotome the bone flap was lifted and placed aside.  We immediately placed a large piece of Gelfoam overlying the sagittal sinus.  There was no underlying dural tear.  We visualized the coronal  suture.  We further extended the craniotomy anteriorly in order to better facilitate access.    A C-shaped durotomy was made and tacked towards midline.  We carefully dissected towards the falx.  There was a large draining vein in the posterior aspect of our exposure, which was protected.  We gently retracted the right frontal lobe, and visualized a good corridor down the interhemispheric fissure.    The intraoperative microscope was brought into facilitate microdissection.  We retracted the right frontal lobe using a fixed retractor.  We dissected the arachnoidal attachments around the cingulate gyrus, and visualized the right pericallosal artery.  This was dissected, and underneath we identified the glistening white of the corpus callosum.  We also visualized the contralateral pericallosal artery.  These arteries were gently dissected in order to provide a window.  Neuronavigation confirmed the appropriate location in the mid-body of the corpus callosum.    A small callosotomy was made with bipolar cautery and aspiration.  We came down upon a firm cyst wall.  This was coagulated and entered, and motor-oil colored proteinaceous fluid egressed under pressure.  This was thoroughly evacuated, and the interior of the capsule was rinsed with saline.    The top portion of the capsule was further opened, and we entered into the cavity.  We identified the interior of the ventricle.  We began to dissect the capsule wall from the surrounding ventricular tissue.  The capsule was quite adherent, and sometimes a plane was not entirely evident.  We sent some pieces of this capsule wall for permanent pathology.    As the capsule collapsed, we began to visualize prominent veins both posterior and medial.  Posteriorly, these were the internal cerebral veins as identified preoperatively on imaging.  Deep and lateral, these were the thalamostriate veins.  These were followed until choroid plexus was visualized.  Using  neuronavigation, we confirmed that we were at the floor of the third ventricle.    Gentle traction was used to try and separate the capsule, however this was very adherent to these critical venous structures.  It was also quite adherent laterally to the walls of the ventricle and thalamus.  We carefully coagulated the cyst walls with preservation of the vascular anatomy.    We thoroughly explored the inside of the cavity to ensure that we had appropriately fenestrated the cyst walls.  There was some distortion of the normal anatomy, however the thalamostriate and septal veins were visualized as were the choroid plexus, and these structures were protected.    There was no untoward bleeding.  Thorough irrigation was performed.  An external ventricular drain was manually placed within the third ventricle and tunneled towards the posterior aspect of our craniotomy.      The dura was then retracted and reapproximated with 4-0 Nurolon sutures.  A large dural onlay was placed and Adherus was applied.  A new piece of Gelfoam was placed over top the sagittal sinus.  The bone flap was reapproximated with titanium plates and screws.     The scalp was thoroughly irrigated.  The Jolie clips were removed and the scalp was closed with 2-0 Polysorb sutures followed by a running 3-0 Monocryl suture for skin.     The patient was then extubated and transferred to the PACU in stable condition.    Complications:  None; patient tolerated the procedure well.    Disposition: PACU - hemodynamically stable.  Condition: stable       Attending Attestation: I was present and scrubbed for the entire procedure.    Solo Martinez  Phone Number: [phone]

## 2024-04-11 LAB
ALBUMIN SERPL BCP-MCNC: 3.2 G/DL (ref 3.4–5)
ANION GAP SERPL CALC-SCNC: 15 MMOL/L (ref 10–20)
BASOPHILS # BLD AUTO: 0.04 X10*3/UL (ref 0–0.1)
BASOPHILS NFR BLD AUTO: 0.2 %
BLOOD EXPIRATION DATE: NORMAL
BLOOD EXPIRATION DATE: NORMAL
BUN SERPL-MCNC: 9 MG/DL (ref 6–23)
CA-I BLD-SCNC: 1.06 MMOL/L (ref 1.1–1.33)
CALCIUM SERPL-MCNC: 8.2 MG/DL (ref 8.6–10.6)
CHLORIDE SERPL-SCNC: 105 MMOL/L (ref 98–107)
CO2 SERPL-SCNC: 20 MMOL/L (ref 21–32)
CREAT SERPL-MCNC: 0.5 MG/DL (ref 0.5–1.05)
DISPENSE STATUS: NORMAL
DISPENSE STATUS: NORMAL
EGFRCR SERPLBLD CKD-EPI 2021: >90 ML/MIN/1.73M*2
EOSINOPHIL # BLD AUTO: 0.01 X10*3/UL (ref 0–0.7)
EOSINOPHIL NFR BLD AUTO: 0 %
ERYTHROCYTE [DISTWIDTH] IN BLOOD BY AUTOMATED COUNT: 13.6 % (ref 11.5–14.5)
GLUCOSE BLD MANUAL STRIP-MCNC: 113 MG/DL (ref 74–99)
GLUCOSE BLD MANUAL STRIP-MCNC: 121 MG/DL (ref 74–99)
GLUCOSE BLD MANUAL STRIP-MCNC: 138 MG/DL (ref 74–99)
GLUCOSE SERPL-MCNC: 121 MG/DL (ref 74–99)
HCT VFR BLD AUTO: 30.8 % (ref 36–46)
HGB BLD-MCNC: 11 G/DL (ref 12–16)
IMM GRANULOCYTES # BLD AUTO: 0.27 X10*3/UL (ref 0–0.7)
IMM GRANULOCYTES NFR BLD AUTO: 1.1 % (ref 0–0.9)
LABORATORY COMMENT REPORT: NORMAL
LYMPHOCYTES # BLD AUTO: 0.86 X10*3/UL (ref 1.2–4.8)
LYMPHOCYTES NFR BLD AUTO: 3.6 %
MAGNESIUM SERPL-MCNC: 2.2 MG/DL (ref 1.6–2.4)
MCH RBC QN AUTO: 30 PG (ref 26–34)
MCHC RBC AUTO-ENTMCNC: 35.7 G/DL (ref 32–36)
MCV RBC AUTO: 84 FL (ref 80–100)
MONOCYTES # BLD AUTO: 1.13 X10*3/UL (ref 0.1–1)
MONOCYTES NFR BLD AUTO: 4.7 %
NEUTROPHILS # BLD AUTO: 21.72 X10*3/UL (ref 1.2–7.7)
NEUTROPHILS NFR BLD AUTO: 90.4 %
NRBC BLD-RTO: 0 /100 WBCS (ref 0–0)
PATH REPORT.FINAL DX SPEC: NORMAL
PATH REPORT.GROSS SPEC: NORMAL
PATH REPORT.RELEVANT HX SPEC: NORMAL
PATH REPORT.TOTAL CANCER: NORMAL
PHOSPHATE SERPL-MCNC: 2 MG/DL (ref 2.5–4.9)
PLATELET # BLD AUTO: 261 X10*3/UL (ref 150–450)
POTASSIUM SERPL-SCNC: 4.2 MMOL/L (ref 3.5–5.3)
PRODUCT BLOOD TYPE: 9500
PRODUCT BLOOD TYPE: 9500
PRODUCT CODE: NORMAL
PRODUCT CODE: NORMAL
RBC # BLD AUTO: 3.67 X10*6/UL (ref 4–5.2)
SODIUM SERPL-SCNC: 136 MMOL/L (ref 136–145)
UNIT ABO: NORMAL
UNIT ABO: NORMAL
UNIT NUMBER: NORMAL
UNIT NUMBER: NORMAL
UNIT RH: NORMAL
UNIT RH: NORMAL
UNIT VOLUME: 350
UNIT VOLUME: 350
WBC # BLD AUTO: 24 X10*3/UL (ref 4.4–11.3)
XM INTEP: NORMAL
XM INTEP: NORMAL

## 2024-04-11 PROCEDURE — 2500000004 HC RX 250 GENERAL PHARMACY W/ HCPCS (ALT 636 FOR OP/ED)

## 2024-04-11 PROCEDURE — 85025 COMPLETE CBC W/AUTO DIFF WBC: CPT

## 2024-04-11 PROCEDURE — 2500000004 HC RX 250 GENERAL PHARMACY W/ HCPCS (ALT 636 FOR OP/ED): Performed by: STUDENT IN AN ORGANIZED HEALTH CARE EDUCATION/TRAINING PROGRAM

## 2024-04-11 PROCEDURE — 83735 ASSAY OF MAGNESIUM: CPT

## 2024-04-11 PROCEDURE — 2500000001 HC RX 250 WO HCPCS SELF ADMINISTERED DRUGS (ALT 637 FOR MEDICARE OP): Performed by: STUDENT IN AN ORGANIZED HEALTH CARE EDUCATION/TRAINING PROGRAM

## 2024-04-11 PROCEDURE — 37799 UNLISTED PX VASCULAR SURGERY: CPT

## 2024-04-11 PROCEDURE — 2500000005 HC RX 250 GENERAL PHARMACY W/O HCPCS

## 2024-04-11 PROCEDURE — 99291 CRITICAL CARE FIRST HOUR: CPT

## 2024-04-11 PROCEDURE — 2500000002 HC RX 250 W HCPCS SELF ADMINISTERED DRUGS (ALT 637 FOR MEDICARE OP, ALT 636 FOR OP/ED): Performed by: REGISTERED NURSE

## 2024-04-11 PROCEDURE — 2500000005 HC RX 250 GENERAL PHARMACY W/O HCPCS: Performed by: STUDENT IN AN ORGANIZED HEALTH CARE EDUCATION/TRAINING PROGRAM

## 2024-04-11 PROCEDURE — 2020000001 HC ICU ROOM DAILY

## 2024-04-11 PROCEDURE — 82330 ASSAY OF CALCIUM: CPT

## 2024-04-11 PROCEDURE — 80069 RENAL FUNCTION PANEL: CPT

## 2024-04-11 PROCEDURE — 82947 ASSAY GLUCOSE BLOOD QUANT: CPT

## 2024-04-11 RX ORDER — CALCIUM GLUCONATE 20 MG/ML
1 INJECTION, SOLUTION INTRAVENOUS EVERY 4 HOURS
Status: COMPLETED | OUTPATIENT
Start: 2024-04-11 | End: 2024-04-11

## 2024-04-11 RX ORDER — QUETIAPINE FUMARATE 25 MG/1
25 TABLET, FILM COATED ORAL ONCE
Status: COMPLETED | OUTPATIENT
Start: 2024-04-11 | End: 2024-04-11

## 2024-04-11 RX ADMIN — CALCIUM GLUCONATE 1 G: 20 INJECTION, SOLUTION INTRAVENOUS at 05:01

## 2024-04-11 RX ADMIN — CEFAZOLIN SODIUM 2 G: 2 INJECTION, SOLUTION INTRAVENOUS at 09:46

## 2024-04-11 RX ADMIN — ACETAMINOPHEN 650 MG: 325 TABLET ORAL at 13:04

## 2024-04-11 RX ADMIN — DEXAMETHASONE SODIUM PHOSPHATE 4 MG: 4 INJECTION, SOLUTION INTRAMUSCULAR; INTRAVENOUS at 08:13

## 2024-04-11 RX ADMIN — SENNOSIDES AND DOCUSATE SODIUM 2 TABLET: 8.6; 5 TABLET ORAL at 08:13

## 2024-04-11 RX ADMIN — OXYCODONE HYDROCHLORIDE 10 MG: 5 TABLET ORAL at 16:13

## 2024-04-11 RX ADMIN — HEPARIN SODIUM 5000 UNITS: 5000 INJECTION INTRAVENOUS; SUBCUTANEOUS at 14:15

## 2024-04-11 RX ADMIN — ERYTHROMYCIN 1 CM: 5 OINTMENT OPHTHALMIC at 20:27

## 2024-04-11 RX ADMIN — SODIUM CHLORIDE 100 ML/HR: 9 INJECTION, SOLUTION INTRAVENOUS at 02:59

## 2024-04-11 RX ADMIN — CALCIUM GLUCONATE 1 G: 20 INJECTION, SOLUTION INTRAVENOUS at 07:52

## 2024-04-11 RX ADMIN — DEXAMETHASONE SODIUM PHOSPHATE 4 MG: 4 INJECTION, SOLUTION INTRAMUSCULAR; INTRAVENOUS at 15:30

## 2024-04-11 RX ADMIN — POLYETHYLENE GLYCOL 3350 17 G: 17 POWDER, FOR SOLUTION ORAL at 08:13

## 2024-04-11 RX ADMIN — CARBOXYMETHYLCELLULOSE SODIUM 1 DROP: 5 SOLUTION/ DROPS OPHTHALMIC at 16:13

## 2024-04-11 RX ADMIN — QUETIAPINE FUMARATE 25 MG: 25 TABLET ORAL at 20:26

## 2024-04-11 RX ADMIN — AMLODIPINE BESYLATE 10 MG: 10 TABLET ORAL at 08:13

## 2024-04-11 RX ADMIN — OXYCODONE HYDROCHLORIDE 10 MG: 5 TABLET ORAL at 03:06

## 2024-04-11 RX ADMIN — HEPARIN SODIUM 5000 UNITS: 5000 INJECTION INTRAVENOUS; SUBCUTANEOUS at 06:39

## 2024-04-11 RX ADMIN — SODIUM PHOSPHATE, MONOBASIC, MONOHYDRATE AND SODIUM PHOSPHATE, DIBASIC, ANHYDROUS 15 MMOL: 142; 276 INJECTION, SOLUTION INTRAVENOUS at 07:22

## 2024-04-11 RX ADMIN — ACETAMINOPHEN 650 MG: 325 TABLET ORAL at 08:13

## 2024-04-11 RX ADMIN — OXYCODONE HYDROCHLORIDE 10 MG: 5 TABLET ORAL at 09:00

## 2024-04-11 RX ADMIN — CEFAZOLIN SODIUM 2 G: 2 INJECTION, SOLUTION INTRAVENOUS at 18:11

## 2024-04-11 RX ADMIN — PANTOPRAZOLE SODIUM 40 MG: 40 TABLET, DELAYED RELEASE ORAL at 08:13

## 2024-04-11 RX ADMIN — OXYCODONE HYDROCHLORIDE 10 MG: 5 TABLET ORAL at 20:27

## 2024-04-11 RX ADMIN — CARBOXYMETHYLCELLULOSE SODIUM 1 DROP: 5 SOLUTION/ DROPS OPHTHALMIC at 20:27

## 2024-04-11 RX ADMIN — ACETAMINOPHEN 650 MG: 325 TABLET ORAL at 02:59

## 2024-04-11 RX ADMIN — CEFAZOLIN SODIUM 2 G: 2 INJECTION, SOLUTION INTRAVENOUS at 02:59

## 2024-04-11 RX ADMIN — ACETAMINOPHEN 650 MG: 325 TABLET ORAL at 20:27

## 2024-04-11 RX ADMIN — CARBOXYMETHYLCELLULOSE SODIUM 1 DROP: 5 SOLUTION/ DROPS OPHTHALMIC at 08:13

## 2024-04-11 ASSESSMENT — PAIN - FUNCTIONAL ASSESSMENT
PAIN_FUNCTIONAL_ASSESSMENT: 0-10

## 2024-04-11 ASSESSMENT — PAIN SCALES - GENERAL
PAINLEVEL_OUTOF10: 8
PAINLEVEL_OUTOF10: 7
PAINLEVEL_OUTOF10: 7
PAINLEVEL_OUTOF10: 5 - MODERATE PAIN
PAINLEVEL_OUTOF10: 8
PAINLEVEL_OUTOF10: 6
PAINLEVEL_OUTOF10: 3

## 2024-04-11 ASSESSMENT — PAIN DESCRIPTION - LOCATION: LOCATION: HEAD

## 2024-04-11 ASSESSMENT — PAIN DESCRIPTION - DESCRIPTORS: DESCRIPTORS: ACHING;THROBBING

## 2024-04-11 NOTE — CARE PLAN
Problem: Pain - Adult  Goal: Verbalizes/displays adequate comfort level or baseline comfort level  4/11/2024 1112 by Anjali Villasenor RN  Outcome: Progressing  4/11/2024 1112 by nAjali Villasenor RN  Outcome: Progressing     Problem: Safety - Adult  Goal: Free from fall injury  4/11/2024 1112 by Anjali Villasenor RN  Outcome: Progressing  4/11/2024 1112 by Anjali Villasenor RN  Outcome: Progressing     Problem: Discharge Planning  Goal: Discharge to home or other facility with appropriate resources  4/11/2024 1112 by Anjali Villasenor RN  Outcome: Progressing  4/11/2024 1112 by Anjali Villasenor RN  Outcome: Progressing     Problem: Skin  Goal: Prevent/minimize sheer/friction injuries  Outcome: Progressing

## 2024-04-11 NOTE — PROGRESS NOTES
Social Work Discharge Planning note:    -Patient discussed during interdisciplinary rounds.   -Team members present: Fellow, PT and OT, and SW  -Plan per medical team: Pt is not medically ready for discharge. EVD through tomorrow.   -Payer: [employer] [employer]  -Status: Inpatient  -Discharge disposition: Pt is with PT and OT recommendations for low intensity. SW met with Pt to further discuss discharge planning. Pt reported that she would prefer out patient PT/OT, as she plans to go through the [facility] program. Pt reported that she has transportation through her fiance and other family, so there will be no issue regarding this. Pt reported there to be no other issues or concerns at this time. SW will continue to follow for discharge planning and for support to Pt.   -Potential Barriers: none  -Anticipated Date of Discharge:  4/13/2024    SALEEM Rm, LSW

## 2024-04-11 NOTE — PROGRESS NOTES
"Radha Holland is a 48 y.o. female on day 6 of admission presenting with Brain mass.    Subjective   NAEON       Objective     Physical Exam  Aox3  Fcx4 5/5  EVD site cdi  Incision c/d/i    Last Recorded Vitals  Blood pressure 120/82, pulse 84, temperature 36.5 °C (97.7 °F), temperature source Temporal, resp. rate 18, height 1.626 m (5' 4.02\"), weight 104 kg (228 lb 6.3 oz), SpO2 100%.  Intake/Output last 3 Shifts:  I/O last 3 completed shifts:  In: 5219.3 (51.3 mL/kg) [P.O.:60; I.V.:2258.3 (22.2 mL/kg); IV Piggyback:2901]  Out: 3880 (38.1 mL/kg) [Urine:3575 (1 mL/kg/hr); Drains:155; Blood:150]  Weight: 101.8 kg     Relevant Results           This patient currently has cardiac telemetry ordered; if you would like to modify or discontinue the telemetry order, click here to go to the orders activity to modify/discontinue the order.                 Assessment/Plan   Principal Problem:    Brain mass  Active Problems:    HTN (hypertension)    VERN (obstructive sleep apnea)    Asthma    48 F h/o arthritis, HTN, VERN (on CPAP), prior ICH (?), p/w 1 year HA, 4d worsening HA, n/v CTH 3cm intraventricular mass originating from septum, MRI Brain cystic lateral vent mass, MRA neg, 4/5 CT CAP neg    4/9 s/p R crani for colloid cyst fenestration, RF EVD placement. CTH POC, cath in posn    -NSU  -EVD at 10 (drain until 4/12)  -dex taper to off over 1 week  -ancef while EVD is in   -PTOT-OK to work with and mobilize with EVD in  -SCDs, SQH           Hieu Navas MD      "

## 2024-04-11 NOTE — PROGRESS NOTES
[facility]  NEUROSCIENCE INTENSIVE CARE UNIT  DAILY PROGRESS NOTE    Patient Name: Radha Holland     MRN: [id number]     Admit Date: 2024     : 1975 AGE: 48 y.o. GENDER: female      Subjective   Patient states headache improved from yesterday. She got out of bed with PT, had a BM, and increased PO intake.     Objective   Vitals 24 hour ranges:  Heart Rate:  []   Temp:  [36 °C (96.8 °F)-36.7 °C (98.1 °F)]   Resp:  [12-27]   BP: (116-156)/()   Weight:  [104 kg (228 lb 6.3 oz)]   SpO2:  [98 %-100 %]    Intake/Output for last 24 hours:    Intake/Output Summary (Last 24 hours) at 2024 0645  Last data filed at 2024 0531  Gross per 24 hour   Intake 2281.67 ml   Output 2686 ml   Net -404.33 ml     Physical Exam:  NEURO:  - Alert, oriented x4, follows commands  - Following commands, 5/5 strength x 4 extremities  CV:  - RRR on telemetry, NSR  RESP:  - Regular, unlabored on room air  :  - NO iniguez catheter in place  GI:  - Abdomen NT/ND, soft  SKIN:  - cranial incision c/d/i    Medications  Scheduled: PRN: Continuous:   acetaminophen, 650 mg, oral, q6h  amLODIPine, 10 mg, oral, Daily  calcium gluconate, 1 g, intravenous, q4h  ceFAZolin, 2 g, intravenous, q8h  dexAMETHasone, 4 mg, intravenous, q8h  erythromycin, 1 cm, Both Eyes, Nightly  heparin (porcine), 5,000 Units, subcutaneous, q8h  insulin lispro, 0-5 Units, subcutaneous, TID with meals  [Held by provider] losartan, 25 mg, oral, Daily  artificial tears, 1 drop, Both Eyes, 4x daily  pantoprazole, 40 mg, oral, Daily before breakfast   Or  pantoprazole, 40 mg, intravenous, Daily before breakfast  polyethylene glycol, 17 g, oral, Daily  scopolamine, 1 patch, transdermal, q72h  sennosides-docusate sodium, 2 tablet, oral, BID  sodium phosphate, 15 mmol, intravenous, Once     PRN medications: albuterol, benzocaine-menthol, calcium gluconate, calcium gluconate, cyclobenzaprine, dextrose, dextrose,  diphenhydrAMINE, glucagon, glucagon, hydrALAZINE, HYDROmorphone, labetaloL, magnesium sulfate, magnesium sulfate, naloxone, ondansetron **OR** ondansetron, oxyCODONE, oxyCODONE, oxyCODONE, potassium chloride CR **OR** potassium chloride, potassium chloride CR **OR** potassium chloride, potassium chloride, promethazine, promethazine **OR** promethazine         Lab Results  Lab Results   Component Value Date    WBC 24.0 (H) 04/11/2024    HGB 11.0 (L) 04/11/2024    HCT 30.8 (L) 04/11/2024    MCV 84 04/11/2024     04/11/2024     Lab Results   Component Value Date    GLUCOSE 121 (H) 04/11/2024    CALCIUM 8.2 (L) 04/11/2024     04/11/2024    K 4.2 04/11/2024    CO2 20 (L) 04/11/2024     04/11/2024    BUN 9 04/11/2024    CREATININE 0.50 04/11/2024       Imaging Results  CT head wo IV contrast   Final Result   1. Postsurgical changes consistent with bifrontal frontal craniotomy   and right frontal approach external ventricular drain placement.   Postsurgical changes as described in detail above.   2. Interval development of mild right-to-left midline shift,   measuring about 0.4 cm of the level of the septum pellucidum.        I personally reviewed the images/study and I agree with the findings   as stated by Jimmy Anguiano MD.        MACRO:   None        Signed by: Cris Linder 4/10/2024 7:04 AM   Dictation workstation:   [id number]      CT head wo IV contrast volumetric surgical planning   Final Result   1. Redemonstration of a well-circumscribed lesion of the superior   aspect of the 3rd ventricle, which can be correlated to the findings   seen on MRI brain 04/05/2024.   2. No evidence of acute cortical infarct or intracranial hemorrhage.        I personally reviewed the images/study and I agree with the findings   as stated by Jimmy Anguiano MD.        MACRO:   None        Signed by: Cris Linder 4/9/2024 7:29 AM   Dictation workstation:   [id number]      CT chest abdomen pelvis w IV  contrast   Final Result   CHEST:   1.  No evidence of primary malignancy/metastatic disease in the chest.        ABDOMEN-PELVIS:   1.  No evidence of primary malignancy/metastatic disease in the   abdomen/pelvis.        I personally reviewed the images/study and I agree with the findings   as stated by Dr. Herminio Love. This study was interpreted at   [place], [place].        MACRO:   None        Signed by: Stevenson Dumont 4/5/2024 5:14 PM   Dictation workstation:   [id number]      XR chest 1 view   Final Result   Bibasilar atelectasis without evidence of acute cardiopulmonary   process.        I personally reviewed the images/study and I agree with Madelyn Paz DO's (radiology resident) findings as stated. This study   was interpreted at [place], [place].        MACRO:   None        Signed by: David Torres 4/5/2024 8:15 AM   Dictation workstation:   [id number]      MR brain w and wo IV contrast   Final Result   MRI Brain:        Isolated, circumscribed T1/T2 hyperintense lesion in the superior   aspect of the 3rd ventricle measuring up to 2.9 x 2.8 x 2.9 cm.   Leading differential consideration is a colloid cyst. A focus of   blooming artifact along the superior margin of the cyst is favored to   reflect dystrophic calcification. Neurocysticercosis could   potentially have a similar imaging appearance. Lack of fat   suppression makes dermoid less likely. No evidence of obstructive   hydrocephalus at this time. No evidence of acute intracranial   hemorrhage.        MRA:        No evidence of source vessel arterial occlusion, aneurysm, or   vascular malformation.        I personally reviewed the images/study and I agree with the findings   as stated by resident physician Dr. Sebastián Redmond.        MACRO:   None        Signed by: Yuriy Manning 4/5/2024 8:31 AM   Dictation workstation:   [id number]      MR angio  head wo IV contrast   Final Result   MRI Brain:        Isolated, circumscribed T1/T2 hyperintense lesion in the superior   aspect of the 3rd ventricle measuring up to 2.9 x 2.8 x 2.9 cm.   Leading differential consideration is a colloid cyst. A focus of   blooming artifact along the superior margin of the cyst is favored to   reflect dystrophic calcification. Neurocysticercosis could   potentially have a similar imaging appearance. Lack of fat   suppression makes dermoid less likely. No evidence of obstructive   hydrocephalus at this time. No evidence of acute intracranial   hemorrhage.        MRA:        No evidence of source vessel arterial occlusion, aneurysm, or   vascular malformation.        I personally reviewed the images/study and I agree with the findings   as stated by resident physician Dr. Sebastián Redmond.        MACRO:   None        Signed by: Yuriy Manning 4/5/2024 8:31 AM   Dictation workstation:   [id number]            Assessment/Plan   NEURO:  #Ventricular mass s/p R crani for colloid cyst fenestration, EVD placement  Assessment:  - Neurologically stable POD 2  - EVD at 10; 24h output 186, shift 90  Plan:  - NSU  - Q1H neuro checks  - Con't EVD at 10 until 4/12 per neurosurgery  - Con't ancef while EVD in place  - Con't Dex 4mg q8 with plan to taper off over 1 week (3q8 tomorrow)  - Pain: acetaminophen, oxycodone, hydromorphone PRN  - Nausea: ondansetron, phenergan, scopolamine patch  - PT/OT - low intensity, con't to mobilize    CARDIOVASCULAR:  #HTN  Assessment:  - Stable, at goal   Plan:  - Continue to monitor on telemetry  - On home amlodipine 10mg daily, held losartan 25mg  - Goal SBP < 160 - Nicardipine, Hydralazine and Labetalol PRN    RESPIRATORY:  #VERN on CPAP  Assessment:  - Stable on room air   Plan:  - Continuous pulse oximetry   - O2 PRN to maintain SpO2 > 94%, wean as tolerated  - Incentive spirometry while awake     RENAL/:  #No active issues  Assessment:  Results from last 72  hours   Lab Units 04/11/24  0312 04/10/24  0317   BUN mg/dL 9 7   CREATININE mg/dL 0.50 0.65       Plan:  - Monitor with daily RFP    FEN/GI:  #Hyponatremia  Assessment:  - Had BM 4/10  - Na improved from 136 from 131  Plan:  - Monitor and replace electrolytes per protocol  - Daily RFP  - Dc IVF d/t improved PO intake  - Diet: regular  - Bowel Regimen: Docusate-Senna, Miralax    ENDOCRINE:  #No active issues  Assessment:  Results from last 7 days   Lab Units 04/11/24  0312 04/10/24  1949 04/10/24  1714 04/10/24  1137 04/10/24  0730 04/10/24  03124   POCT GLUCOSE mg/dL  --  156* 163* 177* 155*  --  146*   GLUCOSE mg/dL 121*  --   --   --   --  147*  --       Plan:  - On dex, PPI, SSI     HEMATOLOGY:  #anemia  Assessment:  - Baseline Hgb: 13.1  - Baseline Plts: 276  - Stable H/H  Results from last 7 days   Lab Units 04/11/24  0312 04/10/24  0317   HEMOGLOBIN g/dL 11.0* 11.8*   HEMATOCRIT % 30.8* 34.3*   PLATELETS AUTO x10*3/uL 261 280     Plan:  - Continue to monitor with daily CBC    INFECTIOUS DISEASE:  #Leukocytosis  Assessment:  Afebrile, no active s/sx of infection  Results from last 7 days   Lab Units 04/11/24  0312 04/10/24  0317   WBC AUTO x10*3/uL 24.0* 14.5*     - Temp (24hrs), Av.4 °C (97.6 °F), Min:36 °C (96.8 °F), Max:36.7 °C (98.1 °F)  Plan:  - Continue to monitor for s/sx of infection and daily CBC  - Pan culture for temperature > 38.4 C    MUSCULOSKELETAL:  - No acute issues    SKIN:  - EVD and cranial incision c/d/I   - Turns and skin care per NSU protocol    ACCESS:  - PIV x 3    PROPHYLAXIS:  - DVT/VTE: SCDs, SQ heparin   - GI: Protonix    RESTRAINTS:  Not indicated/Patient does not meet criteria for restraints    Chino Dueñas MD  Neuroscience Intensive Care    Plan of care to be discussed with neurocritical care attending

## 2024-04-12 LAB
ALBUMIN SERPL BCP-MCNC: 2.9 G/DL (ref 3.4–5)
ANION GAP SERPL CALC-SCNC: 12 MMOL/L (ref 10–20)
BASOPHILS # BLD AUTO: 0.02 X10*3/UL (ref 0–0.1)
BASOPHILS NFR BLD AUTO: 0.1 %
BUN SERPL-MCNC: 11 MG/DL (ref 6–23)
CA-I BLD-SCNC: 1.16 MMOL/L (ref 1.1–1.33)
CALCIUM SERPL-MCNC: 8.5 MG/DL (ref 8.6–10.6)
CHLORIDE SERPL-SCNC: 104 MMOL/L (ref 98–107)
CO2 SERPL-SCNC: 24 MMOL/L (ref 21–32)
CREAT SERPL-MCNC: 0.59 MG/DL (ref 0.5–1.05)
EGFRCR SERPLBLD CKD-EPI 2021: >90 ML/MIN/1.73M*2
EOSINOPHIL # BLD AUTO: 0.01 X10*3/UL (ref 0–0.7)
EOSINOPHIL NFR BLD AUTO: 0.1 %
ERYTHROCYTE [DISTWIDTH] IN BLOOD BY AUTOMATED COUNT: 13.5 % (ref 11.5–14.5)
GLUCOSE BLD MANUAL STRIP-MCNC: 106 MG/DL (ref 74–99)
GLUCOSE BLD MANUAL STRIP-MCNC: 115 MG/DL (ref 74–99)
GLUCOSE BLD MANUAL STRIP-MCNC: 130 MG/DL (ref 74–99)
GLUCOSE BLD MANUAL STRIP-MCNC: 137 MG/DL (ref 74–99)
GLUCOSE SERPL-MCNC: 142 MG/DL (ref 74–99)
HCT VFR BLD AUTO: 31 % (ref 36–46)
HGB BLD-MCNC: 10.5 G/DL (ref 12–16)
IMM GRANULOCYTES # BLD AUTO: 0.18 X10*3/UL (ref 0–0.7)
IMM GRANULOCYTES NFR BLD AUTO: 0.9 % (ref 0–0.9)
LYMPHOCYTES # BLD AUTO: 1.46 X10*3/UL (ref 1.2–4.8)
LYMPHOCYTES NFR BLD AUTO: 7.7 %
MAGNESIUM SERPL-MCNC: 1.95 MG/DL (ref 1.6–2.4)
MCH RBC QN AUTO: 29.1 PG (ref 26–34)
MCHC RBC AUTO-ENTMCNC: 33.9 G/DL (ref 32–36)
MCV RBC AUTO: 86 FL (ref 80–100)
MONOCYTES # BLD AUTO: 0.73 X10*3/UL (ref 0.1–1)
MONOCYTES NFR BLD AUTO: 3.9 %
NEUTROPHILS # BLD AUTO: 16.55 X10*3/UL (ref 1.2–7.7)
NEUTROPHILS NFR BLD AUTO: 87.3 %
NRBC BLD-RTO: 0 /100 WBCS (ref 0–0)
PHOSPHATE SERPL-MCNC: 2 MG/DL (ref 2.5–4.9)
PLATELET # BLD AUTO: 245 X10*3/UL (ref 150–450)
POTASSIUM SERPL-SCNC: 4 MMOL/L (ref 3.5–5.3)
RBC # BLD AUTO: 3.61 X10*6/UL (ref 4–5.2)
SODIUM SERPL-SCNC: 136 MMOL/L (ref 136–145)
WBC # BLD AUTO: 19 X10*3/UL (ref 4.4–11.3)

## 2024-04-12 PROCEDURE — 82947 ASSAY GLUCOSE BLOOD QUANT: CPT

## 2024-04-12 PROCEDURE — 2500000004 HC RX 250 GENERAL PHARMACY W/ HCPCS (ALT 636 FOR OP/ED)

## 2024-04-12 PROCEDURE — 82330 ASSAY OF CALCIUM: CPT

## 2024-04-12 PROCEDURE — 2500000004 HC RX 250 GENERAL PHARMACY W/ HCPCS (ALT 636 FOR OP/ED): Performed by: STUDENT IN AN ORGANIZED HEALTH CARE EDUCATION/TRAINING PROGRAM

## 2024-04-12 PROCEDURE — 99291 CRITICAL CARE FIRST HOUR: CPT

## 2024-04-12 PROCEDURE — 2500000001 HC RX 250 WO HCPCS SELF ADMINISTERED DRUGS (ALT 637 FOR MEDICARE OP)

## 2024-04-12 PROCEDURE — 2500000001 HC RX 250 WO HCPCS SELF ADMINISTERED DRUGS (ALT 637 FOR MEDICARE OP): Performed by: STUDENT IN AN ORGANIZED HEALTH CARE EDUCATION/TRAINING PROGRAM

## 2024-04-12 PROCEDURE — 2500000005 HC RX 250 GENERAL PHARMACY W/O HCPCS: Performed by: STUDENT IN AN ORGANIZED HEALTH CARE EDUCATION/TRAINING PROGRAM

## 2024-04-12 PROCEDURE — 80069 RENAL FUNCTION PANEL: CPT

## 2024-04-12 PROCEDURE — 83735 ASSAY OF MAGNESIUM: CPT

## 2024-04-12 PROCEDURE — 2020000001 HC ICU ROOM DAILY

## 2024-04-12 PROCEDURE — 85025 COMPLETE CBC W/AUTO DIFF WBC: CPT

## 2024-04-12 PROCEDURE — 36415 COLL VENOUS BLD VENIPUNCTURE: CPT

## 2024-04-12 PROCEDURE — 2500000005 HC RX 250 GENERAL PHARMACY W/O HCPCS

## 2024-04-12 RX ORDER — ACETAMINOPHEN 500 MG
10 TABLET ORAL NIGHTLY PRN
Status: DISCONTINUED | OUTPATIENT
Start: 2024-04-12 | End: 2024-04-17 | Stop reason: HOSPADM

## 2024-04-12 RX ADMIN — SENNOSIDES AND DOCUSATE SODIUM 2 TABLET: 8.6; 5 TABLET ORAL at 08:13

## 2024-04-12 RX ADMIN — CEFAZOLIN SODIUM 2 G: 2 INJECTION, SOLUTION INTRAVENOUS at 18:38

## 2024-04-12 RX ADMIN — OXYCODONE HYDROCHLORIDE 5 MG: 5 TABLET ORAL at 21:18

## 2024-04-12 RX ADMIN — HEPARIN SODIUM 5000 UNITS: 5000 INJECTION INTRAVENOUS; SUBCUTANEOUS at 21:20

## 2024-04-12 RX ADMIN — HEPARIN SODIUM 5000 UNITS: 5000 INJECTION INTRAVENOUS; SUBCUTANEOUS at 05:48

## 2024-04-12 RX ADMIN — CARBOXYMETHYLCELLULOSE SODIUM 1 DROP: 5 SOLUTION/ DROPS OPHTHALMIC at 05:49

## 2024-04-12 RX ADMIN — ERYTHROMYCIN 1 CM: 5 OINTMENT OPHTHALMIC at 20:53

## 2024-04-12 RX ADMIN — OXYCODONE HYDROCHLORIDE 10 MG: 5 TABLET ORAL at 13:18

## 2024-04-12 RX ADMIN — CARBOXYMETHYLCELLULOSE SODIUM 1 DROP: 5 SOLUTION/ DROPS OPHTHALMIC at 17:15

## 2024-04-12 RX ADMIN — CARBOXYMETHYLCELLULOSE SODIUM 1 DROP: 5 SOLUTION/ DROPS OPHTHALMIC at 20:51

## 2024-04-12 RX ADMIN — PANTOPRAZOLE SODIUM 40 MG: 40 TABLET, DELAYED RELEASE ORAL at 05:49

## 2024-04-12 RX ADMIN — OXYCODONE HYDROCHLORIDE 5 MG: 5 TABLET ORAL at 17:15

## 2024-04-12 RX ADMIN — SODIUM PHOSPHATE, MONOBASIC, MONOHYDRATE AND SODIUM PHOSPHATE, DIBASIC, ANHYDROUS 15 MMOL: 142; 276 INJECTION, SOLUTION INTRAVENOUS at 05:49

## 2024-04-12 RX ADMIN — DEXAMETHASONE SODIUM PHOSPHATE 3.2 MG: 4 INJECTION, SOLUTION INTRAMUSCULAR; INTRAVENOUS at 15:17

## 2024-04-12 RX ADMIN — OXYCODONE HYDROCHLORIDE 5 MG: 5 TABLET ORAL at 08:12

## 2024-04-12 RX ADMIN — Medication 10 MG: at 20:51

## 2024-04-12 RX ADMIN — POLYETHYLENE GLYCOL 3350 17 G: 17 POWDER, FOR SOLUTION ORAL at 08:13

## 2024-04-12 RX ADMIN — AMLODIPINE BESYLATE 10 MG: 10 TABLET ORAL at 08:12

## 2024-04-12 RX ADMIN — ACETAMINOPHEN 650 MG: 325 TABLET ORAL at 18:38

## 2024-04-12 RX ADMIN — DEXAMETHASONE SODIUM PHOSPHATE 4 MG: 4 INJECTION, SOLUTION INTRAMUSCULAR; INTRAVENOUS at 01:39

## 2024-04-12 RX ADMIN — DEXAMETHASONE SODIUM PHOSPHATE 4 MG: 4 INJECTION, SOLUTION INTRAMUSCULAR; INTRAVENOUS at 05:49

## 2024-04-12 RX ADMIN — HEPARIN SODIUM 5000 UNITS: 5000 INJECTION INTRAVENOUS; SUBCUTANEOUS at 01:40

## 2024-04-12 RX ADMIN — LOSARTAN POTASSIUM 25 MG: 25 TABLET, FILM COATED ORAL at 09:34

## 2024-04-12 RX ADMIN — CEFAZOLIN SODIUM 2 G: 2 INJECTION, SOLUTION INTRAVENOUS at 01:40

## 2024-04-12 RX ADMIN — CARBOXYMETHYLCELLULOSE SODIUM 1 DROP: 5 SOLUTION/ DROPS OPHTHALMIC at 13:18

## 2024-04-12 RX ADMIN — ACETAMINOPHEN 650 MG: 325 TABLET ORAL at 05:49

## 2024-04-12 RX ADMIN — CEFAZOLIN SODIUM 2 G: 2 INJECTION, SOLUTION INTRAVENOUS at 09:34

## 2024-04-12 RX ADMIN — ACETAMINOPHEN 650 MG: 325 TABLET ORAL at 13:18

## 2024-04-12 RX ADMIN — HEPARIN SODIUM 5000 UNITS: 5000 INJECTION INTRAVENOUS; SUBCUTANEOUS at 13:18

## 2024-04-12 RX ADMIN — ACETAMINOPHEN 650 MG: 325 TABLET ORAL at 01:40

## 2024-04-12 ASSESSMENT — COGNITIVE AND FUNCTIONAL STATUS - GENERAL
WALKING IN HOSPITAL ROOM: A LITTLE
STANDING UP FROM CHAIR USING ARMS: A LITTLE
DRESSING REGULAR UPPER BODY CLOTHING: A LITTLE
CLIMB 3 TO 5 STEPS WITH RAILING: A LOT
DAILY ACTIVITIY SCORE: 19
HELP NEEDED FOR BATHING: A LITTLE
MOVING TO AND FROM BED TO CHAIR: A LITTLE
DRESSING REGULAR LOWER BODY CLOTHING: A LITTLE
TOILETING: A LITTLE
MOBILITY SCORE: 19
PERSONAL GROOMING: A LITTLE

## 2024-04-12 ASSESSMENT — PAIN SCALES - GENERAL
PAINLEVEL_OUTOF10: 6
PAINLEVEL_OUTOF10: 7
PAINLEVEL_OUTOF10: 0 - NO PAIN
PAINLEVEL_OUTOF10: 3
PAINLEVEL_OUTOF10: 0 - NO PAIN
PAINLEVEL_OUTOF10: 2
PAINLEVEL_OUTOF10: 6
PAINLEVEL_OUTOF10: 2

## 2024-04-12 ASSESSMENT — PAIN DESCRIPTION - LOCATION: LOCATION: HEAD

## 2024-04-12 ASSESSMENT — PAIN - FUNCTIONAL ASSESSMENT
PAIN_FUNCTIONAL_ASSESSMENT: 0-10

## 2024-04-12 ASSESSMENT — PAIN DESCRIPTION - DESCRIPTORS
DESCRIPTORS: ACHING

## 2024-04-12 NOTE — CARE PLAN
The clinical goals for the shift include remaining hemodynamically and neurologically stable until end of shift. Patients goal to keep pain under control and get some rest throughout shift.      Problem: Pain - Adult  Goal: Verbalizes/displays adequate comfort level or baseline comfort level  Outcome: Progressing     Problem: Safety - Adult  Goal: Free from fall injury  Outcome: Progressing     Problem: Discharge Planning  Goal: Discharge to home or other facility with appropriate resources  Outcome: Progressing     Problem: Chronic Conditions and Co-morbidities  Goal: Patient's chronic conditions and co-morbidity symptoms are monitored and maintained or improved  Outcome: Progressing     Problem: Skin  Goal: Prevent/minimize sheer/friction injuries  Outcome: Progressing  Goal: Promote skin healing  Outcome: Progressing

## 2024-04-12 NOTE — PROGRESS NOTES
Physical Therapy                 Therapy Communication Note    Patient Name: Radha Holland  MRN: [id number]  Today's Date: 4/12/2024     Discipline: Physical Therapy    Missed Visit Reason: Missed Visit Reason:  (Pt declines 2/2 fatigue. Plan to re-attempt as available and appropriate.)    Missed Time: Attempt

## 2024-04-12 NOTE — PROGRESS NOTES
"Radha Holland is a 48 y.o. female on day 7 of admission presenting with Brain mass.    Subjective   No acute events overnight    Objective     Physical Exam  Aox3  Fcx4 5/5  EVD site cdi  Incision c/d/i    Last Recorded Vitals  Blood pressure 130/83, pulse 86, temperature 36.2 °C (97.2 °F), temperature source Temporal, resp. rate 15, height 1.626 m (5' 4.02\"), weight 104 kg (228 lb 6.3 oz), SpO2 100%.  Intake/Output last 3 Shifts:  I/O last 3 completed shifts:  In: 3501.7 (33.8 mL/kg) [P.O.:920; I.V.:2231.7 (21.5 mL/kg); IV Piggyback:350]  Out: 3470 (33.5 mL/kg) [Urine:3150 (0.8 mL/kg/hr); Drains:320]  Weight: 103.6 kg     Relevant Results           This patient currently has cardiac telemetry ordered; if you would like to modify or discontinue the telemetry order, click here to go to the orders activity to modify/discontinue the order.                 Assessment/Plan   Principal Problem:    Brain mass  Active Problems:    HTN (hypertension)    VERN (obstructive sleep apnea)    Asthma (HHS-HCC)    48 F h/o arthritis, HTN, VERN (on CPAP), prior ICH (?), p/w 1 year HA, 4d worsening HA, n/v CTH 3cm intraventricular mass originating from septum, MRI Brain cystic lateral vent mass, MRA neg, 4/5 CT CAP neg    4/9 s/p R crani for colloid cyst fenestration, RF EVD placement. CTH POC, cath in posn    -NSU  -EVD at 10 -clamp today  -dex taper to off over 1 week, 3q8 today  -ancef while EVD is in   -PTOT-OK to work with and mobilize with EVD in  -SCDs, SQH      Diana Carr MD      "

## 2024-04-12 NOTE — PROGRESS NOTES
[facility]  NEUROSCIENCE INTENSIVE CARE UNIT  DAILY PROGRESS NOTE    Patient Name: Radha Holland     MRN: [id number]     Admit Date: 2024     : 1975 AGE: 48 y.o. GENDER: female      Subjective   Patient denies headache or incisional pain at this time.     Objective   Vitals 24 hour ranges:  Heart Rate:  []   Temp:  [36 °C (96.8 °F)-36.9 °C (98.4 °F)]   Resp:  [13-24]   BP: (106-140)/(66-93)   SpO2:  [99 %-100 %]    Intake/Output for last 24 hours:    Intake/Output Summary (Last 24 hours) at 2024 0705  Last data filed at 2024 0600  Gross per 24 hour   Intake 800 ml   Output 933 ml   Net -133 ml     Physical Exam:  NEURO:  - Alert, oriented x4, follows commands  - Following commands, 5/5 strength x 4 extremities  CV:  - RRR on telemetry, NSR  RESP:  - Regular, unlabored on room air  :  - external catheter in place  GI:  - Abdomen NT/ND, soft  SKIN:  - cranial incision c/d/i    Medications  Scheduled: PRN: Continuous:   acetaminophen, 650 mg, oral, q6h  amLODIPine, 10 mg, oral, Daily  ceFAZolin, 2 g, intravenous, q8h  dexAMETHasone, 3.2 mg, intravenous, q8h  erythromycin, 1 cm, Both Eyes, Nightly  heparin (porcine), 5,000 Units, subcutaneous, q8h  insulin lispro, 0-5 Units, subcutaneous, TID with meals  losartan, 25 mg, oral, Daily  artificial tears, 1 drop, Both Eyes, 4x daily  pantoprazole, 40 mg, oral, Daily before breakfast   Or  pantoprazole, 40 mg, intravenous, Daily before breakfast  polyethylene glycol, 17 g, oral, Daily  scopolamine, 1 patch, transdermal, q72h  sennosides-docusate sodium, 2 tablet, oral, BID  sodium phosphate, 15 mmol, intravenous, Once     PRN medications: albuterol, benzocaine-menthol, calcium gluconate, calcium gluconate, cyclobenzaprine, dextrose, dextrose, diphenhydrAMINE, glucagon, glucagon, hydrALAZINE, HYDROmorphone, labetaloL, magnesium sulfate, magnesium sulfate, naloxone, ondansetron **OR** ondansetron, oxyCODONE,  oxyCODONE, oxyCODONE, potassium chloride CR **OR** potassium chloride, potassium chloride CR **OR** potassium chloride, potassium chloride, promethazine, promethazine **OR** promethazine         Lab Results  Lab Results   Component Value Date    WBC 19.0 (H) 04/12/2024    HGB 10.5 (L) 04/12/2024    HCT 31.0 (L) 04/12/2024    MCV 86 04/12/2024     04/12/2024     Lab Results   Component Value Date    GLUCOSE 142 (H) 04/12/2024    CALCIUM 8.5 (L) 04/12/2024     04/12/2024    K 4.0 04/12/2024    CO2 24 04/12/2024     04/12/2024    BUN 11 04/12/2024    CREATININE 0.59 04/12/2024       Imaging Results  CT head wo IV contrast   Final Result   1. Postsurgical changes consistent with bifrontal frontal craniotomy   and right frontal approach external ventricular drain placement.   Postsurgical changes as described in detail above.   2. Interval development of mild right-to-left midline shift,   measuring about 0.4 cm of the level of the septum pellucidum.        I personally reviewed the images/study and I agree with the findings   as stated by Jimmy Anguiano MD.        MACRO:   None        Signed by: Cris Linder 4/10/2024 7:04 AM   Dictation workstation:   [id number]      CT head wo IV contrast volumetric surgical planning   Final Result   1. Redemonstration of a well-circumscribed lesion of the superior   aspect of the 3rd ventricle, which can be correlated to the findings   seen on MRI brain 04/05/2024.   2. No evidence of acute cortical infarct or intracranial hemorrhage.        I personally reviewed the images/study and I agree with the findings   as stated by Jimmy Anguiano MD.        MACRO:   None        Signed by: Cris Linder 4/9/2024 7:29 AM   Dictation workstation:   [id number]      CT chest abdomen pelvis w IV contrast   Final Result   CHEST:   1.  No evidence of primary malignancy/metastatic disease in the chest.        ABDOMEN-PELVIS:   1.  No evidence of primary  malignancy/metastatic disease in the   abdomen/pelvis.        I personally reviewed the images/study and I agree with the findings   as stated by Dr. Herminio Love. This study was interpreted at   [place], [place].        MACRO:   None        Signed by: Stevenson Dumont 4/5/2024 5:14 PM   Dictation workstation:   [id number]      XR chest 1 view   Final Result   Bibasilar atelectasis without evidence of acute cardiopulmonary   process.        I personally reviewed the images/study and I agree with Madelyn Paz DO's (radiology resident) findings as stated. This study   was interpreted at [place], [place].        MACRO:   None        Signed by: David Torres 4/5/2024 8:15 AM   Dictation workstation:   [id number]      MR brain w and wo IV contrast   Final Result   MRI Brain:        Isolated, circumscribed T1/T2 hyperintense lesion in the superior   aspect of the 3rd ventricle measuring up to 2.9 x 2.8 x 2.9 cm.   Leading differential consideration is a colloid cyst. A focus of   blooming artifact along the superior margin of the cyst is favored to   reflect dystrophic calcification. Neurocysticercosis could   potentially have a similar imaging appearance. Lack of fat   suppression makes dermoid less likely. No evidence of obstructive   hydrocephalus at this time. No evidence of acute intracranial   hemorrhage.        MRA:        No evidence of source vessel arterial occlusion, aneurysm, or   vascular malformation.        I personally reviewed the images/study and I agree with the findings   as stated by resident physician Dr. Sebastián Redmond.        MACRO:   None        Signed by: Yuriy Manning 4/5/2024 8:31 AM   Dictation workstation:   [id number]      MR angio head wo IV contrast   Final Result   MRI Brain:        Isolated, circumscribed T1/T2 hyperintense lesion in the superior   aspect of the 3rd ventricle  measuring up to 2.9 x 2.8 x 2.9 cm.   Leading differential consideration is a colloid cyst. A focus of   blooming artifact along the superior margin of the cyst is favored to   reflect dystrophic calcification. Neurocysticercosis could   potentially have a similar imaging appearance. Lack of fat   suppression makes dermoid less likely. No evidence of obstructive   hydrocephalus at this time. No evidence of acute intracranial   hemorrhage.        MRA:        No evidence of source vessel arterial occlusion, aneurysm, or   vascular malformation.        I personally reviewed the images/study and I agree with the findings   as stated by resident physician Dr. Sebastián Redmond.        MACRO:   None        Signed by: Yuriy Manning 4/5/2024 8:31 AM   Dictation workstation:   [id number]            Assessment/Plan   NEURO:  #Ventricular mass s/p R crani for colloid cyst fenestration, EVD placement  Assessment:  - Neurologically stable POD 3  - EVD clamped; 24h output 226, shift 106  Plan:  - NSU  - Q1H neuro checks  - EVD clamped. Monitor ICPs, site for leakage, clinical exam  - Con't ancef while EVD in place  - Decrease dex to 3mg q8 with plan to taper off over 1 week   - Pain: acetaminophen, oxycodone, hydromorphone PRN  - Nausea: ondansetron, phenergan, scopolamine patch  - PT/OT - low intensity, con't to mobilize    CARDIOVASCULAR:  #HTN  Assessment:  - Stable, at goal   Plan:  - Continue to monitor on telemetry  - On home amlodipine 10mg daily  - Goal SBP < 160 - Nicardipine, Hydralazine and Labetalol PRN    RESPIRATORY:  #VERN on CPAP  Assessment:  - Stable on room air   Plan:  - Continuous pulse oximetry   - O2 PRN to maintain SpO2 > 94%, wean as tolerated  - Incentive spirometry while awake     RENAL/:  #No active issues  Assessment:  Results from last 72 hours   Lab Units 04/12/24  0203 04/11/24  0312   BUN mg/dL 11 9   CREATININE mg/dL 0.59 0.50       Plan:  - Monitor with daily RFP    FEN/GI:  #Hyponatremia  (resolved)  Assessment:  - Had BM 4/10  - Stable Na: 136 <- 136  Plan:  - Monitor and replace electrolytes per protocol  - Daily RFP  - Diet: regular  - Bowel Regimen: Docusate-Senna, Miralax    ENDOCRINE:  #No active issues  Assessment:  Results from last 7 days   Lab Units 24  0203 24  1551 24  1139 24  0729 24  0312 04/10/24  1949 04/10/24  1714 04/10/24  1137   POCT GLUCOSE mg/dL  --  138* 113* 121*  --  156* 163* 177*   GLUCOSE mg/dL 142*  --   --   --  121*  --   --   --       Plan:  - On dex, PPI, SSI     HEMATOLOGY:  #anemia  Assessment:  - Baseline Hgb: 13.1  - Baseline Plts: 276  - Stable H/H  Results from last 7 days   Lab Units 24  0203 24  0312   HEMOGLOBIN g/dL 10.5* 11.0*   HEMATOCRIT % 31.0* 30.8*   PLATELETS AUTO x10*3/uL 245 261     Plan:  - Continue to monitor with daily CBC    INFECTIOUS DISEASE:  #Leukocytosis  Assessment:  Afebrile, no active s/sx of infection  Results from last 7 days   Lab Units 24  0203 24  0312   WBC AUTO x10*3/uL 19.0* 24.0*     - Temp (24hrs), Av.3 °C (97.3 °F), Min:36 °C (96.8 °F), Max:36.9 °C (98.4 °F)  Plan:  - Continue to monitor for s/sx of infection and daily CBC  - Pan culture for temperature > 38.4 C    MUSCULOSKELETAL:  - No acute issues    SKIN:  - EVD and cranial incision c/d/I   - Turns and skin care per NSU protocol    ACCESS:  - PIV x 3    PROPHYLAXIS:  - DVT/VTE: SCDs, SQ heparin   - GI: Protonix    RESTRAINTS:  Not indicated/Patient does not meet criteria for restraints    Chino Dueñas MD  Neuroscience Intensive Care    Plan of care to be discussed with neurocritical care attending

## 2024-04-13 ENCOUNTER — APPOINTMENT (OUTPATIENT)
Dept: RADIOLOGY | Facility: HOSPITAL | Age: 49
End: 2024-04-13
Payer: COMMERCIAL

## 2024-04-13 LAB
ALBUMIN SERPL BCP-MCNC: 2.8 G/DL (ref 3.4–5)
ANION GAP SERPL CALC-SCNC: 10 MMOL/L (ref 10–20)
BASOPHILS # BLD AUTO: 0.01 X10*3/UL (ref 0–0.1)
BASOPHILS NFR BLD AUTO: 0.1 %
BUN SERPL-MCNC: 12 MG/DL (ref 6–23)
CA-I BLD-SCNC: 1.18 MMOL/L (ref 1.1–1.33)
CALCIUM SERPL-MCNC: 8.2 MG/DL (ref 8.6–10.6)
CHLORIDE SERPL-SCNC: 104 MMOL/L (ref 98–107)
CO2 SERPL-SCNC: 26 MMOL/L (ref 21–32)
CREAT SERPL-MCNC: 0.58 MG/DL (ref 0.5–1.05)
EGFRCR SERPLBLD CKD-EPI 2021: >90 ML/MIN/1.73M*2
EOSINOPHIL # BLD AUTO: 0.01 X10*3/UL (ref 0–0.7)
EOSINOPHIL NFR BLD AUTO: 0.1 %
ERYTHROCYTE [DISTWIDTH] IN BLOOD BY AUTOMATED COUNT: 13.6 % (ref 11.5–14.5)
GLUCOSE BLD MANUAL STRIP-MCNC: 109 MG/DL (ref 74–99)
GLUCOSE BLD MANUAL STRIP-MCNC: 126 MG/DL (ref 74–99)
GLUCOSE BLD MANUAL STRIP-MCNC: 127 MG/DL (ref 74–99)
GLUCOSE SERPL-MCNC: 96 MG/DL (ref 74–99)
HCT VFR BLD AUTO: 30.3 % (ref 36–46)
HGB BLD-MCNC: 10.5 G/DL (ref 12–16)
IMM GRANULOCYTES # BLD AUTO: 0.1 X10*3/UL (ref 0–0.7)
IMM GRANULOCYTES NFR BLD AUTO: 0.7 % (ref 0–0.9)
LYMPHOCYTES # BLD AUTO: 1.83 X10*3/UL (ref 1.2–4.8)
LYMPHOCYTES NFR BLD AUTO: 13.5 %
MAGNESIUM SERPL-MCNC: 1.94 MG/DL (ref 1.6–2.4)
MCH RBC QN AUTO: 30 PG (ref 26–34)
MCHC RBC AUTO-ENTMCNC: 34.7 G/DL (ref 32–36)
MCV RBC AUTO: 87 FL (ref 80–100)
MONOCYTES # BLD AUTO: 0.88 X10*3/UL (ref 0.1–1)
MONOCYTES NFR BLD AUTO: 6.5 %
NEUTROPHILS # BLD AUTO: 10.7 X10*3/UL (ref 1.2–7.7)
NEUTROPHILS NFR BLD AUTO: 79.1 %
NRBC BLD-RTO: 0 /100 WBCS (ref 0–0)
PHOSPHATE SERPL-MCNC: 2.5 MG/DL (ref 2.5–4.9)
PLATELET # BLD AUTO: 272 X10*3/UL (ref 150–450)
POTASSIUM SERPL-SCNC: 4.4 MMOL/L (ref 3.5–5.3)
RBC # BLD AUTO: 3.5 X10*6/UL (ref 4–5.2)
SODIUM SERPL-SCNC: 136 MMOL/L (ref 136–145)
WBC # BLD AUTO: 13.5 X10*3/UL (ref 4.4–11.3)

## 2024-04-13 PROCEDURE — 2500000004 HC RX 250 GENERAL PHARMACY W/ HCPCS (ALT 636 FOR OP/ED): Performed by: STUDENT IN AN ORGANIZED HEALTH CARE EDUCATION/TRAINING PROGRAM

## 2024-04-13 PROCEDURE — 2500000005 HC RX 250 GENERAL PHARMACY W/O HCPCS: Performed by: STUDENT IN AN ORGANIZED HEALTH CARE EDUCATION/TRAINING PROGRAM

## 2024-04-13 PROCEDURE — 2500000001 HC RX 250 WO HCPCS SELF ADMINISTERED DRUGS (ALT 637 FOR MEDICARE OP)

## 2024-04-13 PROCEDURE — 70450 CT HEAD/BRAIN W/O DYE: CPT

## 2024-04-13 PROCEDURE — 83735 ASSAY OF MAGNESIUM: CPT

## 2024-04-13 PROCEDURE — 82947 ASSAY GLUCOSE BLOOD QUANT: CPT

## 2024-04-13 PROCEDURE — 82330 ASSAY OF CALCIUM: CPT

## 2024-04-13 PROCEDURE — 70450 CT HEAD/BRAIN W/O DYE: CPT | Performed by: RADIOLOGY

## 2024-04-13 PROCEDURE — 2020000001 HC ICU ROOM DAILY

## 2024-04-13 PROCEDURE — 36415 COLL VENOUS BLD VENIPUNCTURE: CPT

## 2024-04-13 PROCEDURE — 80069 RENAL FUNCTION PANEL: CPT

## 2024-04-13 PROCEDURE — 2500000004 HC RX 250 GENERAL PHARMACY W/ HCPCS (ALT 636 FOR OP/ED): Performed by: REGISTERED NURSE

## 2024-04-13 PROCEDURE — 2500000001 HC RX 250 WO HCPCS SELF ADMINISTERED DRUGS (ALT 637 FOR MEDICARE OP): Performed by: STUDENT IN AN ORGANIZED HEALTH CARE EDUCATION/TRAINING PROGRAM

## 2024-04-13 PROCEDURE — 99291 CRITICAL CARE FIRST HOUR: CPT

## 2024-04-13 PROCEDURE — 85025 COMPLETE CBC W/AUTO DIFF WBC: CPT

## 2024-04-13 RX ADMIN — CARBOXYMETHYLCELLULOSE SODIUM 1 DROP: 5 SOLUTION/ DROPS OPHTHALMIC at 21:23

## 2024-04-13 RX ADMIN — HEPARIN SODIUM 5000 UNITS: 5000 INJECTION INTRAVENOUS; SUBCUTANEOUS at 13:43

## 2024-04-13 RX ADMIN — POLYETHYLENE GLYCOL 3350 17 G: 17 POWDER, FOR SOLUTION ORAL at 08:31

## 2024-04-13 RX ADMIN — CEFAZOLIN SODIUM 2 G: 2 INJECTION, SOLUTION INTRAVENOUS at 10:56

## 2024-04-13 RX ADMIN — HEPARIN SODIUM 5000 UNITS: 5000 INJECTION INTRAVENOUS; SUBCUTANEOUS at 07:45

## 2024-04-13 RX ADMIN — HEPARIN SODIUM 5000 UNITS: 5000 INJECTION INTRAVENOUS; SUBCUTANEOUS at 21:23

## 2024-04-13 RX ADMIN — OXYCODONE HYDROCHLORIDE 10 MG: 5 TABLET ORAL at 03:26

## 2024-04-13 RX ADMIN — AMLODIPINE BESYLATE 10 MG: 10 TABLET ORAL at 08:31

## 2024-04-13 RX ADMIN — CARBOXYMETHYLCELLULOSE SODIUM 1 DROP: 5 SOLUTION/ DROPS OPHTHALMIC at 13:43

## 2024-04-13 RX ADMIN — OXYCODONE HYDROCHLORIDE 5 MG: 5 TABLET ORAL at 22:13

## 2024-04-13 RX ADMIN — CARBOXYMETHYLCELLULOSE SODIUM 1 DROP: 5 SOLUTION/ DROPS OPHTHALMIC at 08:31

## 2024-04-13 RX ADMIN — SENNOSIDES AND DOCUSATE SODIUM 2 TABLET: 8.6; 5 TABLET ORAL at 21:23

## 2024-04-13 RX ADMIN — DEXAMETHASONE SODIUM PHOSPHATE 3.2 MG: 4 INJECTION, SOLUTION INTRAMUSCULAR; INTRAVENOUS at 08:31

## 2024-04-13 RX ADMIN — CARBOXYMETHYLCELLULOSE SODIUM 1 DROP: 5 SOLUTION/ DROPS OPHTHALMIC at 18:05

## 2024-04-13 RX ADMIN — SENNOSIDES AND DOCUSATE SODIUM 2 TABLET: 8.6; 5 TABLET ORAL at 08:31

## 2024-04-13 RX ADMIN — CEFAZOLIN SODIUM 2 G: 2 INJECTION, SOLUTION INTRAVENOUS at 18:05

## 2024-04-13 RX ADMIN — ERYTHROMYCIN 1 CM: 5 OINTMENT OPHTHALMIC at 21:24

## 2024-04-13 RX ADMIN — DEXAMETHASONE SODIUM PHOSPHATE 3.2 MG: 4 INJECTION, SOLUTION INTRAMUSCULAR; INTRAVENOUS at 16:17

## 2024-04-13 RX ADMIN — ACETAMINOPHEN 650 MG: 325 TABLET ORAL at 18:04

## 2024-04-13 RX ADMIN — CEFAZOLIN SODIUM 2 G: 2 INJECTION, SOLUTION INTRAVENOUS at 04:21

## 2024-04-13 RX ADMIN — DEXAMETHASONE SODIUM PHOSPHATE 3.2 MG: 4 INJECTION, SOLUTION INTRAMUSCULAR; INTRAVENOUS at 00:46

## 2024-04-13 RX ADMIN — ACETAMINOPHEN 650 MG: 325 TABLET ORAL at 00:45

## 2024-04-13 RX ADMIN — ACETAMINOPHEN 650 MG: 325 TABLET ORAL at 07:44

## 2024-04-13 RX ADMIN — PANTOPRAZOLE SODIUM 40 MG: 40 TABLET, DELAYED RELEASE ORAL at 08:33

## 2024-04-13 RX ADMIN — ACETAMINOPHEN 650 MG: 325 TABLET ORAL at 13:43

## 2024-04-13 RX ADMIN — SCOPOLAMINE 1 PATCH: 1.5 PATCH, EXTENDED RELEASE TRANSDERMAL at 07:44

## 2024-04-13 ASSESSMENT — PAIN - FUNCTIONAL ASSESSMENT
PAIN_FUNCTIONAL_ASSESSMENT: 0-10

## 2024-04-13 ASSESSMENT — PAIN SCALES - GENERAL
PAINLEVEL_OUTOF10: 5 - MODERATE PAIN
PAINLEVEL_OUTOF10: 0 - NO PAIN
PAINLEVEL_OUTOF10: 5 - MODERATE PAIN
PAINLEVEL_OUTOF10: 8
PAINLEVEL_OUTOF10: 5 - MODERATE PAIN
PAINLEVEL_OUTOF10: 0 - NO PAIN
PAINLEVEL_OUTOF10: 6

## 2024-04-13 ASSESSMENT — PAIN DESCRIPTION - LOCATION
LOCATION: HEAD

## 2024-04-13 NOTE — CARE PLAN
Problem: Pain - Adult  Goal: Verbalizes/displays adequate comfort level or baseline comfort level  Outcome: Met     Problem: Safety - Adult  Goal: Free from fall injury  Outcome: Met     Problem: Discharge Planning  Goal: Discharge to home or other facility with appropriate resources  Outcome: Progressing     Problem: Skin  Goal: Promote skin healing  Outcome: Met

## 2024-04-13 NOTE — PROGRESS NOTES
"Radha Holland is a 48 y.o. female on day 8 of admission presenting with Brain mass.    Subjective   No acute events overnight    Objective     Physical Exam  Aox3  Fcx4 5/5  EVD site cdi  Incision c/d/i    Last Recorded Vitals  Blood pressure 114/86, pulse 55, temperature 36.1 °C (97 °F), temperature source Temporal, resp. rate 13, height 1.626 m (5' 4.02\"), weight 104 kg (229 lb 4.5 oz), SpO2 100%.  Intake/Output last 3 Shifts:  I/O last 3 completed shifts:  In: 1530 (14.8 mL/kg) [P.O.:980; IV Piggyback:550]  Out: 1782 (17.2 mL/kg) [Urine:1550 (0.4 mL/kg/hr); Drains:232]  Weight: 103.6 kg     Relevant Results           This patient currently has cardiac telemetry ordered; if you would like to modify or discontinue the telemetry order, click here to go to the orders activity to modify/discontinue the order.                 Assessment/Plan   Principal Problem:    Brain mass  Active Problems:    HTN (hypertension)    VERN (obstructive sleep apnea)    Asthma (HHS-HCC)    48 F h/o arthritis, HTN, VERN (on CPAP), prior ICH (?), p/w 1 year HA, 4d worsening HA, n/v CTH 3cm intraventricular mass originating from septum, MRI Brain cystic lateral vent mass, MRA neg, 4/5 CT CAP neg    4/9 s/p R crani for colloid cyst fenestration, RF EVD placement. CTH POC, cath in posn  4/12 EVD clamped, opened x1 for HA and increased ICPs    Plan:  -NSU  -continue EVD clamp trial  -dex taper to off over 1 week, 3q8 today  -ancef while EVD is in   -PTOT-OK to work with and mobilize with EVD in  -SCDs, SQH      Vanessa Mena MD      "

## 2024-04-13 NOTE — PROGRESS NOTES
[facility]  NEUROSCIENCE INTENSIVE CARE UNIT  DAILY PROGRESS NOTE    Patient Name: Radha Holland     MRN: [id number]     Admit Date: 2024     : 1975 AGE: 48 y.o. GENDER: female      Subjective   Patient had intermittent headaches throughout the night that improved with CSF drainage    Objective   Vitals 24 hour ranges:  Heart Rate:  [54-86]   Temp:  [36.1 °C (97 °F)-36.9 °C (98.4 °F)]   Resp:  [12-24]   BP: (104-155)/()   Weight:  [104 kg (229 lb 4.5 oz)]   SpO2:  [95 %-100 %]    Intake/Output for last 24 hours:    Intake/Output Summary (Last 24 hours) at 2024 0838  Last data filed at 2024 0540  Gross per 24 hour   Intake 840 ml   Output 870 ml   Net -30 ml     Physical Exam:  NEURO:  - Alert, oriented x4, follows commands  - Following commands, 5/5 strength x 4 extremities  CV:  - RRR on telemetry, NSR  RESP:  - Regular, unlabored on room air  :  - external catheter in place  GI:  - Abdomen NT/ND, soft  SKIN:  - cranial incision c/d/i    Medications  Scheduled: PRN: Continuous:   acetaminophen, 650 mg, oral, q6h  amLODIPine, 10 mg, oral, Daily  ceFAZolin, 2 g, intravenous, q8h  dexAMETHasone, 3.2 mg, intravenous, q8h  erythromycin, 1 cm, Both Eyes, Nightly  heparin (porcine), 5,000 Units, subcutaneous, q8h  insulin lispro, 0-5 Units, subcutaneous, TID with meals  [Held by provider] losartan, 25 mg, oral, Daily  artificial tears, 1 drop, Both Eyes, 4x daily  pantoprazole, 40 mg, oral, Daily before breakfast   Or  pantoprazole, 40 mg, intravenous, Daily before breakfast  polyethylene glycol, 17 g, oral, Daily  scopolamine, 1 patch, transdermal, q72h  sennosides-docusate sodium, 2 tablet, oral, BID     PRN medications: albuterol, benzocaine-menthol, calcium gluconate, calcium gluconate, cyclobenzaprine, dextrose, dextrose, diphenhydrAMINE, glucagon, glucagon, hydrALAZINE, HYDROmorphone, labetaloL, magnesium sulfate, magnesium sulfate, melatonin, naloxone,  ondansetron **OR** ondansetron, oxyCODONE, oxyCODONE, oxyCODONE, potassium chloride CR **OR** potassium chloride, potassium chloride CR **OR** potassium chloride, potassium chloride, promethazine, promethazine **OR** promethazine         Lab Results  Lab Results   Component Value Date    WBC 13.5 (H) 04/13/2024    HGB 10.5 (L) 04/13/2024    HCT 30.3 (L) 04/13/2024    MCV 87 04/13/2024     04/13/2024     Lab Results   Component Value Date    GLUCOSE 96 04/13/2024    CALCIUM 8.2 (L) 04/13/2024     04/13/2024    K 4.4 04/13/2024    CO2 26 04/13/2024     04/13/2024    BUN 12 04/13/2024    CREATININE 0.58 04/13/2024       Imaging Results  CT head wo IV contrast   Final Result   1. Postsurgical changes consistent with bifrontal frontal craniotomy   and right frontal approach external ventricular drain placement.   Postsurgical changes as described in detail above.   2. Interval development of mild right-to-left midline shift,   measuring about 0.4 cm of the level of the septum pellucidum.        I personally reviewed the images/study and I agree with the findings   as stated by Jimmy Anguiano MD.        MACRO:   None        Signed by: Cris Linder 4/10/2024 7:04 AM   Dictation workstation:   [id number]      CT head wo IV contrast volumetric surgical planning   Final Result   1. Redemonstration of a well-circumscribed lesion of the superior   aspect of the 3rd ventricle, which can be correlated to the findings   seen on MRI brain 04/05/2024.   2. No evidence of acute cortical infarct or intracranial hemorrhage.        I personally reviewed the images/study and I agree with the findings   as stated by Jimmy Anguiano MD.        MACRO:   None        Signed by: Cris Linder 4/9/2024 7:29 AM   Dictation workstation:   [id number]      CT chest abdomen pelvis w IV contrast   Final Result   CHEST:   1.  No evidence of primary malignancy/metastatic disease in the chest.        ABDOMEN-PELVIS:   1.  No  evidence of primary malignancy/metastatic disease in the   abdomen/pelvis.        I personally reviewed the images/study and I agree with the findings   as stated by Dr. Herminio Love. This study was interpreted at   [place], [place].        MACRO:   None        Signed by: Stevenson Dumont 4/5/2024 5:14 PM   Dictation workstation:   [id number]      XR chest 1 view   Final Result   Bibasilar atelectasis without evidence of acute cardiopulmonary   process.        I personally reviewed the images/study and I agree with Madelyn Paz DO's (radiology resident) findings as stated. This study   was interpreted at [place], [place].        MACRO:   None        Signed by: David Torres 4/5/2024 8:15 AM   Dictation workstation:   [id number]      MR brain w and wo IV contrast   Final Result   MRI Brain:        Isolated, circumscribed T1/T2 hyperintense lesion in the superior   aspect of the 3rd ventricle measuring up to 2.9 x 2.8 x 2.9 cm.   Leading differential consideration is a colloid cyst. A focus of   blooming artifact along the superior margin of the cyst is favored to   reflect dystrophic calcification. Neurocysticercosis could   potentially have a similar imaging appearance. Lack of fat   suppression makes dermoid less likely. No evidence of obstructive   hydrocephalus at this time. No evidence of acute intracranial   hemorrhage.        MRA:        No evidence of source vessel arterial occlusion, aneurysm, or   vascular malformation.        I personally reviewed the images/study and I agree with the findings   as stated by resident physician Dr. Sebastián Redmond.        MACRO:   None        Signed by: Yuriy Manning 4/5/2024 8:31 AM   Dictation workstation:   [id number]      MR angio head wo IV contrast   Final Result   MRI Brain:        Isolated, circumscribed T1/T2 hyperintense lesion in the superior   aspect of the  3rd ventricle measuring up to 2.9 x 2.8 x 2.9 cm.   Leading differential consideration is a colloid cyst. A focus of   blooming artifact along the superior margin of the cyst is favored to   reflect dystrophic calcification. Neurocysticercosis could   potentially have a similar imaging appearance. Lack of fat   suppression makes dermoid less likely. No evidence of obstructive   hydrocephalus at this time. No evidence of acute intracranial   hemorrhage.        MRA:        No evidence of source vessel arterial occlusion, aneurysm, or   vascular malformation.        I personally reviewed the images/study and I agree with the findings   as stated by resident physician Dr. Sebastián Redmond.        MACRO:   None        Signed by: Yuriy Manning 4/5/2024 8:31 AM   Dictation workstation:   [id number]      CT head wo IV contrast    (Results Pending)         Assessment/Plan   NEURO:  #Ventricular mass s/p R crani for colloid cyst fenestration, EVD placement  Assessment:  - Neurologically stable POD 4  - EVD re-opened to 10 after multiple episodes symptomatic elevated ICPs; 24h output 20, shift 26  Plan:  - NSU  - Q1H neuro checks  - EVD re-opened at 10  - Con't ancef while EVD in place  - Con't dex 3mg q8 with plan to taper off over 1 week (down to 2q8 tomorrow)  - Pain: acetaminophen, oxycodone, hydromorphone PRN  - Nausea: ondansetron, phenergan, scopolamine patch  - PT/OT - low intensity, con't to mobilize with EVD in place    CARDIOVASCULAR:  #HTN  Assessment:  - Stable, at goal   Plan:  - Continue to monitor on telemetry  - On home amlodipine 10mg daily  - Goal SBP < 160 - Nicardipine, Hydralazine and Labetalol PRN    RESPIRATORY:  #VERN on CPAP  Assessment:  - Stable on room air   Plan:  - Continuous pulse oximetry   - O2 PRN to maintain SpO2 > 94%, wean as tolerated  - Incentive spirometry while awake     RENAL/:  #No active issues  Assessment:  Results from last 72 hours   Lab Units 04/13/24  0326 04/12/24  0203   BUN  mg/dL 12 11   CREATININE mg/dL 0.58 0.59       Plan:  - Monitor with daily RFP    FEN/GI:  #Hyponatremia (resolved)  Assessment:  - Had BM 4/10  - Stable Na: 136 <- 136  Plan:  - Monitor and replace electrolytes per protocol  - Daily RFP  - Diet: regular  - Bowel Regimen: Docusate-Senna, Miralax    ENDOCRINE:  #No active issues  Assessment:  Results from last 7 days   Lab Units 24  0723 24  0326 24  1548 24  1126 24  0755 24  0203 24  1551   POCT GLUCOSE mg/dL 109*  --  137* 106* 130* 115*  --  138*   GLUCOSE mg/dL  --  96  --   --   --   --  142*  --       Plan:  - On dex, PPI, SSI     HEMATOLOGY:  #anemia  Assessment:  - Baseline Hgb: 13.1  - Baseline Plts: 276  - Stable H/H  Results from last 7 days   Lab Units 24  0326 24  0203   HEMOGLOBIN g/dL 10.5* 10.5*   HEMATOCRIT % 30.3* 31.0*   PLATELETS AUTO x10*3/uL 272 245     Plan:  - Continue to monitor with daily CBC    INFECTIOUS DISEASE:  #Leukocytosis  Assessment:  Improving, afebrile, no active s/sx of infection  Results from last 7 days   Lab Units 24  0326 24  0203   WBC AUTO x10*3/uL 13.5* 19.0*     - Temp (24hrs), Av.4 °C (97.6 °F), Min:36.1 °C (97 °F), Max:36.9 °C (98.4 °F)  Plan:  - Continue to monitor for s/sx of infection and daily CBC  - Pan culture for temperature > 38.4 C    MUSCULOSKELETAL:  - No acute issues    SKIN:  - EVD and cranial incision c/d/I   - Turns and skin care per NSU protocol    ACCESS:  - PIV x 3    PROPHYLAXIS:  - DVT/VTE: SCDs, SQ heparin   - GI: Protonix    RESTRAINTS:  Not indicated/Patient does not meet criteria for restraints    Chino Dueñas MD  Neuroscience Intensive Care    Plan of care to be discussed with neurocritical care attending

## 2024-04-14 ENCOUNTER — APPOINTMENT (OUTPATIENT)
Dept: RADIOLOGY | Facility: HOSPITAL | Age: 49
End: 2024-04-14
Payer: COMMERCIAL

## 2024-04-14 PROBLEM — G91.1 OBSTRUCTIVE HYDROCEPHALUS (MULTI): Status: ACTIVE | Noted: 2024-04-04

## 2024-04-14 LAB
ABO GROUP (TYPE) IN BLOOD: NORMAL
ALBUMIN SERPL BCP-MCNC: 3 G/DL (ref 3.4–5)
ANION GAP SERPL CALC-SCNC: 12 MMOL/L (ref 10–20)
ANTIBODY SCREEN: NORMAL
APTT PPP: 30 SECONDS (ref 27–38)
BASOPHILS # BLD AUTO: 0.03 X10*3/UL (ref 0–0.1)
BASOPHILS NFR BLD AUTO: 0.2 %
BUN SERPL-MCNC: 14 MG/DL (ref 6–23)
CA-I BLD-SCNC: 1.12 MMOL/L (ref 1.1–1.33)
CALCIUM SERPL-MCNC: 8.3 MG/DL (ref 8.6–10.6)
CHLORIDE SERPL-SCNC: 102 MMOL/L (ref 98–107)
CO2 SERPL-SCNC: 25 MMOL/L (ref 21–32)
CREAT SERPL-MCNC: 0.66 MG/DL (ref 0.5–1.05)
EGFRCR SERPLBLD CKD-EPI 2021: >90 ML/MIN/1.73M*2
EOSINOPHIL # BLD AUTO: 0.01 X10*3/UL (ref 0–0.7)
EOSINOPHIL NFR BLD AUTO: 0.1 %
ERYTHROCYTE [DISTWIDTH] IN BLOOD BY AUTOMATED COUNT: 13 % (ref 11.5–14.5)
GLUCOSE BLD MANUAL STRIP-MCNC: 109 MG/DL (ref 74–99)
GLUCOSE BLD MANUAL STRIP-MCNC: 116 MG/DL (ref 74–99)
GLUCOSE BLD MANUAL STRIP-MCNC: 148 MG/DL (ref 74–99)
GLUCOSE SERPL-MCNC: 116 MG/DL (ref 74–99)
HCT VFR BLD AUTO: 33.6 % (ref 36–46)
HGB BLD-MCNC: 11.6 G/DL (ref 12–16)
IMM GRANULOCYTES # BLD AUTO: 0.2 X10*3/UL (ref 0–0.7)
IMM GRANULOCYTES NFR BLD AUTO: 1.3 % (ref 0–0.9)
INR PPP: 0.9 (ref 0.9–1.1)
LYMPHOCYTES # BLD AUTO: 2.28 X10*3/UL (ref 1.2–4.8)
LYMPHOCYTES NFR BLD AUTO: 14.5 %
MAGNESIUM SERPL-MCNC: 2 MG/DL (ref 1.6–2.4)
MCH RBC QN AUTO: 28.9 PG (ref 26–34)
MCHC RBC AUTO-ENTMCNC: 34.5 G/DL (ref 32–36)
MCV RBC AUTO: 84 FL (ref 80–100)
MONOCYTES # BLD AUTO: 1.41 X10*3/UL (ref 0.1–1)
MONOCYTES NFR BLD AUTO: 9 %
NEUTROPHILS # BLD AUTO: 11.79 X10*3/UL (ref 1.2–7.7)
NEUTROPHILS NFR BLD AUTO: 74.9 %
NRBC BLD-RTO: 0 /100 WBCS (ref 0–0)
PHOSPHATE SERPL-MCNC: 2.7 MG/DL (ref 2.5–4.9)
PLATELET # BLD AUTO: 319 X10*3/UL (ref 150–450)
POTASSIUM SERPL-SCNC: 4.7 MMOL/L (ref 3.5–5.3)
PROTHROMBIN TIME: 10.5 SECONDS (ref 9.8–12.8)
RBC # BLD AUTO: 4.01 X10*6/UL (ref 4–5.2)
RH FACTOR (ANTIGEN D): NORMAL
SODIUM SERPL-SCNC: 134 MMOL/L (ref 136–145)
WBC # BLD AUTO: 15.7 X10*3/UL (ref 4.4–11.3)

## 2024-04-14 PROCEDURE — 80069 RENAL FUNCTION PANEL: CPT

## 2024-04-14 PROCEDURE — 2500000001 HC RX 250 WO HCPCS SELF ADMINISTERED DRUGS (ALT 637 FOR MEDICARE OP)

## 2024-04-14 PROCEDURE — 2020000001 HC ICU ROOM DAILY

## 2024-04-14 PROCEDURE — 70553 MRI BRAIN STEM W/O & W/DYE: CPT | Performed by: RADIOLOGY

## 2024-04-14 PROCEDURE — 76937 US GUIDE VASCULAR ACCESS: CPT

## 2024-04-14 PROCEDURE — 2550000001 HC RX 255 CONTRASTS: Performed by: NEUROLOGICAL SURGERY

## 2024-04-14 PROCEDURE — A9575 INJ GADOTERATE MEGLUMI 0.1ML: HCPCS | Performed by: NEUROLOGICAL SURGERY

## 2024-04-14 PROCEDURE — 36415 COLL VENOUS BLD VENIPUNCTURE: CPT

## 2024-04-14 PROCEDURE — 99291 CRITICAL CARE FIRST HOUR: CPT

## 2024-04-14 PROCEDURE — 85025 COMPLETE CBC W/AUTO DIFF WBC: CPT

## 2024-04-14 PROCEDURE — 2500000004 HC RX 250 GENERAL PHARMACY W/ HCPCS (ALT 636 FOR OP/ED)

## 2024-04-14 PROCEDURE — 82947 ASSAY GLUCOSE BLOOD QUANT: CPT

## 2024-04-14 PROCEDURE — 82330 ASSAY OF CALCIUM: CPT

## 2024-04-14 PROCEDURE — 70553 MRI BRAIN STEM W/O & W/DYE: CPT

## 2024-04-14 PROCEDURE — 83735 ASSAY OF MAGNESIUM: CPT

## 2024-04-14 PROCEDURE — 85610 PROTHROMBIN TIME: CPT | Performed by: STUDENT IN AN ORGANIZED HEALTH CARE EDUCATION/TRAINING PROGRAM

## 2024-04-14 PROCEDURE — 36415 COLL VENOUS BLD VENIPUNCTURE: CPT | Performed by: STUDENT IN AN ORGANIZED HEALTH CARE EDUCATION/TRAINING PROGRAM

## 2024-04-14 PROCEDURE — 2500000005 HC RX 250 GENERAL PHARMACY W/O HCPCS: Performed by: STUDENT IN AN ORGANIZED HEALTH CARE EDUCATION/TRAINING PROGRAM

## 2024-04-14 PROCEDURE — 2500000004 HC RX 250 GENERAL PHARMACY W/ HCPCS (ALT 636 FOR OP/ED): Performed by: STUDENT IN AN ORGANIZED HEALTH CARE EDUCATION/TRAINING PROGRAM

## 2024-04-14 PROCEDURE — 2500000004 HC RX 250 GENERAL PHARMACY W/ HCPCS (ALT 636 FOR OP/ED): Mod: JZ | Performed by: STUDENT IN AN ORGANIZED HEALTH CARE EDUCATION/TRAINING PROGRAM

## 2024-04-14 PROCEDURE — 86901 BLOOD TYPING SEROLOGIC RH(D): CPT | Performed by: STUDENT IN AN ORGANIZED HEALTH CARE EDUCATION/TRAINING PROGRAM

## 2024-04-14 PROCEDURE — 2500000001 HC RX 250 WO HCPCS SELF ADMINISTERED DRUGS (ALT 637 FOR MEDICARE OP): Performed by: STUDENT IN AN ORGANIZED HEALTH CARE EDUCATION/TRAINING PROGRAM

## 2024-04-14 RX ORDER — GADOTERATE MEGLUMINE 376.9 MG/ML
20 INJECTION INTRAVENOUS
Status: COMPLETED | OUTPATIENT
Start: 2024-04-14 | End: 2024-04-14

## 2024-04-14 RX ORDER — SODIUM CHLORIDE 9 MG/ML
100 INJECTION, SOLUTION INTRAVENOUS CONTINUOUS
Status: DISCONTINUED | OUTPATIENT
Start: 2024-04-15 | End: 2024-04-15

## 2024-04-14 RX ADMIN — PANTOPRAZOLE SODIUM 40 MG: 40 TABLET, DELAYED RELEASE ORAL at 08:06

## 2024-04-14 RX ADMIN — HEPARIN SODIUM 5000 UNITS: 5000 INJECTION INTRAVENOUS; SUBCUTANEOUS at 08:05

## 2024-04-14 RX ADMIN — DEXAMETHASONE SODIUM PHOSPHATE 2 MG: 4 INJECTION INTRA-ARTICULAR; INTRALESIONAL; INTRAMUSCULAR; INTRAVENOUS; SOFT TISSUE at 15:18

## 2024-04-14 RX ADMIN — ACETAMINOPHEN 650 MG: 325 TABLET ORAL at 08:05

## 2024-04-14 RX ADMIN — CARBOXYMETHYLCELLULOSE SODIUM 1 DROP: 5 SOLUTION/ DROPS OPHTHALMIC at 08:06

## 2024-04-14 RX ADMIN — DEXAMETHASONE SODIUM PHOSPHATE 3.2 MG: 4 INJECTION, SOLUTION INTRAMUSCULAR; INTRAVENOUS at 08:06

## 2024-04-14 RX ADMIN — CEFAZOLIN SODIUM 2 G: 2 INJECTION, SOLUTION INTRAVENOUS at 11:06

## 2024-04-14 RX ADMIN — LOSARTAN POTASSIUM 25 MG: 25 TABLET, FILM COATED ORAL at 08:05

## 2024-04-14 RX ADMIN — CARBOXYMETHYLCELLULOSE SODIUM 1 DROP: 5 SOLUTION/ DROPS OPHTHALMIC at 20:33

## 2024-04-14 RX ADMIN — CARBOXYMETHYLCELLULOSE SODIUM 1 DROP: 5 SOLUTION/ DROPS OPHTHALMIC at 15:19

## 2024-04-14 RX ADMIN — HEPARIN SODIUM 5000 UNITS: 5000 INJECTION INTRAVENOUS; SUBCUTANEOUS at 15:18

## 2024-04-14 RX ADMIN — ERYTHROMYCIN 1 CM: 5 OINTMENT OPHTHALMIC at 20:34

## 2024-04-14 RX ADMIN — CEFAZOLIN SODIUM 2 G: 2 INJECTION, SOLUTION INTRAVENOUS at 03:22

## 2024-04-14 RX ADMIN — CEFAZOLIN SODIUM 2 G: 2 INJECTION, SOLUTION INTRAVENOUS at 18:03

## 2024-04-14 RX ADMIN — DEXAMETHASONE SODIUM PHOSPHATE 3.2 MG: 4 INJECTION, SOLUTION INTRAMUSCULAR; INTRAVENOUS at 00:24

## 2024-04-14 RX ADMIN — ONDANSETRON 4 MG: 2 INJECTION INTRAMUSCULAR; INTRAVENOUS at 13:12

## 2024-04-14 RX ADMIN — ACETAMINOPHEN 650 MG: 325 TABLET ORAL at 17:21

## 2024-04-14 RX ADMIN — AMLODIPINE BESYLATE 10 MG: 10 TABLET ORAL at 08:05

## 2024-04-14 RX ADMIN — GADOTERATE MEGLUMINE 20 ML: 376.9 INJECTION INTRAVENOUS at 13:28

## 2024-04-14 RX ADMIN — ACETAMINOPHEN 650 MG: 325 TABLET ORAL at 00:23

## 2024-04-14 ASSESSMENT — PAIN DESCRIPTION - LOCATION
LOCATION: HEAD

## 2024-04-14 ASSESSMENT — PAIN SCALES - GENERAL
PAINLEVEL_OUTOF10: 4
PAINLEVEL_OUTOF10: 0 - NO PAIN
PAINLEVEL_OUTOF10: 4
PAINLEVEL_OUTOF10: 5 - MODERATE PAIN
PAINLEVEL_OUTOF10: 4

## 2024-04-14 ASSESSMENT — PAIN - FUNCTIONAL ASSESSMENT
PAIN_FUNCTIONAL_ASSESSMENT: 0-10

## 2024-04-14 NOTE — PROGRESS NOTES
[facility]  NEUROSCIENCE INTENSIVE CARE UNIT  DAILY PROGRESS NOTE    Patient Name: Radha Holland     MRN: [id number]     Admit Date: 2024     : 1975 AGE: 48 y.o. 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, MRI Brain cystic lateral vent mass, MRA neg,  CT CAP neg presenting with mass resection post crani and EVD  Interval Events:  Patient had intermittent headaches throughout the night that improved with CSF drainage, so EVD unclamped    Objective   Vitals 24 hour ranges:  Heart Rate:  [58-96]   Temp:  [36.1 °C (97 °F)-36.7 °C (98.1 °F)]   Resp:  [13-26]   BP: ()/()   SpO2:  [96 %-100 %]    Intake/Output for last 24 hours:    Intake/Output Summary (Last 24 hours) at 2024 0852  Last data filed at 2024 0600  Gross per 24 hour   Intake 2280 ml   Output 1111 ml   Net 1169 ml     Physical Exam:  NEURO:  - Alert, oriented x4, follows commands  - Following commands, 5/5 strength x 4 extremities  CV:  - RRR on telemetry, NSR  RESP:  - Regular, unlabored on room air  :  - external catheter in place  GI:  - Abdomen NT/ND, soft  SKIN:  - cranial incision c/d/i    Medications  Scheduled: PRN: Continuous:   acetaminophen, 650 mg, oral, q6h  amLODIPine, 10 mg, oral, Daily  ceFAZolin, 2 g, intravenous, q8h  dexAMETHasone, 3.2 mg, intravenous, q8h  erythromycin, 1 cm, Both Eyes, Nightly  heparin (porcine), 5,000 Units, subcutaneous, q8h  insulin lispro, 0-5 Units, subcutaneous, TID with meals  losartan, 25 mg, oral, Daily  artificial tears, 1 drop, Both Eyes, 4x daily  pantoprazole, 40 mg, oral, Daily before breakfast   Or  pantoprazole, 40 mg, intravenous, Daily before breakfast  polyethylene glycol, 17 g, oral, Daily  scopolamine, 1 patch, transdermal, q72h  sennosides-docusate sodium, 2 tablet, oral, BID     PRN medications:  albuterol, benzocaine-menthol, calcium gluconate, calcium gluconate, cyclobenzaprine, dextrose, dextrose, diphenhydrAMINE, glucagon, glucagon, hydrALAZINE, HYDROmorphone, labetaloL, magnesium sulfate, magnesium sulfate, melatonin, naloxone, ondansetron **OR** ondansetron, oxyCODONE, oxyCODONE, oxyCODONE, potassium chloride CR **OR** potassium chloride, potassium chloride CR **OR** potassium chloride, potassium chloride, promethazine, promethazine **OR** promethazine         Lab Results  Lab Results   Component Value Date    WBC 15.7 (H) 04/14/2024    HGB 11.6 (L) 04/14/2024    HCT 33.6 (L) 04/14/2024    MCV 84 04/14/2024     04/14/2024     Lab Results   Component Value Date    GLUCOSE 116 (H) 04/14/2024    CALCIUM 8.3 (L) 04/14/2024     (L) 04/14/2024    K 4.7 04/14/2024    CO2 25 04/14/2024     04/14/2024    BUN 14 04/14/2024    CREATININE 0.66 04/14/2024       Imaging Results  CT head wo IV contrast   Final Result   Redemonstration of postoperative changes from bifrontal craniotomy   with persistent but mildly improved pneumocephalus.        Redemonstration of right frontal approach ventriculostomy shunt   catheter, unchanged in position. There has been interval resolution   of intraventricular air and as a result decreased caliber of the   frontal horns of the lateral ventricles.        MACRO:   None        Signed by: Cris Linder 4/13/2024 12:57 PM   Dictation workstation:   [id number]      CT head wo IV contrast   Final Result   1. Postsurgical changes consistent with bifrontal frontal craniotomy   and right frontal approach external ventricular drain placement.   Postsurgical changes as described in detail above.   2. Interval development of mild right-to-left midline shift,   measuring about 0.4 cm of the level of the septum pellucidum.        I personally reviewed the images/study and I agree with the findings   as stated by Jimmy Anguiano MD.        MACRO:   None        Signed by:  Cris Linder 4/10/2024 7:04 AM   Dictation workstation:   [id number]      CT head wo IV contrast volumetric surgical planning   Final Result   1. Redemonstration of a well-circumscribed lesion of the superior   aspect of the 3rd ventricle, which can be correlated to the findings   seen on MRI brain 04/05/2024.   2. No evidence of acute cortical infarct or intracranial hemorrhage.        I personally reviewed the images/study and I agree with the findings   as stated by Jimmy Anguiano MD.        MACRO:   None        Signed by: Cris Linder 4/9/2024 7:29 AM   Dictation workstation:   [id number]      CT chest abdomen pelvis w IV contrast   Final Result   CHEST:   1.  No evidence of primary malignancy/metastatic disease in the chest.        ABDOMEN-PELVIS:   1.  No evidence of primary malignancy/metastatic disease in the   abdomen/pelvis.        I personally reviewed the images/study and I agree with the findings   as stated by Dr. Herminio Love. This study was interpreted at   [place], [place].        MACRO:   None        Signed by: Stevenson Dumont 4/5/2024 5:14 PM   Dictation workstation:   [id number]      XR chest 1 view   Final Result   Bibasilar atelectasis without evidence of acute cardiopulmonary   process.        I personally reviewed the images/study and I agree with Madelyn Paz DO's (radiology resident) findings as stated. This study   was interpreted at [place], [place].        MACRO:   None        Signed by: David Torres 4/5/2024 8:15 AM   Dictation workstation:   [id number]      MR brain w and wo IV contrast   Final Result   MRI Brain:        Isolated, circumscribed T1/T2 hyperintense lesion in the superior   aspect of the 3rd ventricle measuring up to 2.9 x 2.8 x 2.9 cm.   Leading differential consideration is a colloid cyst. A focus of   blooming artifact along the superior margin of the  cyst is favored to   reflect dystrophic calcification. Neurocysticercosis could   potentially have a similar imaging appearance. Lack of fat   suppression makes dermoid less likely. No evidence of obstructive   hydrocephalus at this time. No evidence of acute intracranial   hemorrhage.        MRA:        No evidence of source vessel arterial occlusion, aneurysm, or   vascular malformation.        I personally reviewed the images/study and I agree with the findings   as stated by resident physician Dr. Sebastián Redmond.        MACRO:   None        Signed by: Yuriy Manning 4/5/2024 8:31 AM   Dictation workstation:   [id number]      MR angio head wo IV contrast   Final Result   MRI Brain:        Isolated, circumscribed T1/T2 hyperintense lesion in the superior   aspect of the 3rd ventricle measuring up to 2.9 x 2.8 x 2.9 cm.   Leading differential consideration is a colloid cyst. A focus of   blooming artifact along the superior margin of the cyst is favored to   reflect dystrophic calcification. Neurocysticercosis could   potentially have a similar imaging appearance. Lack of fat   suppression makes dermoid less likely. No evidence of obstructive   hydrocephalus at this time. No evidence of acute intracranial   hemorrhage.        MRA:        No evidence of source vessel arterial occlusion, aneurysm, or   vascular malformation.        I personally reviewed the images/study and I agree with the findings   as stated by resident physician Dr. Sebastián Redmond.        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, EVD placement  Assessment:  - Neurologically stable POD 4  - EVD re-opened to 10 after multiple episodes symptomatic elevated ICPs; 24h output 161, 82 shift  Plan:  - NSU  - Q1H neuro checks  - EVD re-opened at  10, VPS 4/15 given symptomatic hydrocephalus  - Con't ancef while EVD in place  - Dex 2q8 today  - Pain: acetaminophen, oxycodone, hydromorphone PRN  - Nausea: ondansetron, phenergan, scopolamine patch  - PT/OT - low intensity, con't to mobilize with EVD in place    CARDIOVASCULAR:  #HTN  Assessment:  - Stable, at goal   Plan:  - Continue to monitor on telemetry  - On home amlodipine 10mg daily, losartan 25qd  - Goal SBP < 160 - Nicardipine, Hydralazine and Labetalol PRN    RESPIRATORY:  #VERN on CPAP  Assessment:  - Stable on room air   Plan:  - Continuous pulse oximetry   - O2 PRN to maintain SpO2 > 94%, wean as tolerated  - Incentive spirometry while awake     RENAL/:  #No active issues  Assessment:  Results from last 72 hours   Lab Units 04/14/24  0144 04/13/24  0326   BUN mg/dL 14 12   CREATININE mg/dL 0.66 0.58       Plan:  - Monitor with daily RFP    FEN/GI:  #Hyponatremia (resolved)  Assessment:  - Had BM 4/10  - Stable Na: 134  Plan:  - Monitor and replace electrolytes per protocol  - Daily RFP  - Diet: regular  - Bowel Regimen: Docusate-Senna, Miralax    ENDOCRINE:  #No active issues  Assessment:  Results from last 7 days   Lab Units 04/14/24  0804 04/14/24  0144 04/13/24  1515 04/13/24  1128 04/13/24  0723 04/13/24  0326 04/12/24  2003 04/12/24  1548   POCT GLUCOSE mg/dL 116*  --  127* 126* 109*  --  137* 106*   GLUCOSE mg/dL  --  116*  --   --   --  96  --   --       Plan:  - On dex, PPI, SSI     HEMATOLOGY:  #anemia  Assessment:  - Baseline Hgb: 13.1  - Baseline Plts: 276  - Stable H/H  Results from last 7 days   Lab Units 04/14/24  0144 04/13/24  0326   HEMOGLOBIN g/dL 11.6* 10.5*   HEMATOCRIT % 33.6* 30.3*   PLATELETS AUTO x10*3/uL 319 272     Plan:  - Continue to monitor with daily CBC    INFECTIOUS DISEASE:  #Leukocytosis  Assessment:  Improving, afebrile, no active s/sx of infection  Results from last 7 days   Lab Units 04/14/24  0144 04/13/24  0326   WBC AUTO x10*3/uL 15.7* 13.5*     - Temp  (24hrs), Av.4 °C (97.5 °F), Min:36.1 °C (97 °F), Max:36.7 °C (98.1 °F)  Plan:  - Continue to monitor for s/sx of infection and daily CBC  - Pan culture for temperature > 38.4 C    MUSCULOSKELETAL:  - No acute issues    SKIN:  - EVD and cranial incision c/d/I   - Turns and skin care per NSU protocol    ACCESS:  - PIV x 3    PROPHYLAXIS:  - DVT/VTE: SCDs, SQ heparin   - GI: Protonix    RESTRAINTS:  Not indicated/Patient does not meet criteria for restraints    Akshat March MD  Neuroscience Intensive Care    Attending Attestation:   I saw and evaluated the patient. I personally obtained the key and critical portions of the history and physical exam or was physically present for key and critical portions performed by the resident/fellow. I reviewed the resident/fellow's documentation and discussed the patient with the resident/fellow. I agree with the resident/fellow's medical decision making as documented in the note with the exception/addition of the following:     Colloid cyst s/p endoscopic fenestration .  Has EVD in place, failed clamping trial with elevated ICPs and headache. Now open at 10. VPS planning per NSGY.  On Ancef for prophy.  On dex for cerebral edema, weaning.     Nausea, improved, on zofran, scopolamine.     HTN, on home losartan.     Afebrile, has leukocytosis, improving. On steroids. Will monitor for signs of clinical infection.      Statement of Acuity: I have reviewed and evaluated all relevant data of the last 24 hours, personally examined the patient and formulated the plan of care.  This patient was critically ill and required continued critical care treatment.  Total critical care time: 35min.     Houston Quinonez M.D.

## 2024-04-14 NOTE — CARE PLAN
Problem: Discharge Planning  Goal: Discharge to home or other facility with appropriate resources  Outcome: Progressing     Problem: Chronic Conditions and Co-morbidities  Goal: Patient's chronic conditions and co-morbidity symptoms are monitored and maintained or improved  Outcome: Progressing     Problem: Skin  Goal: Prevent/minimize sheer/friction injuries  Outcome: Met

## 2024-04-14 NOTE — HOSPITAL COURSE
Radha Holland is a 48 y.o. 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 MRI brain confirmed cystic lateral vent mass, MRA neg, CT CAP negative. Patient taken to the OR on 4/9 for right craniotomy for colloid cyst fenestration and EVD placement.  Admitted to NSU post op with ongoing headaches that initially improved with medication. Post op CT with postoperative changes and catheter in appropriate position. EVD open to 10. Clamp trial attempted 4/12 with symptomatic elevated ICPs. EVD opened again to 10 with possible plans for shunt placement. Attempted clamp trial again 4/15 with improvement in symptoms after initiation of diamox. EVD removed 4/15. Decision made not to move forward with shunt placement. Ophthalmology consulted 4/15 and did not appreciate papilledema on exam. Repeat head CT completed 4/16 stable. Patient completed 1 week dex taper and was placed on IV ancef while EVD was in place. PT/OT evaluated patient and recommended low intensity level of continued care for which outpatient PT/OT referrals were placed. On the day of discharge, the patient was seen and evaluated by the neurosurgery team and deemed suitable for discharge home.  There were no significant events overnight. Vitals were reviewed and within normal limits. Labs were stable at discharge. On day of discharge the patient was tolerating a diet, pain was controlled on PO pain medication, was ambulating well and voiding spontaneously. The patient was given detailed discharge instructions and were scheduled to follow up as an outpatient.

## 2024-04-14 NOTE — PROGRESS NOTES
"Radha Holland is a 48 y.o. female on day 9 of admission presenting with Brain mass.    Subjective   No acute events overnight    Objective     Physical Exam  Aox3  Fcx4 5/5  EVD site cdi  Incision c/d/i    Last Recorded Vitals  Blood pressure (!) 137/106, pulse 77, temperature 36.7 °C (98.1 °F), temperature source Temporal, resp. rate 20, height 1.626 m (5' 4.02\"), weight 104 kg (229 lb 4.5 oz), SpO2 100%.  Intake/Output last 3 Shifts:  I/O last 3 completed shifts:  In: 2050 (19.7 mL/kg) [P.O.:1800; IV Piggyback:250]  Out: 1505 (14.5 mL/kg) [Urine:1400 (0.4 mL/kg/hr); Drains:105]  Weight: 104 kg     Relevant Results           This patient currently has cardiac telemetry ordered; if you would like to modify or discontinue the telemetry order, click here to go to the orders activity to modify/discontinue the order.                 Assessment/Plan   Principal Problem:    Brain mass  Active Problems:    HTN (hypertension)    VERN (obstructive sleep apnea)    Asthma (HHS-HCC)    48 F h/o arthritis, HTN, VERN (on CPAP), prior ICH (?), p/w 1 year HA, 4d worsening HA, n/v CTH 3cm intraventricular mass originating from septum, MRI Brain cystic lateral vent mass, MRA neg, 4/5 CT CAP neg    4/9 s/p R crani for colloid cyst fenestration, RF EVD placement. CTH POC, cath in posn  4/12 EVD clamped, opened x1 for HA and increased ICPs    Plan:  -NSU  -EVD at 10  -MRI today  -dex taper to off over 1 week  -ancef while EVD is in   -PTOT-OK to work with and mobilize with EVD in  -SCDs, SQH      Hieu Navas MD      "

## 2024-04-15 ENCOUNTER — ANESTHESIA EVENT (OUTPATIENT)
Dept: OPERATING ROOM | Facility: HOSPITAL | Age: 49
End: 2024-04-15
Payer: COMMERCIAL

## 2024-04-15 LAB
ALBUMIN SERPL BCP-MCNC: 2.6 G/DL (ref 3.4–5)
ANION GAP SERPL CALC-SCNC: 11 MMOL/L (ref 10–20)
BASOPHILS # BLD AUTO: 0.02 X10*3/UL (ref 0–0.1)
BASOPHILS NFR BLD AUTO: 0.1 %
BUN SERPL-MCNC: 15 MG/DL (ref 6–23)
CA-I BLD-SCNC: 1.16 MMOL/L (ref 1.1–1.33)
CALCIUM SERPL-MCNC: 7.9 MG/DL (ref 8.6–10.6)
CHLORIDE SERPL-SCNC: 104 MMOL/L (ref 98–107)
CO2 SERPL-SCNC: 25 MMOL/L (ref 21–32)
CREAT SERPL-MCNC: 0.53 MG/DL (ref 0.5–1.05)
EGFRCR SERPLBLD CKD-EPI 2021: >90 ML/MIN/1.73M*2
EOSINOPHIL # BLD AUTO: 0.02 X10*3/UL (ref 0–0.7)
EOSINOPHIL NFR BLD AUTO: 0.1 %
ERYTHROCYTE [DISTWIDTH] IN BLOOD BY AUTOMATED COUNT: 13 % (ref 11.5–14.5)
GLUCOSE BLD MANUAL STRIP-MCNC: 98 MG/DL (ref 74–99)
GLUCOSE SERPL-MCNC: 121 MG/DL (ref 74–99)
HCT VFR BLD AUTO: 31.5 % (ref 36–46)
HGB BLD-MCNC: 11.1 G/DL (ref 12–16)
IMM GRANULOCYTES # BLD AUTO: 0.22 X10*3/UL (ref 0–0.7)
IMM GRANULOCYTES NFR BLD AUTO: 1.4 % (ref 0–0.9)
LYMPHOCYTES # BLD AUTO: 2.04 X10*3/UL (ref 1.2–4.8)
LYMPHOCYTES NFR BLD AUTO: 13.3 %
MAGNESIUM SERPL-MCNC: 1.79 MG/DL (ref 1.6–2.4)
MCH RBC QN AUTO: 30 PG (ref 26–34)
MCHC RBC AUTO-ENTMCNC: 35.2 G/DL (ref 32–36)
MCV RBC AUTO: 85 FL (ref 80–100)
MONOCYTES # BLD AUTO: 1.31 X10*3/UL (ref 0.1–1)
MONOCYTES NFR BLD AUTO: 8.5 %
NEUTROPHILS # BLD AUTO: 11.77 X10*3/UL (ref 1.2–7.7)
NEUTROPHILS NFR BLD AUTO: 76.6 %
NRBC BLD-RTO: 0 /100 WBCS (ref 0–0)
PHOSPHATE SERPL-MCNC: 2.8 MG/DL (ref 2.5–4.9)
PLATELET # BLD AUTO: 324 X10*3/UL (ref 150–450)
POTASSIUM SERPL-SCNC: 4.1 MMOL/L (ref 3.5–5.3)
RBC # BLD AUTO: 3.7 X10*6/UL (ref 4–5.2)
SODIUM SERPL-SCNC: 136 MMOL/L (ref 136–145)
WBC # BLD AUTO: 15.4 X10*3/UL (ref 4.4–11.3)

## 2024-04-15 PROCEDURE — 2500000001 HC RX 250 WO HCPCS SELF ADMINISTERED DRUGS (ALT 637 FOR MEDICARE OP)

## 2024-04-15 PROCEDURE — 82330 ASSAY OF CALCIUM: CPT

## 2024-04-15 PROCEDURE — 2500000001 HC RX 250 WO HCPCS SELF ADMINISTERED DRUGS (ALT 637 FOR MEDICARE OP): Performed by: STUDENT IN AN ORGANIZED HEALTH CARE EDUCATION/TRAINING PROGRAM

## 2024-04-15 PROCEDURE — 36415 COLL VENOUS BLD VENIPUNCTURE: CPT

## 2024-04-15 PROCEDURE — 2500000004 HC RX 250 GENERAL PHARMACY W/ HCPCS (ALT 636 FOR OP/ED): Performed by: REGISTERED NURSE

## 2024-04-15 PROCEDURE — 2500000004 HC RX 250 GENERAL PHARMACY W/ HCPCS (ALT 636 FOR OP/ED): Performed by: STUDENT IN AN ORGANIZED HEALTH CARE EDUCATION/TRAINING PROGRAM

## 2024-04-15 PROCEDURE — 80069 RENAL FUNCTION PANEL: CPT

## 2024-04-15 PROCEDURE — 2500000004 HC RX 250 GENERAL PHARMACY W/ HCPCS (ALT 636 FOR OP/ED)

## 2024-04-15 PROCEDURE — 85025 COMPLETE CBC W/AUTO DIFF WBC: CPT

## 2024-04-15 PROCEDURE — 82947 ASSAY GLUCOSE BLOOD QUANT: CPT

## 2024-04-15 PROCEDURE — 83735 ASSAY OF MAGNESIUM: CPT

## 2024-04-15 PROCEDURE — 2500000005 HC RX 250 GENERAL PHARMACY W/O HCPCS: Performed by: STUDENT IN AN ORGANIZED HEALTH CARE EDUCATION/TRAINING PROGRAM

## 2024-04-15 PROCEDURE — 99024 POSTOP FOLLOW-UP VISIT: CPT | Performed by: NEUROLOGICAL SURGERY

## 2024-04-15 PROCEDURE — 2020000001 HC ICU ROOM DAILY

## 2024-04-15 RX ORDER — TROPICAMIDE 10 MG/ML
1 SOLUTION/ DROPS OPHTHALMIC ONCE
Status: DISCONTINUED | OUTPATIENT
Start: 2024-04-15 | End: 2024-04-17 | Stop reason: HOSPADM

## 2024-04-15 RX ORDER — ACETAZOLAMIDE 250 MG/1
500 TABLET ORAL 2 TIMES DAILY
Status: DISCONTINUED | OUTPATIENT
Start: 2024-04-15 | End: 2024-04-17 | Stop reason: HOSPADM

## 2024-04-15 RX ORDER — PHENYLEPHRINE HYDROCHLORIDE 25 MG/ML
1 SOLUTION/ DROPS OPHTHALMIC
Status: ACTIVE | OUTPATIENT
Start: 2024-04-15 | End: 2024-04-15

## 2024-04-15 RX ORDER — HEPARIN SODIUM 5000 [USP'U]/ML
5000 INJECTION, SOLUTION INTRAVENOUS; SUBCUTANEOUS EVERY 8 HOURS
Status: DISCONTINUED | OUTPATIENT
Start: 2024-04-15 | End: 2024-04-17 | Stop reason: HOSPADM

## 2024-04-15 RX ADMIN — OXYCODONE HYDROCHLORIDE 5 MG: 5 TABLET ORAL at 03:25

## 2024-04-15 RX ADMIN — DEXAMETHASONE SODIUM PHOSPHATE 2 MG: 4 INJECTION INTRA-ARTICULAR; INTRALESIONAL; INTRAMUSCULAR; INTRAVENOUS; SOFT TISSUE at 07:43

## 2024-04-15 RX ADMIN — CEFAZOLIN SODIUM 2 G: 2 INJECTION, SOLUTION INTRAVENOUS at 10:09

## 2024-04-15 RX ADMIN — ACETAMINOPHEN 650 MG: 325 TABLET ORAL at 23:59

## 2024-04-15 RX ADMIN — ACETAMINOPHEN 650 MG: 325 TABLET ORAL at 07:43

## 2024-04-15 RX ADMIN — CEFAZOLIN SODIUM 2 G: 2 INJECTION, SOLUTION INTRAVENOUS at 18:04

## 2024-04-15 RX ADMIN — HEPARIN SODIUM 5000 UNITS: 5000 INJECTION INTRAVENOUS; SUBCUTANEOUS at 20:41

## 2024-04-15 RX ADMIN — DEXAMETHASONE SODIUM PHOSPHATE 2 MG: 4 INJECTION INTRA-ARTICULAR; INTRALESIONAL; INTRAMUSCULAR; INTRAVENOUS; SOFT TISSUE at 00:45

## 2024-04-15 RX ADMIN — DEXAMETHASONE SODIUM PHOSPHATE 2 MG: 4 INJECTION INTRA-ARTICULAR; INTRALESIONAL; INTRAMUSCULAR; INTRAVENOUS; SOFT TISSUE at 23:59

## 2024-04-15 RX ADMIN — CEFAZOLIN SODIUM 2 G: 2 INJECTION, SOLUTION INTRAVENOUS at 03:51

## 2024-04-15 RX ADMIN — AMLODIPINE BESYLATE 10 MG: 10 TABLET ORAL at 08:37

## 2024-04-15 RX ADMIN — DEXAMETHASONE SODIUM PHOSPHATE 2 MG: 4 INJECTION INTRA-ARTICULAR; INTRALESIONAL; INTRAMUSCULAR; INTRAVENOUS; SOFT TISSUE at 16:27

## 2024-04-15 RX ADMIN — ACETAMINOPHEN 650 MG: 325 TABLET ORAL at 18:04

## 2024-04-15 RX ADMIN — MAGNESIUM SULFATE HEPTAHYDRATE 2 G: 40 INJECTION, SOLUTION INTRAVENOUS at 07:43

## 2024-04-15 RX ADMIN — ACETAMINOPHEN 650 MG: 325 TABLET ORAL at 12:19

## 2024-04-15 RX ADMIN — PANTOPRAZOLE SODIUM 40 MG: 40 TABLET, DELAYED RELEASE ORAL at 08:37

## 2024-04-15 RX ADMIN — ACETAMINOPHEN 650 MG: 325 TABLET ORAL at 03:26

## 2024-04-15 RX ADMIN — SODIUM CHLORIDE 100 ML/HR: 9 INJECTION, SOLUTION INTRAVENOUS at 00:46

## 2024-04-15 RX ADMIN — ACETAZOLAMIDE 500 MG: 250 TABLET ORAL at 20:38

## 2024-04-15 RX ADMIN — CARBOXYMETHYLCELLULOSE SODIUM 1 DROP: 5 SOLUTION/ DROPS OPHTHALMIC at 20:39

## 2024-04-15 RX ADMIN — OXYCODONE HYDROCHLORIDE 5 MG: 5 TABLET ORAL at 21:59

## 2024-04-15 RX ADMIN — SODIUM CHLORIDE 100 ML/HR: 9 INJECTION, SOLUTION INTRAVENOUS at 03:51

## 2024-04-15 RX ADMIN — CARBOXYMETHYLCELLULOSE SODIUM 1 DROP: 5 SOLUTION/ DROPS OPHTHALMIC at 13:00

## 2024-04-15 ASSESSMENT — COGNITIVE AND FUNCTIONAL STATUS - GENERAL
DAILY ACTIVITIY SCORE: 24
MOBILITY SCORE: 24

## 2024-04-15 ASSESSMENT — PAIN - FUNCTIONAL ASSESSMENT
PAIN_FUNCTIONAL_ASSESSMENT: 0-10

## 2024-04-15 ASSESSMENT — PAIN DESCRIPTION - LOCATION
LOCATION: HEAD
LOCATION: HEAD

## 2024-04-15 ASSESSMENT — PAIN SCALES - GENERAL
PAINLEVEL_OUTOF10: 2
PAINLEVEL_OUTOF10: 3
PAINLEVEL_OUTOF10: 0 - NO PAIN
PAINLEVEL_OUTOF10: 4
PAINLEVEL_OUTOF10: 5 - MODERATE PAIN
PAINLEVEL_OUTOF10: 0 - NO PAIN

## 2024-04-15 NOTE — PROGRESS NOTES
"Radha Holland is a 48 y.o. female on day 10 of admission presenting with Brain mass.    Subjective   No acute events overnight    Objective     Physical Exam  Aox3  Fcx4 5/5  EVD site cdi  Incision c/d/i    Last Recorded Vitals  Blood pressure 118/83, pulse 60, temperature 36 °C (96.8 °F), temperature source Temporal, resp. rate (!) 27, height 1.626 m (5' 4.02\"), weight 104 kg (229 lb 4.5 oz), SpO2 100%.  Intake/Output last 3 Shifts:  I/O last 3 completed shifts:  In: 2760 (26.5 mL/kg) [P.O.:2760]  Out: 1482 (14.3 mL/kg) [Urine:1250 (0.3 mL/kg/hr); Drains:232]  Weight: 104 kg     Relevant Results           This patient currently has cardiac telemetry ordered; if you would like to modify or discontinue the telemetry order, click here to go to the orders activity to modify/discontinue the order.                 Assessment/Plan   Principal Problem:    Brain mass  Active Problems:    HTN (hypertension)    VERN (obstructive sleep apnea)    Asthma (HHS-HCC)    Obstructive hydrocephalus (Multi)    48 F h/o arthritis, HTN, VERN (on CPAP), prior ICH (?), p/w 1 year HA, 4d worsening HA, n/v CTH 3cm intraventricular mass originating from septum, MRI Brain cystic lateral vent mass, MRA neg, 4/5 CT CAP neg    4/9 s/p R crani for colloid cyst fenestration, RF EVD placement. CTH POC, cath in posn  4/12 EVD clamped, opened x1 for HA and increased ICPs    Plan:  -NSU  -EVD clamped  -shunt today  -dex taper to off over 1 week  -ancef while EVD is in   -PTOT-OK to work with and mobilize with EVD in  -SCDs, SQH      Ricardo Jordan MD      "

## 2024-04-15 NOTE — PROGRESS NOTES
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 Has persistent HA, stable intensity, EVD unclamped x1. ICP 4-25.     Objective   Vitals 24 hour ranges:  Heart Rate:  [58-91]   Temp:  [36 °C (96.8 °F)-36.1 °C (97 °F)]   Resp:  [12-27]   BP: (108-145)/(71-94)   SpO2:  [98 %-100 %]         Intake/Output for last 24 hours:    Intake/Output Summary (Last 24 hours) at 4/15/2024 0649  Last data filed at 4/15/2024 0412  Gross per 24 hour   Intake 803.33 ml   Output 281 ml   Net 522.33 ml         Physical Exam:  NEURO:  Alert, oriented x4, follows commands  EOS, PERRL, EOMI, VFF  VELIZ 5/5 strength, no drift, no ataxia  CV:  RRR on telemetry, NSR  RESP:  Regular, unlabored on RA  :  external catheter in place  GI:  Abdomen NT/ND, soft  SKIN:  Cranial incision CDI    Medications  Scheduled:  acetaminophen, 650 mg, oral, q6h  amLODIPine, 10 mg, oral, Daily  ceFAZolin, 2 g, intravenous, q8h  dexAMETHasone, 2 mg, intravenous, q8h  erythromycin, 1 cm, Both Eyes, Nightly  insulin lispro, 0-5 Units, subcutaneous, TID with meals  [Held by provider] losartan, 25 mg, oral, Daily  artificial tears, 1 drop, Both Eyes, 4x daily  pantoprazole, 40 mg, oral, Daily before breakfast   Or  pantoprazole, 40 mg, intravenous, Daily before breakfast  polyethylene glycol, 17 g, oral, Daily  scopolamine, 1 patch, transdermal, q72h  sennosides-docusate sodium, 2 tablet, oral, BID    PRN:  PRN medications: albuterol, benzocaine-menthol, calcium gluconate, calcium gluconate, cyclobenzaprine, dextrose,  dextrose, diphenhydrAMINE, glucagon, glucagon, hydrALAZINE, HYDROmorphone, labetaloL, magnesium sulfate, magnesium sulfate, melatonin, naloxone, ondansetron **OR** ondansetron, oxyCODONE, oxyCODONE, oxyCODONE, potassium chloride CR **OR** potassium chloride, potassium chloride CR **OR** potassium chloride, potassium chloride, promethazine, promethazine **OR** promethazine     Continuous:  sodium chloride 0.9%, 100 mL/hr, Last Rate: 100 mL/hr (04/15/24 0351)      Imaging Results  MR brain w and wo IV contrast   Final Result   Postoperative changes status post resection or debulking of   previously noted midline mass lesion. Minimal peripheral rim   enhancement in the operative region could be on a postoperative basis   although a residual component of wall of previously noted cystic   appearing mass is difficult to entirely exclude.  Please correlate   clinically and consider follow-up exam if clinically indicated.        Increased susceptibility along the ventral aspect of the brainstem   may be on the basis of superficial siderosis-please correlate   clinically.        High-resolution imaging through the cerebral aqueduct is limited due   to patient motion with CSF flow through the cerebral aqueduct not   demonstrated on cardiac gated phase contrast imaging although this   may be on a technical basis. Given the presence of externalized   ventricular catheter please correlate clinically and consider further   follow-up exam if clinically necessary.        MACRO:   None        Signed by: Luis Nelson 4/14/2024 2:22 PM   Dictation workstation:   [id number]      CT head wo IV contrast   Final Result   Redemonstration of postoperative changes from bifrontal craniotomy   with persistent but mildly improved pneumocephalus.        Redemonstration of right frontal approach ventriculostomy shunt   catheter, unchanged in position. There has been interval resolution   of intraventricular air and as a result decreased caliber of  the   frontal horns of the lateral ventricles.        MACRO:   None        Signed by: Cris Linder 4/13/2024 12:57 PM   Dictation workstation:   [id number]      CT head wo IV contrast   Final Result   1. Postsurgical changes consistent with bifrontal frontal craniotomy   and right frontal approach external ventricular drain placement.   Postsurgical changes as described in detail above.   2. Interval development of mild right-to-left midline shift,   measuring about 0.4 cm of the level of the septum pellucidum.        I personally reviewed the images/study and I agree with the findings   as stated by Jimmy Anguiano MD.        MACRO:   None        Signed by: Cris Linder 4/10/2024 7:04 AM   Dictation workstation:   [id number]      CT head wo IV contrast volumetric surgical planning   Final Result   1. Redemonstration of a well-circumscribed lesion of the superior   aspect of the 3rd ventricle, which can be correlated to the findings   seen on MRI brain 04/05/2024.   2. No evidence of acute cortical infarct or intracranial hemorrhage.        I personally reviewed the images/study and I agree with the findings   as stated by Jimmy Anguiano MD.        MACRO:   None        Signed by: Cris Linder 4/9/2024 7:29 AM   Dictation workstation:   [id number]      CT chest abdomen pelvis w IV contrast   Final Result   CHEST:   1.  No evidence of primary malignancy/metastatic disease in the chest.        ABDOMEN-PELVIS:   1.  No evidence of primary malignancy/metastatic disease in the   abdomen/pelvis.        I personally reviewed the images/study and I agree with the findings   as stated by Dr. Herminio Love. This study was interpreted at   [place], [place].        MACRO:   None        Signed by: Stevenson Dumont 4/5/2024 5:14 PM   Dictation workstation:   [id number]      XR chest 1 view   Final Result   Bibasilar atelectasis without evidence of acute cardiopulmonary    process.        I personally reviewed the images/study and I agree with Madelyn Paz DO's (radiology resident) findings as stated. This study   was interpreted at [facility],   [place], [place].        MACRO:   None        Signed by: David Torres 4/5/2024 8:15 AM   Dictation workstation:   [id number]      MR brain w and wo IV contrast   Final Result   MRI Brain:        Isolated, circumscribed T1/T2 hyperintense lesion in the superior   aspect of the 3rd ventricle measuring up to 2.9 x 2.8 x 2.9 cm.   Leading differential consideration is a colloid cyst. A focus of   blooming artifact along the superior margin of the cyst is favored to   reflect dystrophic calcification. Neurocysticercosis could   potentially have a similar imaging appearance. Lack of fat   suppression makes dermoid less likely. No evidence of obstructive   hydrocephalus at this time. No evidence of acute intracranial   hemorrhage.        MRA:        No evidence of source vessel arterial occlusion, aneurysm, or   vascular malformation.        I personally reviewed the images/study and I agree with the findings   as stated by resident physician Dr. Sebastián Redmond.        MACRO:   None        Signed by: Yuriy Manning 4/5/2024 8:31 AM   Dictation workstation:   [id number]      MR angio head wo IV contrast   Final Result   MRI Brain:        Isolated, circumscribed T1/T2 hyperintense lesion in the superior   aspect of the 3rd ventricle measuring up to 2.9 x 2.8 x 2.9 cm.   Leading differential consideration is a colloid cyst. A focus of   blooming artifact along the superior margin of the cyst is favored to   reflect dystrophic calcification. Neurocysticercosis could   potentially have a similar imaging appearance. Lack of fat   suppression makes dermoid less likely. No evidence of obstructive   hydrocephalus at this time. No evidence of acute intracranial   hemorrhage.        MRA:        No evidence of  source vessel arterial occlusion, aneurysm, or   vascular malformation.        I personally reviewed the images/study and I agree with the findings   as stated by resident physician Dr. Sebastián Redmond.        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, EVD placement   #intracranial hypertension  Assessment:  - Neurologically stable POD 6  - EVD unclamped sustained HA, 24h output 81, 10 shift  Plan:  - NSU  - Q1H neuro checks  - EVD at 10 unclamped for inc ICP, VPS planned 4/16 given symptomatic hydrocephalus  - Con't ancef while EVD in place  - Dex 2q8 today  - Pain: acetaminophen, oxycodone, hydromorphone PRN  - Nausea: ondansetron, phenergan, scopolamine patch  - PT/OT - low intensity, con't to mobilize with EVD in place  - optho consulted      CARDIOVASCULAR:  #HTN  Assessment:  - Stable, at goal   Plan:  - Continue to monitor on telemetry  - On home amlodipine 10mg daily, losartan 25qd  - Goal SBP < 160 - Nicardipine, Hydralazine and Labetalol PRN     RESPIRATORY:  #VERN on CPAP  Assessment:  - Stable on room air   Plan:  - Continuous pulse oximetry   - O2 PRN to maintain SpO2 > 94%, wean as tolerated  - Incentive spirometry while awake   - CPAP at night     RENAL/:  #No active issues  Assessment:  Results from last 72 hours   Lab Units 04/15/24  0255 04/14/24  0144   BUN mg/dL 15 14   CREATININE mg/dL 0.53 0.66       Plan:  Monitor with daily RFP    FEN/GI:  #Hyponatremia (resolved)  Assessment:  Last BM Date: 04/14/24  Stable Na 136  Plan:  Monitor and replace electrolytes per protocol  Daily RFP  Diet: regular, npo at midnight for OR tomorrow  Bowel Regimen: Docusate-Senna, Miralax    ENDOCRINE:  #No active issues  Assessment:  Results from last 7 days   Lab Units 04/15/24  0255 04/14/24  1701 04/14/24  1155 04/14/24  0804 04/14/24  0144 04/13/24  1515  24  1128 24  0723   POCT GLUCOSE mg/dL  --  148* 109* 116*  --  127* 126* 109*   GLUCOSE mg/dL 121*  --   --   --  116*  --   --   --       Plan:  On dex, PPI, SSI    HEMATOLOGY:  #anemia  Assessment:  Baseline Hgb: 13.1  Baseline Plts: 276  Stable H/H  Results from last 7 days   Lab Units 04/15/24  0255 24  0144   HEMOGLOBIN g/dL 11.1* 11.6*   HEMATOCRIT % 31.5* 33.6*   PLATELETS AUTO x10*3/uL 324 319     Plan:  Continue to monitor with daily CBC     INFECTIOUS DISEASE:  #Leukocytosis  Assessment:  Improving, afebrile no active s/sx of infection  Results from last 7 days   Lab Units 04/15/24  0255 24  0144   WBC AUTO x10*3/uL 15.4* 15.7*     Temp (24hrs), Av.1 °C (96.9 °F), Min:36 °C (96.8 °F), Max:36.1 °C (97 °F)  Plan:  Continue to monitor for s/sx of infection and daily cbc  Pan culture for temperature > 38.4 C    MUSCULOSKELETAL:  No acute issues    SKIN:  EVD and cranial incision c/d/i  Turns and skin care per NSU protocol    ACCESS:  PIV x3    PROPHYLAXIS:  DVT/VTE: SCDs, SQ heparin   GI: Protonix    RESTRAINTS:  Not indicated/Patient does not meet criteria for restraints    DO UBALDO Go PGY-2  Neuroscience Intensive Care       Plan of care to be discussed with neurocritical care attending    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  I have reviewed the patient's laboratory, radiographic, and clinical data.  I have spent 30 minutes providing critical care for the patient.      LIDIA Merritt M.D.

## 2024-04-15 NOTE — CARE PLAN
The patient's goals for the shift include      The clinical goals for the shift include Patient will have ICP <20 this shift      Problem: Discharge Planning  Goal: Discharge to home or other facility with appropriate resources  Outcome: Progressing     Problem: Chronic Conditions and Co-morbidities  Goal: Patient's chronic conditions and co-morbidity symptoms are monitored and maintained or improved  Outcome: Progressing     Problem: Fall/Injury  Goal: Not fall by end of shift  Outcome: Progressing  Goal: Be free from injury by end of the shift  Outcome: Progressing  Goal: Verbalize understanding of personal risk factors for fall in the hospital  Outcome: Progressing  Goal: Verbalize understanding of risk factor reduction measures to prevent injury from fall in the home  Outcome: Progressing  Goal: Use assistive devices by end of the shift  Outcome: Progressing  Goal: Pace activities to prevent fatigue by end of the shift  Outcome: Progressing     Problem: Pain  Goal: Takes deep breaths with improved pain control throughout the shift  Outcome: Progressing  Goal: Turns in bed with improved pain control throughout the shift  Outcome: Progressing  Goal: Walks with improved pain control throughout the shift  Outcome: Progressing  Goal: Performs ADL's with improved pain control throughout shift  Outcome: Progressing  Goal: Participates in PT with improved pain control throughout the shift  Outcome: Progressing  Goal: Free from opioid side effects throughout the shift  Outcome: Progressing  Goal: Free from acute confusion related to pain meds throughout the shift  Outcome: Progressing

## 2024-04-15 NOTE — CONSULTS
History Of Present Illness  Radha Holland is a 48 y.o. 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 Now NSG reports they are having trouble removing drain w symptoms of worsening headache and intracranial pressure (ICP) spike to 30s w clamping attempts, ophtho consulted to assess for papilledema.   Initial symptoms on presentation were headache, nausea vomiting. Relief of symptoms status post (s/p) resection with EVD, however when NSG attempts to clamp, she gets recurrence of headaches along w intracranial pressure (ICP) spikes. She states she still gets daily headaches in the AM, which improve throughout the day and occasional nausea, however somewhat improved from severe symptoms she had before resection. She does report occasional blurry vision worse when bending over, but somewhat improved since surgery, denies pulsatile tinnitus, other blurry vision.     Past Medical History  She has no past medical history on file.    Exam  Base Eye Exam       Visual Acuity (Snellen - Linear)         Right Left    Near sc 20/20 20/20              Tonometry (Goldmann Applanation, 1:14 PM)         Right Left    Pressure 20 18              Pupils         Dark Light Shape React APD    Right 5 3 Round Brisk None    Left 5 3 Round Brisk None              Visual Fields (Counting fingers)         Left Right     Full Full              Extraocular Movement         Right Left     Full, Ortho Full, Ortho              Neuro/Psych       Oriented x3: Yes    Mood/Affect: Normal              Dilation       Both eyes: 1% Mydriacyl & 2.5% Jose  @ 1:15 PM                  Additional Tests       Color         Right Left    Ishihara 11/11 11/11                  Slit Lamp and Fundus Exam       External Exam         Right Left    External Normal Normal              Slit Lamp Exam         Right Left    Lids/Lashes Normal Normal     "Conjunctiva/Sclera White and quiet White and quiet    Cornea Clear Clear    Anterior Chamber Deep and quiet Deep and quiet    Iris Round and reactive Round and reactive    Lens Clear Clear    Anterior Vitreous Normal Normal              Fundus Exam         Right Left    Disc Normal Normal    C/D Ratio 0.5 0.5    Macula Normal Normal    Vessels Normal Normal    Periphery Normal Normal                       Last Recorded Vitals  Blood pressure 131/85, pulse 78, temperature 36.5 °C (97.7 °F), temperature source Temporal, resp. rate 18, height 1.626 m (5' 4.02\"), weight 104 kg (229 lb 11.5 oz), SpO2 99%.    Relevant Results    MR brain w and wo IV contrast    IMPRESSION:  MRI Brain:  Isolated, circumscribed T1/T2 hyperintense lesion in the superior  aspect of the 3rd ventricle measuring up to 2.9 x 2.8 x 2.9 cm.  Leading differential consideration is a colloid cyst. A focus of  blooming artifact along the superior margin of the cyst is favored to  reflect dystrophic calcification. Neurocysticercosis could  potentially have a similar imaging appearance. Lack of fat  suppression makes dermoid less likely. No evidence of obstructive  hydrocephalus at this time. No evidence of acute intracranial  hemorrhage.      MRA:  No evidence of source vessel arterial occlusion, aneurysm, or  vascular malformation.        MR angio head wo IV contrast    IMPRESSION:  MRI Brain:    Isolated, circumscribed T1/T2 hyperintense lesion in the superior  aspect of the 3rd ventricle measuring up to 2.9 x 2.8 x 2.9 cm.  Leading differential consideration is a colloid cyst. A focus of  blooming artifact along the superior margin of the cyst is favored to  reflect dystrophic calcification. Neurocysticercosis could  potentially have a similar imaging appearance. Lack of fat  suppression makes dermoid less likely. No evidence of obstructive  hydrocephalus at this time. No evidence of acute intracranial  hemorrhage.      MRA:  No evidence of source " vessel arterial occlusion, aneurysm, or  vascular malformation.      CT head wo IV contrast volumetric surgical planning    IMPRESSION:  1. Redemonstration of a well-circumscribed lesion of the superior  aspect of the 3rd ventricle, which can be correlated to the findings  seen on MRI brain 04/05/2024.  2. No evidence of acute cortical infarct or intracranial hemorrhage.      Surgical Pathology Exam        Component    Case Report    Surgical Pathology                                Case: [id number]                                  Authorizing Provider:  Solo Martinez MD       Collected:           04/09/2024 2909              Ordering Location:     [facility]       Received:            04/09/2024 2111                                     [facility]                                                             Pathologist:           Jose Miguel Lawrence MD                                                             Specimen:    BRAIN RESECTION, RIGHT INTRAVENTRICULAR TUMOR                                              FINAL DIAGNOSIS    RIGHT INTRAVENTRICULAR TUMOR, REMOVAL:  - COLLOID CYST WITH ORGANIZED HEMORRHAGE, CHRONIC INFLAMMATION, AND FIBROSIS; NO MALIGNANCY IDENTIFIED.                                           CT head wo IV contrast      IMPRESSION:  1. Postsurgical changes consistent with bifrontal frontal craniotomy  and right frontal approach external ventricular drain placement.  Postsurgical changes as described in detail above.  2. Interval development of mild right-to-left midline shift,  measuring about 0.4 cm of the level of the septum pellucidum.      MR brain w and wo IV contrast    MR BRAIN W AND WO IV CONTRAST;  4/14/2024 1:55 pm   IMPRESSION:  Postoperative changes status post resection or debulking of  previously noted midline mass lesion. Minimal peripheral rim  enhancement in the operative region could be on a postoperative basis  although a residual component of wall of previously  noted cystic  appearing mass is difficult to entirely exclude.  Please correlate  clinically and consider follow-up exam if clinically indicated.      Increased susceptibility along the ventral aspect of the brainstem  may be on the basis of superficial siderosis-please correlate  clinically.       Assessment/Plan     #Normal eye exam  -Normal vision testing and no abnormalities on slit lamp or dilated eye exam  -Of note, absence of nerve edema on eye exam is not sufficient to rule out ICH, if clinical concern, recommend further workup and treatment per primary team.       Tammie Wilson MD  PGY-3 Ophthalmology     Ophthalmology Adult Pager - [phone]  Ophthalmology Pediatrics Pager - [phone]    For adult follow-up appointments, call: [phone]  For pediatric follow-up appointments, call: [phone]      NOTE: This note is not finalized until attending reviews and signs.

## 2024-04-15 NOTE — ANESTHESIA PREPROCEDURE EVALUATION
Patient: Radha Holland    Procedure Information       Date/Time: 04/16/24 0715    Procedure: R  Shunt (possible left) (Right)    Location: [facility] OR 25 / [facility] OR    Surgeons: Solo Martinez MD          49 y/o female s/p R hemicraniectomy for tumor resection now presenting with obstructive hydrocephalus. Scheduled for OR 4/16 for  shunt.  Relevant Problems   Cardiac   (+) HTN (hypertension)      Pulmonary   (+) Asthma (HHS-HCC)   (+) VERN (obstructive sleep apnea)       Clinical information reviewed:   Tobacco  Allergies  Meds       Soc Hx        NPO Detail:  No data recorded     Physical Exam    Airway  Mallampati: III  TM distance: >3 FB  Neck ROM: full     Cardiovascular   Rhythm: regular  Rate: normal     Dental    Pulmonary   Breath sounds clear to auscultation     Abdominal      Other findings: HTN, VERN on CPAP      Anesthesia Plan    History of general anesthesia?: yes  History of complications of general anesthesia?: no    ASA 4     general     intravenous induction   Postoperative administration of opioids is intended.  Trial extubation is planned.    Vitals:    04/15/24 1200   BP: 131/85   Pulse: 78   Resp:    Temp:    SpO2: 99%       No past surgical history on file.  No past medical history on file.    Current Facility-Administered Medications:   •  acetaminophen (Tylenol) tablet 650 mg, 650 mg, oral, q6h, Ricardo Jordan MD, 650 mg at 04/15/24 1219  •  albuterol 90 mcg/actuation inhaler 2 puff, 2 puff, inhalation, q6h PRN, Ricardo Jordan MD  •  amLODIPine (Norvasc) tablet 10 mg, 10 mg, oral, Daily, Akshat March MD, 10 mg at 04/15/24 0837  •  benzocaine-menthol (Cepastat Sore Throat) 15-3.6 mg lozenge 1 lozenge, 1 lozenge, Mouth/Throat, q2h PRN, Ricardo Jordan MD  •  calcium gluconate in NS IV 1 g, 1 g, intravenous, q6h PRN, Chey Tapia APRN-CNP, Stopped at 04/10/24 0510  •  calcium gluconate in NS IV 2 g, 2 g, intravenous, q6h PRN, Nektarios Regina, APRN-CNP  •   ceFAZolin in dextrose (iso-os) (Ancef) IVPB 2 g, 2 g, intravenous, q8h, Ricardo Jordan MD, Stopped at 04/15/24 1039  •  cyclobenzaprine (Flexeril) tablet 10 mg, 10 mg, oral, TID PRN, Ricardo Jordan MD  •  dexAMETHasone (Decadron) injection 2 mg, 2 mg, intravenous, q8h, Akshat March MD, 2 mg at 04/15/24 0743  •  dextrose 50 % injection 12.5 g, 12.5 g, intravenous, q15 min PRN, Ricardo Jordan MD  •  dextrose 50 % injection 25 g, 25 g, intravenous, q15 min PRN, Ricardo Jordan MD  •  diphenhydrAMINE (BENADryl) injection 12.5 mg, 12.5 mg, intravenous, q6h PRN, Ricardo Jordan MD  •  erythromycin (Romycin) 5 mg/gram (0.5 %) ophthalmic ointment 1 cm, 1 cm, Both Eyes, Nightly, Ricardo Jordan MD, 1 cm at 04/14/24 2034  •  glucagon (Glucagen) injection 1 mg, 1 mg, intramuscular, q15 min PRN, Ricardo Jordan MD  •  glucagon (Glucagen) injection 1 mg, 1 mg, intramuscular, q15 min PRN, Ricardo Jordan MD  •  hydrALAZINE (Apresoline) injection 10 mg, 10 mg, intravenous, q20 min PRN, Ricardo Jordan MD, 10 mg at 04/09/24 2343  •  HYDROmorphone (Dilaudid) injection 0.2 mg, 0.2 mg, intravenous, q4h PRN, Ricardo Jordan MD, 0.2 mg at 04/10/24 1037  •  insulin lispro (HumaLOG) injection 0-5 Units, 0-5 Units, subcutaneous, TID with meals, Ricardo Jordan MD, 1 Units at 04/10/24 1715  •  labetaloL (Normodyne,Trandate) injection 10 mg, 10 mg, intravenous, q10 min PRN, Ricardo Jordan MD, 10 mg at 04/10/24 0051  •  [Held by provider] losartan (Cozaar) tablet 25 mg, 25 mg, oral, Daily, Chino M Dueñas, MD, 25 mg at 04/14/24 0805  •  lubricating eye drops ophthalmic solution 1 drop, 1 drop, Both Eyes, 4x daily, Ricardo Jordan MD, 1 drop at 04/15/24 1300  •  magnesium sulfate IV 2 g, 2 g, intravenous, q6h PRN, Chey Tapia APRN-CNP, Stopped at 04/15/24 0943  •  magnesium sulfate IV 4 g, 4 g, intravenous, q6h PRN, Chey Tapia APRN-CNP, Stopped at 04/10/24 0840  •  melatonin tablet 10 mg, 10 mg, oral, Nightly PRN, Vanessa Mena MD, 10 mg at  04/12/24 2051  •  naloxone (Narcan) injection 0.2 mg, 0.2 mg, intravenous, q5 min PRN, Ricardo Jordan MD  •  ondansetron (Zofran) tablet 4 mg, 4 mg, oral, q8h PRN **OR** ondansetron (Zofran) injection 4 mg, 4 mg, intravenous, q8h PRN, Ricardo Jordan MD, 4 mg at 04/14/24 1312  •  oxyCODONE (Roxicodone) immediate release tablet 10 mg, 10 mg, oral, q4h PRN, Ricardo Jordan MD, 10 mg at 04/13/24 0326  •  oxyCODONE (Roxicodone) immediate release tablet 2.5 mg, 2.5 mg, oral, q4h PRN, Ricardo Jordan MD  •  oxyCODONE (Roxicodone) immediate release tablet 5 mg, 5 mg, oral, q4h PRN, Ricardo Jordan MD, 5 mg at 04/15/24 0325  •  pantoprazole (ProtoNix) EC tablet 40 mg, 40 mg, oral, Daily before breakfast, 40 mg at 04/15/24 0837 **OR** pantoprazole (ProtoNix) injection 40 mg, 40 mg, intravenous, Daily before breakfast, Ricardo Jordan MD, 40 mg at 04/10/24 0607  •  polyethylene glycol (Glycolax, Miralax) packet 17 g, 17 g, oral, Daily, Ricardo Jordan MD, 17 g at 04/13/24 0831  •  potassium chloride CR (Klor-Con M20) ER tablet 20 mEq, 20 mEq, oral, q6h PRN **OR** potassium chloride (Klor-Con) packet 20 mEq, 20 mEq, oral, q6h PRN, Nektarios Kriaris, APRN-CNP  •  potassium chloride CR (Klor-Con M20) ER tablet 40 mEq, 40 mEq, oral, q6h PRN **OR** potassium chloride (Klor-Con) packet 40 mEq, 40 mEq, oral, q6h PRN, Nektarios Kriaris, APRN-CNP  •  potassium chloride 20 mEq in 100 mL IV premix, 20 mEq, intravenous, q6h PRN, Nektarios Kriaris, APRN-CNP, Stopped at 04/10/24 0641  •  promethazine (Phenergan) 12.5 mg in sodium chloride 0.9% 50 mL IV, 12.5 mg, intravenous, q6h PRN, CHANEL Jorgensen, Stopped at 04/10/24 1202  •  promethazine (Phenergan) tablet 25 mg, 25 mg, oral, q6h PRN **OR** promethazine (Phenergan) suppository 25 mg, 25 mg, rectal, q12h PRN, Ricardo Jordan MD  •  scopolamine (Transderm-Scop) patch 1 patch, 1 patch, transdermal, q72h, CHANEL Jorgensen, 1 patch at 04/13/24 0744  •  sennosides-docusate sodium  (Laurence-Colace) 8.6-50 mg per tablet 2 tablet, 2 tablet, oral, BID, Ricardo Jordan MD, 2 tablet at 04/13/24 2123  •  tropicamide (Mydriacyl) 1 % ophthalmic solution 1 drop, 1 drop, Both Eyes, Once, Yasmine Encarnacion, DO  Prior to Admission medications    Medication Sig Start Date End Date Taking? Authorizing Provider   albuterol 90 mcg/actuation inhaler Inhale 2 puffs every 6 hours if needed for wheezing.    Historical Provider, MD   amLODIPine (Norvasc) 10 mg tablet Take 1 tablet (10 mg) by mouth once daily.    Historical Provider, MD   butalbital-acetaminophen-caff -40 mg tablet Take 1 tablet by mouth every 4 hours if needed for headaches.    Historical Provider, MD   erythromycin (Romycin) 5 mg/gram (0.5 %) ophthalmic ointment Apply 1 Application to both eyes once daily at bedtime.    Historical Provider, MD   hydroCHLOROthiazide (Microzide) 12.5 mg tablet Take 1 tablet (12.5 mg) by mouth once daily.    Historical Provider, MD   losartan (Cozaar) 25 mg tablet Take 1 tablet (25 mg) by mouth once daily.    Historical Provider, MD   polyvinyl alcohol (Liquifilm Tears) 1.4 % ophthalmic solution Administer 1 drop into both eyes 4 times a day.    Historical Provider, MD     No Known Allergies  Social History     Tobacco Use   • Smoking status: Every Day     Current packs/day: 0.50     Average packs/day: 0.5 packs/day for 20.3 years (10.1 ttl pk-yrs)     Types: Cigarettes     Start date: 2004   • Smokeless tobacco: Never   Substance Use Topics   • Alcohol use: Not Currently     Alcohol/week: 2.0 standard drinks of alcohol     Types: 2 Cans of beer per week         Chemistry    Lab Results   Component Value Date/Time     04/15/2024 0255    K 4.1 04/15/2024 0255     04/15/2024 0255    CO2 25 04/15/2024 0255    BUN 15 04/15/2024 0255    CREATININE 0.53 04/15/2024 0255    Lab Results   Component Value Date/Time    CALCIUM 7.9 (L) 04/15/2024 0255          Lab Results   Component Value Date/Time    WBC 15.4 (H)  04/15/2024 0255    HGB 11.1 (L) 04/15/2024 0255    HCT 31.5 (L) 04/15/2024 0255     04/15/2024 0255     Lab Results   Component Value Date/Time    PROTIME 10.5 04/14/2024 1756    INR 0.9 04/14/2024 1756     Encounter Date: 04/05/24   Electrocardiogram, 12-lead PRN ACS symptoms   Result Value    Ventricular Rate 96    Atrial Rate 96    CA Interval 132    QRS Duration 76    QT Interval 408    QTC Calculation(Bazett) 515    P Axis 65    R Axis 43    T Axis 35    QRS Count 16    Q Onset 219    P Onset 153    P Offset 208    T Offset 423    QTC Fredericia 477    Narrative    Normal sinus rhythm  Prolonged QT  Abnormal ECG  No previous ECGs available  Confirmed by Kelvin Coelho ([id number]) on 4/10/2024 1:05:03 PM

## 2024-04-16 ENCOUNTER — ANESTHESIA (OUTPATIENT)
Dept: OPERATING ROOM | Facility: HOSPITAL | Age: 49
End: 2024-04-16
Payer: COMMERCIAL

## 2024-04-16 ENCOUNTER — APPOINTMENT (OUTPATIENT)
Dept: RADIOLOGY | Facility: HOSPITAL | Age: 49
End: 2024-04-16
Payer: COMMERCIAL

## 2024-04-16 LAB
ALBUMIN SERPL BCP-MCNC: 3.1 G/DL (ref 3.4–5)
ANION GAP SERPL CALC-SCNC: 10 MMOL/L (ref 10–20)
BASOPHILS # BLD AUTO: 0.03 X10*3/UL (ref 0–0.1)
BASOPHILS NFR BLD AUTO: 0.2 %
BUN SERPL-MCNC: 16 MG/DL (ref 6–23)
CA-I BLD-SCNC: 1.21 MMOL/L (ref 1.1–1.33)
CALCIUM SERPL-MCNC: 8.4 MG/DL (ref 8.6–10.6)
CHLORIDE SERPL-SCNC: 102 MMOL/L (ref 98–107)
CO2 SERPL-SCNC: 27 MMOL/L (ref 21–32)
CREAT SERPL-MCNC: 0.67 MG/DL (ref 0.5–1.05)
EGFRCR SERPLBLD CKD-EPI 2021: >90 ML/MIN/1.73M*2
EOSINOPHIL # BLD AUTO: 0 X10*3/UL (ref 0–0.7)
EOSINOPHIL NFR BLD AUTO: 0 %
ERYTHROCYTE [DISTWIDTH] IN BLOOD BY AUTOMATED COUNT: 13.4 % (ref 11.5–14.5)
GLUCOSE SERPL-MCNC: 136 MG/DL (ref 74–99)
HCT VFR BLD AUTO: 37 % (ref 36–46)
HGB BLD-MCNC: 12.4 G/DL (ref 12–16)
IMM GRANULOCYTES # BLD AUTO: 0.57 X10*3/UL (ref 0–0.7)
IMM GRANULOCYTES NFR BLD AUTO: 3.4 % (ref 0–0.9)
LYMPHOCYTES # BLD AUTO: 1.88 X10*3/UL (ref 1.2–4.8)
LYMPHOCYTES NFR BLD AUTO: 11.3 %
MAGNESIUM SERPL-MCNC: 2 MG/DL (ref 1.6–2.4)
MCH RBC QN AUTO: 29.2 PG (ref 26–34)
MCHC RBC AUTO-ENTMCNC: 33.5 G/DL (ref 32–36)
MCV RBC AUTO: 87 FL (ref 80–100)
MONOCYTES # BLD AUTO: 0.9 X10*3/UL (ref 0.1–1)
MONOCYTES NFR BLD AUTO: 5.4 %
NEUTROPHILS # BLD AUTO: 13.23 X10*3/UL (ref 1.2–7.7)
NEUTROPHILS NFR BLD AUTO: 79.7 %
NRBC BLD-RTO: 0 /100 WBCS (ref 0–0)
PHOSPHATE SERPL-MCNC: 3.1 MG/DL (ref 2.5–4.9)
PLATELET # BLD AUTO: 364 X10*3/UL (ref 150–450)
POTASSIUM SERPL-SCNC: 4.4 MMOL/L (ref 3.5–5.3)
RBC # BLD AUTO: 4.24 X10*6/UL (ref 4–5.2)
SODIUM SERPL-SCNC: 135 MMOL/L (ref 136–145)
WBC # BLD AUTO: 16.6 X10*3/UL (ref 4.4–11.3)

## 2024-04-16 PROCEDURE — 36415 COLL VENOUS BLD VENIPUNCTURE: CPT

## 2024-04-16 PROCEDURE — 82330 ASSAY OF CALCIUM: CPT

## 2024-04-16 PROCEDURE — 2500000001 HC RX 250 WO HCPCS SELF ADMINISTERED DRUGS (ALT 637 FOR MEDICARE OP): Performed by: STUDENT IN AN ORGANIZED HEALTH CARE EDUCATION/TRAINING PROGRAM

## 2024-04-16 PROCEDURE — 2500000001 HC RX 250 WO HCPCS SELF ADMINISTERED DRUGS (ALT 637 FOR MEDICARE OP)

## 2024-04-16 PROCEDURE — 2500000005 HC RX 250 GENERAL PHARMACY W/O HCPCS: Performed by: STUDENT IN AN ORGANIZED HEALTH CARE EDUCATION/TRAINING PROGRAM

## 2024-04-16 PROCEDURE — 70450 CT HEAD/BRAIN W/O DYE: CPT | Performed by: RADIOLOGY

## 2024-04-16 PROCEDURE — 85025 COMPLETE CBC W/AUTO DIFF WBC: CPT

## 2024-04-16 PROCEDURE — 2500000004 HC RX 250 GENERAL PHARMACY W/ HCPCS (ALT 636 FOR OP/ED)

## 2024-04-16 PROCEDURE — 83735 ASSAY OF MAGNESIUM: CPT

## 2024-04-16 PROCEDURE — 70450 CT HEAD/BRAIN W/O DYE: CPT

## 2024-04-16 PROCEDURE — 2060000001 HC INTERMEDIATE ICU ROOM DAILY

## 2024-04-16 PROCEDURE — 2500000004 HC RX 250 GENERAL PHARMACY W/ HCPCS (ALT 636 FOR OP/ED): Performed by: REGISTERED NURSE

## 2024-04-16 PROCEDURE — 80069 RENAL FUNCTION PANEL: CPT

## 2024-04-16 RX ADMIN — SENNOSIDES AND DOCUSATE SODIUM 2 TABLET: 8.6; 5 TABLET ORAL at 20:20

## 2024-04-16 RX ADMIN — CARBOXYMETHYLCELLULOSE SODIUM 1 DROP: 5 SOLUTION/ DROPS OPHTHALMIC at 17:00

## 2024-04-16 RX ADMIN — PANTOPRAZOLE SODIUM 40 MG: 40 TABLET, DELAYED RELEASE ORAL at 06:01

## 2024-04-16 RX ADMIN — ERYTHROMYCIN 1 CM: 5 OINTMENT OPHTHALMIC at 21:00

## 2024-04-16 RX ADMIN — HEPARIN SODIUM 5000 UNITS: 5000 INJECTION INTRAVENOUS; SUBCUTANEOUS at 10:30

## 2024-04-16 RX ADMIN — ACETAZOLAMIDE 500 MG: 250 TABLET ORAL at 08:49

## 2024-04-16 RX ADMIN — ACETAMINOPHEN 650 MG: 325 TABLET ORAL at 20:21

## 2024-04-16 RX ADMIN — HEPARIN SODIUM 5000 UNITS: 5000 INJECTION INTRAVENOUS; SUBCUTANEOUS at 18:30

## 2024-04-16 RX ADMIN — CARBOXYMETHYLCELLULOSE SODIUM 1 DROP: 5 SOLUTION/ DROPS OPHTHALMIC at 07:00

## 2024-04-16 RX ADMIN — AMLODIPINE BESYLATE 10 MG: 10 TABLET ORAL at 08:50

## 2024-04-16 RX ADMIN — ACETAZOLAMIDE 500 MG: 250 TABLET ORAL at 20:20

## 2024-04-16 RX ADMIN — HEPARIN SODIUM 5000 UNITS: 5000 INJECTION INTRAVENOUS; SUBCUTANEOUS at 02:07

## 2024-04-16 RX ADMIN — CARBOXYMETHYLCELLULOSE SODIUM 1 DROP: 5 SOLUTION/ DROPS OPHTHALMIC at 21:00

## 2024-04-16 RX ADMIN — ACETAMINOPHEN 650 MG: 325 TABLET ORAL at 13:12

## 2024-04-16 RX ADMIN — SENNOSIDES AND DOCUSATE SODIUM 2 TABLET: 8.6; 5 TABLET ORAL at 08:49

## 2024-04-16 RX ADMIN — ACETAMINOPHEN 650 MG: 325 TABLET ORAL at 06:01

## 2024-04-16 RX ADMIN — SCOPOLAMINE 1 PATCH: 1.5 PATCH, EXTENDED RELEASE TRANSDERMAL at 02:06

## 2024-04-16 RX ADMIN — CARBOXYMETHYLCELLULOSE SODIUM 1 DROP: 5 SOLUTION/ DROPS OPHTHALMIC at 13:00

## 2024-04-16 ASSESSMENT — PAIN - FUNCTIONAL ASSESSMENT
PAIN_FUNCTIONAL_ASSESSMENT: 0-10

## 2024-04-16 ASSESSMENT — PAIN SCALES - GENERAL
PAINLEVEL_OUTOF10: 0 - NO PAIN

## 2024-04-16 NOTE — PROGRESS NOTES
Social Work Discharge Planning note:    -Patient discussed during interdisciplinary rounds.   -Team members present: NP, PA, and SW  -Plan per medical team: Pt is not medically ready for discharge. Pt is still SANDY status. Unclear whether or not Pt will need  shunt.   -Payer: [employer].   -Status: Inpatient  -Discharge disposition: Pt continues to plan for out patient PT/OT through [facility] in [place].   -Potential Barriers: none  -Anticipated Date of Discharge:  4/18/2024    SALEEM Rm, LSW

## 2024-04-16 NOTE — PROGRESS NOTES
"Radha Holland is a 48 y.o. female on day 11 of admission presenting with Brain mass.    Subjective   No acute events overnight. Mild headache which subsequently resolved.    Objective     Physical Exam  Aox3  Fcx4 5/5  EVD site cdi  Incision c/d/i    Last Recorded Vitals  Blood pressure (!) 153/106, pulse 66, temperature 36.1 °C (97 °F), temperature source Temporal, resp. rate 15, height 1.626 m (5' 4.02\"), weight 104 kg (229 lb 11.5 oz), SpO2 100%.  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 CTH POC, cath in posn  4/12 EVD clamped, opened x1 for HA and increased ICPs  4/15 EVD dc'd, diamox initiated  4/16 CTH stable    Plan:  -SANDY  -Dc Dex  -Diamox 500BID  -PTOT-OK to work with and mobilize  -SCDs, SQH    Discharge pending HA control      Olena Villegas MD      "

## 2024-04-16 NOTE — CARE PLAN
Problem: Fall/Injury  Goal: Not fall by end of shift  Outcome: Progressing  Goal: Be free from injury by end of the shift  Outcome: Progressing  Goal: Verbalize understanding of personal risk factors for fall in the hospital  Outcome: Progressing  Goal: Verbalize understanding of risk factor reduction measures to prevent injury from fall in the home  Outcome: Progressing  Goal: Use assistive devices by end of the shift  Outcome: Progressing  Goal: Pace activities to prevent fatigue by end of the shift  Outcome: Progressing     Problem: Pain  Goal: Turns in bed with improved pain control throughout the shift  Outcome: Progressing  Goal: Walks with improved pain control throughout the shift  Outcome: Progressing  Goal: Performs ADL's with improved pain control throughout shift  Outcome: Progressing  Goal: Free from opioid side effects throughout the shift  Outcome: Progressing  Goal: Free from acute confusion related to pain meds throughout the shift  Outcome: Progressing

## 2024-04-17 VITALS
BODY MASS INDEX: 36.66 KG/M2 | WEIGHT: 214.73 LBS | DIASTOLIC BLOOD PRESSURE: 76 MMHG | HEART RATE: 76 BPM | SYSTOLIC BLOOD PRESSURE: 130 MMHG | HEIGHT: 64 IN | RESPIRATION RATE: 16 BRPM | OXYGEN SATURATION: 99 % | TEMPERATURE: 97.9 F

## 2024-04-17 DIAGNOSIS — G93.89 BRAIN MASS: Primary | ICD-10-CM

## 2024-04-17 LAB
ALBUMIN SERPL BCP-MCNC: 2.8 G/DL (ref 3.4–5)
ANION GAP SERPL CALC-SCNC: 12 MMOL/L (ref 10–20)
BUN SERPL-MCNC: 16 MG/DL (ref 6–23)
CALCIUM SERPL-MCNC: 7.5 MG/DL (ref 8.6–10.6)
CHLORIDE SERPL-SCNC: 111 MMOL/L (ref 98–107)
CO2 SERPL-SCNC: 17 MMOL/L (ref 21–32)
CREAT SERPL-MCNC: 0.78 MG/DL (ref 0.5–1.05)
EGFRCR SERPLBLD CKD-EPI 2021: >90 ML/MIN/1.73M*2
ERYTHROCYTE [DISTWIDTH] IN BLOOD BY AUTOMATED COUNT: 13.8 % (ref 11.5–14.5)
GLUCOSE BLD MANUAL STRIP-MCNC: 126 MG/DL (ref 74–99)
GLUCOSE BLD MANUAL STRIP-MCNC: 95 MG/DL (ref 74–99)
GLUCOSE SERPL-MCNC: 86 MG/DL (ref 74–99)
HCT VFR BLD AUTO: 38.4 % (ref 36–46)
HGB BLD-MCNC: 12.9 G/DL (ref 12–16)
MCH RBC QN AUTO: 29.5 PG (ref 26–34)
MCHC RBC AUTO-ENTMCNC: 33.6 G/DL (ref 32–36)
MCV RBC AUTO: 88 FL (ref 80–100)
NRBC BLD-RTO: 0 /100 WBCS (ref 0–0)
PHOSPHATE SERPL-MCNC: 3.6 MG/DL (ref 2.5–4.9)
PLATELET # BLD AUTO: 339 X10*3/UL (ref 150–450)
POTASSIUM SERPL-SCNC: 4 MMOL/L (ref 3.5–5.3)
RBC # BLD AUTO: 4.38 X10*6/UL (ref 4–5.2)
SODIUM SERPL-SCNC: 136 MMOL/L (ref 136–145)
WBC # BLD AUTO: 16.6 X10*3/UL (ref 4.4–11.3)

## 2024-04-17 PROCEDURE — 2500000001 HC RX 250 WO HCPCS SELF ADMINISTERED DRUGS (ALT 637 FOR MEDICARE OP): Performed by: STUDENT IN AN ORGANIZED HEALTH CARE EDUCATION/TRAINING PROGRAM

## 2024-04-17 PROCEDURE — 99231 SBSQ HOSP IP/OBS SF/LOW 25: CPT | Performed by: PHYSICIAN ASSISTANT

## 2024-04-17 PROCEDURE — 84100 ASSAY OF PHOSPHORUS: CPT | Performed by: PHYSICIAN ASSISTANT

## 2024-04-17 PROCEDURE — 36415 COLL VENOUS BLD VENIPUNCTURE: CPT | Performed by: PHYSICIAN ASSISTANT

## 2024-04-17 PROCEDURE — 2500000001 HC RX 250 WO HCPCS SELF ADMINISTERED DRUGS (ALT 637 FOR MEDICARE OP): Performed by: PHYSICIAN ASSISTANT

## 2024-04-17 PROCEDURE — 2500000004 HC RX 250 GENERAL PHARMACY W/ HCPCS (ALT 636 FOR OP/ED)

## 2024-04-17 PROCEDURE — 2500000001 HC RX 250 WO HCPCS SELF ADMINISTERED DRUGS (ALT 637 FOR MEDICARE OP)

## 2024-04-17 PROCEDURE — 85027 COMPLETE CBC AUTOMATED: CPT | Performed by: PHYSICIAN ASSISTANT

## 2024-04-17 PROCEDURE — 82947 ASSAY GLUCOSE BLOOD QUANT: CPT

## 2024-04-17 RX ORDER — CYCLOBENZAPRINE HCL 10 MG
10 TABLET ORAL 3 TIMES DAILY PRN
Qty: 15 TABLET | Refills: 0 | Status: SHIPPED | OUTPATIENT
Start: 2024-04-17 | End: 2024-04-22

## 2024-04-17 RX ORDER — ACETAZOLAMIDE 250 MG/1
500 TABLET ORAL 2 TIMES DAILY
Qty: 120 TABLET | Refills: 0 | Status: SHIPPED | OUTPATIENT
Start: 2024-04-17 | End: 2024-05-17

## 2024-04-17 RX ORDER — POLYETHYLENE GLYCOL 3350 17 G/17G
17 POWDER, FOR SOLUTION ORAL DAILY PRN
Qty: 7 PACKET | Refills: 0 | Status: SHIPPED | OUTPATIENT
Start: 2024-04-17 | End: 2024-04-24

## 2024-04-17 RX ORDER — ACETAZOLAMIDE 250 MG/1
500 TABLET ORAL 2 TIMES DAILY
Qty: 120 TABLET | Refills: 0 | Status: CANCELLED | OUTPATIENT
Start: 2024-04-17 | End: 2024-05-17

## 2024-04-17 RX ORDER — AMOXICILLIN 250 MG
2 CAPSULE ORAL 2 TIMES DAILY
Qty: 28 TABLET | Refills: 0 | Status: SHIPPED | OUTPATIENT
Start: 2024-04-17 | End: 2024-04-24

## 2024-04-17 RX ORDER — OXYCODONE HYDROCHLORIDE 5 MG/1
5 TABLET ORAL EVERY 6 HOURS PRN
Qty: 20 TABLET | Refills: 0 | Status: SHIPPED | OUTPATIENT
Start: 2024-04-17 | End: 2024-04-22

## 2024-04-17 RX ADMIN — SENNOSIDES AND DOCUSATE SODIUM 2 TABLET: 8.6; 5 TABLET ORAL at 08:41

## 2024-04-17 RX ADMIN — HEPARIN SODIUM 5000 UNITS: 5000 INJECTION INTRAVENOUS; SUBCUTANEOUS at 10:30

## 2024-04-17 RX ADMIN — ACETAMINOPHEN 650 MG: 325 TABLET ORAL at 06:15

## 2024-04-17 RX ADMIN — ACETAMINOPHEN 650 MG: 325 TABLET ORAL at 01:04

## 2024-04-17 RX ADMIN — HEPARIN SODIUM 5000 UNITS: 5000 INJECTION INTRAVENOUS; SUBCUTANEOUS at 02:08

## 2024-04-17 RX ADMIN — ACETAMINOPHEN 650 MG: 325 TABLET ORAL at 11:39

## 2024-04-17 RX ADMIN — LOSARTAN POTASSIUM 25 MG: 25 TABLET, FILM COATED ORAL at 08:41

## 2024-04-17 RX ADMIN — AMLODIPINE BESYLATE 10 MG: 10 TABLET ORAL at 08:41

## 2024-04-17 RX ADMIN — PANTOPRAZOLE SODIUM 40 MG: 40 TABLET, DELAYED RELEASE ORAL at 06:15

## 2024-04-17 RX ADMIN — ACETAZOLAMIDE 500 MG: 250 TABLET ORAL at 08:41

## 2024-04-17 ASSESSMENT — PAIN - FUNCTIONAL ASSESSMENT
PAIN_FUNCTIONAL_ASSESSMENT: 0-10

## 2024-04-17 ASSESSMENT — PAIN DESCRIPTION - LOCATION: LOCATION: HEAD

## 2024-04-17 ASSESSMENT — PAIN SCALES - GENERAL
PAINLEVEL_OUTOF10: 0 - NO PAIN
PAINLEVEL_OUTOF10: 4
PAINLEVEL_OUTOF10: 3
PAINLEVEL_OUTOF10: 4
PAINLEVEL_OUTOF10: 0 - NO PAIN

## 2024-04-17 NOTE — DISCHARGE SUMMARY
Discharge Diagnosis  Brain mass    Test Results Pending At Discharge  Pending Labs       No current pending labs.          Hospital Course  Radha Holland is a 48 y.o. 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 MRI brain confirmed cystic lateral vent mass, MRA neg, CT CAP negative. Patient taken to the OR on 4/9 for right craniotomy for colloid cyst fenestration and EVD placement.  Admitted to NSU post op with ongoing headaches that initially improved with medication. Post op CT with postoperative changes and catheter in appropriate position. EVD open to 10. Clamp trial attempted 4/12 with symptomatic elevated ICPs. EVD opened again to 10 with possible plans for shunt placement. Attempted clamp trial again 4/15 with improvement in symptoms after initiation of diamox. EVD removed 4/15. Decision made not to move forward with shunt placement. Ophthalmology consulted 4/15 and did not appreciate papilledema on exam. Repeat head CT completed 4/16 stable. Patient completed 1 week dex taper and was placed on IV ancef while EVD was in place. PT/OT evaluated patient and recommended low intensity level of continued care for which outpatient PT/OT referrals were placed. On the day of discharge, the patient was seen and evaluated by the neurosurgery team and deemed suitable for discharge home.  There were no significant events overnight. Vitals were reviewed and within normal limits. Labs were stable at discharge. On day of discharge the patient was tolerating a diet, pain was controlled on PO pain medication, was ambulating well and voiding spontaneously. The patient was given detailed discharge instructions and were scheduled to follow up as an outpatient.     Pertinent Physical Exam At Time of Discharge  Constitutional: A&Ox3, calm and cooperative, NAD.  Eyes:  PERRL, clear sclera.   ENMT: Moist mucous membranes, no apparent injuries or lesions.  Head/Neck: EVD site C/D/I. Incision C/D/I.   Cardiovascular: Normal rate and regular rhythm. 2+ equal pulses of the distal extremities.  Respiratory/Thorax: Unlabored, regular respirations on RA. Good symmetric chest expansion.   Gastrointestinal: Abdomen soft, non tender.   Genitourinary: Voiding independently.   Extremities: Follows commands x4, 5/5 strength throughout.   Neurological: A&Ox3.   Psychological: Appropriate mood and behavior.     Home Medications     Medication List      START taking these medications     acetaZOLAMIDE 250 mg tablet; Commonly known as: Diamox; Take 2 tablets   (500 mg) by mouth 2 times a day.   cyclobenzaprine 10 mg tablet; Commonly known as: Flexeril; Take 1 tablet   (10 mg) by mouth 3 times a day as needed for muscle spasms for up to 5   days.   oxyCODONE 5 mg immediate release tablet; Commonly known as: Roxicodone;   Take 1 tablet (5 mg) by mouth every 6 hours if needed for severe pain (7 -   10) for up to 5 days.   polyethylene glycol 17 gram packet; Commonly known as: Glycolax,   Miralax; Take 17 g by mouth once daily as needed (constipation) for up to   7 days.   sennosides-docusate sodium 8.6-50 mg tablet; Commonly known as:   Laurence-Colace; Take 2 tablets by mouth 2 times a day for 7 days.     CONTINUE taking these medications     albuterol 90 mcg/actuation inhaler   amLODIPine 10 mg tablet; Commonly known as: Norvasc   butalbital-acetaminophen-caff -40 mg tablet   erythromycin 5 mg/gram (0.5 %) ophthalmic ointment; Commonly known as:   Romycin   hydroCHLOROthiazide 12.5 mg tablet; Commonly known as: Microzide   losartan 25 mg tablet; Commonly known as: Cozaar   polyvinyl alcohol 1.4 % ophthalmic solution; Commonly known as:   Liquifilm Tears     Outpatient Follow-Up  Future Appointments   Date Time Provider Department Center   4/24/2024  1:30 PM Solo Martinez MD [id number]      Sabrina Teague PA-C

## 2024-04-17 NOTE — CARE PLAN
Problem: Discharge Planning  Goal: Discharge to home or other facility with appropriate resources  Outcome: Progressing     Problem: Chronic Conditions and Co-morbidities  Goal: Patient's chronic conditions and co-morbidity symptoms are monitored and maintained or improved  Outcome: Progressing     Problem: Fall/Injury  Goal: Not fall by end of shift  Outcome: Progressing  Goal: Be free from injury by end of the shift  Outcome: Progressing  Goal: Verbalize understanding of personal risk factors for fall in the hospital  Outcome: Progressing  Goal: Verbalize understanding of risk factor reduction measures to prevent injury from fall in the home  Outcome: Progressing  Goal: Use assistive devices by end of the shift  Outcome: Progressing  Goal: Pace activities to prevent fatigue by end of the shift  Outcome: Progressing     Problem: Pain  Goal: Takes deep breaths with improved pain control throughout the shift  Outcome: Progressing  Goal: Turns in bed with improved pain control throughout the shift  Outcome: Progressing  Goal: Walks with improved pain control throughout the shift  Outcome: Progressing  Goal: Performs ADL's with improved pain control throughout shift  Outcome: Progressing  Goal: Participates in PT with improved pain control throughout the shift  Outcome: Progressing  Goal: Free from opioid side effects throughout the shift  Outcome: Progressing  Goal: Free from acute confusion related to pain meds throughout the shift  Outcome: Progressing

## 2024-04-17 NOTE — PROGRESS NOTES
Physical Therapy                 Therapy Communication Note    Patient Name: Radha Holland  MRN: [id number]  Today's Date: 4/17/2024     Discipline: Physical Therapy    Missed Visit Reason: Missed Visit Reason:  (Pt declines PT services; Reports she has been up to bathroom without issue and is looking forward to resting before going home.)    Missed Time: Attempt

## 2024-04-17 NOTE — PROGRESS NOTES
"Radha Holland is a 48 y.o. female on day 12 of admission presenting with Brain mass.    Subjective     No acute events overnight.    Objective     Physical Exam    Aox3  Fcx4 5/5  EVD site cdi  Incision c/d/i    Last Recorded Vitals  Blood pressure 101/65, pulse 64, temperature 36.3 °C (97.3 °F), temperature source Temporal, resp. rate 14, height 1.626 m (5' 4.02\"), weight 97.4 kg (214 lb 11.7 oz), SpO2 100%.  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 CTH POC, cath in posn  4/12 EVD clamped, opened x1 for HA and increased ICPs  4/15 EVD dc'd, diamox initiated  4/16 CTH stable    SANDY  Diamox 500BID  Ophtho recs - no papilledema  PTOT-HC  SCDs, SQH    Likely discharge today.      Jamie Blank MD      "

## 2024-04-17 NOTE — CARE PLAN
Problem: Discharge Planning  Goal: Discharge to home or other facility with appropriate resources  Outcome: Met     Problem: Chronic Conditions and Co-morbidities  Goal: Patient's chronic conditions and co-morbidity symptoms are monitored and maintained or improved  Outcome: Met     Problem: Fall/Injury  Goal: Not fall by end of shift  Outcome: Met  Goal: Be free from injury by end of the shift  Outcome: Met  Goal: Verbalize understanding of personal risk factors for fall in the hospital  Outcome: Met  Goal: Verbalize understanding of risk factor reduction measures to prevent injury from fall in the home  Outcome: Met  Goal: Use assistive devices by end of the shift  Outcome: Met  Goal: Pace activities to prevent fatigue by end of the shift  Outcome: Met     Problem: Pain  Goal: Takes deep breaths with improved pain control throughout the shift  Outcome: Met  Goal: Turns in bed with improved pain control throughout the shift  Outcome: Met  Goal: Walks with improved pain control throughout the shift  Outcome: Met  Goal: Performs ADL's with improved pain control throughout shift  Outcome: Met  Goal: Participates in PT with improved pain control throughout the shift  Outcome: Met  Goal: Free from opioid side effects throughout the shift  Outcome: Met  Goal: Free from acute confusion related to pain meds throughout the shift  Outcome: Met

## 2024-04-17 NOTE — PROGRESS NOTES
Occupational Therapy                 Therapy Communication Note    Patient Name: Radha Holland  MRN: [id number]  Today's Date: 4/17/2024     Discipline: Occupational Therapy    Missed Visit Reason: Missed Visit Reason: Other (Comment) (Pt pleasantly refusing OT needs at this time. Will reattempt is changes)    Missed Time: Attempt    Comment:

## 2024-04-24 ENCOUNTER — TUMOR BOARD CONFERENCE (OUTPATIENT)
Dept: HEMATOLOGY/ONCOLOGY | Facility: HOSPITAL | Age: 49
End: 2024-04-24
Payer: COMMERCIAL

## 2024-04-24 ENCOUNTER — APPOINTMENT (OUTPATIENT)
Dept: NEUROSURGERY | Facility: HOSPITAL | Age: 49
End: 2024-04-24
Payer: COMMERCIAL

## 2024-04-24 NOTE — TUMOR BOARD NOTE
CNS Tumor Board Recommendations       Patient was presented by Dr.Michael Alvarez at our CNS Tumor Board on 04/17/24 which included representatives from Radiation oncology, Surgical oncology, Neuro-oncology, Pathology, Radiology, Research, Neurosurgery, Social Work (Neurosurgery).     Current patient presents with history of the following treatment history: PMH HTN, VERN, asthma, and prior ICH, p/w ongoing headache x 1 year worsening over the last 4 days. CTH with 3cm intraventricular mass orginating from septum. MRI brain with cystic lateral ventricular mass with extention upward superiorly. CT C/A/P negative. S/P Craniotomy for colloid cyst fenestration on 04/09/24. 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     The CNS Tumor Board tumor board considered available treatment options and made the following recommendations:Continue with observation surveillance.     Clinical Trial Status: N/A     National site-specific guidelines were discussed with respect to the case.

## 2024-05-06 NOTE — PROGRESS NOTES
[facility]   Neurosurgery    Diagnosis  Diagnoses and all orders for this visit:  Colloid cyst of third ventricle (Multi)      Patient Discussion/Summary  Ms. 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  This was initially found incidentally as she underwent imaging of her brain for headaches.  Her headaches did improve following her initial presentation; however, she began to have recurrent and worsening headaches, and again presented to hospital approximately a year after her initial diagnosis.    Repeat imaging demonstrated this mass to have increased in size.  Its imaging characteristics would most consistent with a colloid cyst, although interestingly it seemed to be entirely within the septum.  Due to her progressive symptoms, we performed an interhemispheric, transcallosal approach for cyst fenestration on April 9.  The contents were quite proteinaceous with some evidence of previous hemorrhage.  We sent some of the tumor capsule, which was consistent with a colloid cyst.    We left an external ventricular drain intraoperatively.  As we attempted to wean this, she did have some headaches.  However, this was without hydrocephalus, and as such we did not think she needed CSF diversion.  We thereafter removed her drain and she did quite well.    In follow-up, she continues to do quite well.  Her headaches are completely eliminated.  She does not describe any new focal neurological deficits.  Her hair is growing back appropriately.  She does not have any complaints regarding the surgical site.    We will obtain a stability scan in approximately 6 months.  Otherwise, she may contact the office with any questions or concerns.      History of Present Illness  Chief Complaint: No chief complaint on file.        HPI: Radha Holland is a 49 y.o. female with a PMH significant for HTN, VERN (on CPAP), arthritis, and prior ICH who presented for evaluation for ongoing headache x 1 year that had worsened over  the last 4 days with associated N/V. CTH with 3cm intraventricular mass from septum. MRI Brain demonstrating cystic lateral ventricular mass. CT C/A/P negative. She is now s/p R craniotomy for colloid cyst fenestration and EVD placement on 04/09. CTH with postoperative changes. Attempted clamp trial on 04/15 but with worsening headaches which improved with Diamox. EVD was removed on 04/15 and shunt placement was not required. Ophthalmology consulted while inpatient and exam negative for papilledema. Repeat CTH remained stable. Patient was discharged to home with outpatient PT/OT. Final surgical pathology was consistent for colloid cyst with hemorrhage and fibrosis. Patient presents to clinic for POV.       ROS: As noted in HPI.      Previous History  No past medical history on file.  No past surgical history on file.  Social History     Tobacco Use    Smoking status: Every Day     Current packs/day: 0.50     Average packs/day: 0.5 packs/day for 20.3 years (10.2 ttl pk-yrs)     Types: Cigarettes     Start date: 2004    Smokeless tobacco: Never   Vaping Use    Vaping status: Every Day    Start date: 1/1/2004    Substances: Nicotine   Substance Use Topics    Alcohol use: Not Currently     Alcohol/week: 2.0 standard drinks of alcohol     Types: 2 Cans of beer per week    Drug use: Yes     Frequency: 1.0 times per week     Types: Marijuana     No family history on file.  No Known Allergies  Current Outpatient Medications   Medication Instructions    acetaZOLAMIDE (DIAMOX) 500 mg, oral, 2 times daily    albuterol 90 mcg/actuation inhaler 2 puffs, inhalation, Every 6 hours PRN    amLODIPine (NORVASC) 10 mg, oral, Daily    butalbital-acetaminophen-caff -40 mg tablet 1 tablet, oral, Every 4 hours PRN    cyclobenzaprine (FLEXERIL) 10 mg, oral, 3 times daily PRN    erythromycin (Romycin) 5 mg/gram (0.5 %) ophthalmic ointment 1 Application, Both Eyes, Nightly    hydroCHLOROthiazide (MICROZIDE) 12.5 mg, oral, Daily     losartan (COZAAR) 25 mg, oral, Daily    polyvinyl alcohol (Liquifilm Tears) 1.4 % ophthalmic solution 1 drop, Both Eyes, 4 times daily         Vitals  LMP  (LMP Unknown)       Physical Exam  The incision was inspected, and this was well-healed.

## 2024-05-08 ENCOUNTER — OFFICE VISIT (OUTPATIENT)
Dept: NEUROSURGERY | Facility: HOSPITAL | Age: 49
End: 2024-05-08
Payer: COMMERCIAL

## 2024-05-08 VITALS
DIASTOLIC BLOOD PRESSURE: 79 MMHG | WEIGHT: 210.54 LBS | HEART RATE: 125 BPM | SYSTOLIC BLOOD PRESSURE: 132 MMHG | RESPIRATION RATE: 20 BRPM | BODY MASS INDEX: 36.12 KG/M2 | OXYGEN SATURATION: 100 % | TEMPERATURE: 96.3 F

## 2024-05-08 DIAGNOSIS — Q04.6 COLLOID CYST OF THIRD VENTRICLE (MULTI): Primary | ICD-10-CM

## 2024-05-08 PROCEDURE — 3078F DIAST BP <80 MM HG: CPT | Performed by: NEUROLOGICAL SURGERY

## 2024-05-08 PROCEDURE — 99024 POSTOP FOLLOW-UP VISIT: CPT | Performed by: NEUROLOGICAL SURGERY

## 2024-05-08 PROCEDURE — 4004F PT TOBACCO SCREEN RCVD TLK: CPT | Performed by: NEUROLOGICAL SURGERY

## 2024-05-08 PROCEDURE — 3075F SYST BP GE 130 - 139MM HG: CPT | Performed by: NEUROLOGICAL SURGERY

## 2024-05-08 ASSESSMENT — PAIN SCALES - GENERAL: PAINLEVEL: 0-NO PAIN

## 2024-08-25 ENCOUNTER — HOSPITAL ENCOUNTER (EMERGENCY)
Age: 49
Discharge: HOME | End: 2024-08-25
Attending: EMERGENCY MEDICINE
Payer: COMMERCIAL

## 2024-08-25 ENCOUNTER — APPOINTMENT (OUTPATIENT)
Dept: GENERAL RADIOLOGY | Age: 49
End: 2024-08-25
Payer: COMMERCIAL

## 2024-08-25 ENCOUNTER — APPOINTMENT (OUTPATIENT)
Dept: CT IMAGING | Age: 49
End: 2024-08-25
Payer: COMMERCIAL

## 2024-08-25 VITALS
WEIGHT: 190 LBS | BODY MASS INDEX: 32.44 KG/M2 | DIASTOLIC BLOOD PRESSURE: 96 MMHG | TEMPERATURE: 98.6 F | SYSTOLIC BLOOD PRESSURE: 165 MMHG | HEIGHT: 64 IN | HEART RATE: 75 BPM | OXYGEN SATURATION: 100 % | RESPIRATION RATE: 16 BRPM

## 2024-08-25 DIAGNOSIS — R51.9 NONINTRACTABLE HEADACHE, UNSPECIFIED CHRONICITY PATTERN, UNSPECIFIED HEADACHE TYPE: ICD-10-CM

## 2024-08-25 DIAGNOSIS — R07.9 CHEST PAIN, UNSPECIFIED TYPE: ICD-10-CM

## 2024-08-25 DIAGNOSIS — R11.2 NAUSEA AND VOMITING, UNSPECIFIED VOMITING TYPE: Primary | ICD-10-CM

## 2024-08-25 LAB
ALBUMIN SERPL-MCNC: 4.3 G/DL (ref 3.5–5.2)
ALP SERPL-CCNC: 131 U/L (ref 35–104)
ALT SERPL-CCNC: 14 U/L (ref 0–32)
ANION GAP SERPL CALCULATED.3IONS-SCNC: 16 MMOL/L (ref 7–16)
AST SERPL-CCNC: 16 U/L (ref 0–31)
BASOPHILS # BLD: 0.02 K/UL (ref 0–0.2)
BASOPHILS NFR BLD: 0 % (ref 0–2)
BILIRUB SERPL-MCNC: 0.4 MG/DL (ref 0–1.2)
BNP SERPL-MCNC: <36 PG/ML (ref 0–450)
BUN SERPL-MCNC: 16 MG/DL (ref 6–20)
CALCIUM SERPL-MCNC: 9.7 MG/DL (ref 8.6–10.2)
CHLORIDE SERPL-SCNC: 97 MMOL/L (ref 98–107)
CO2 SERPL-SCNC: 23 MMOL/L (ref 22–29)
CREAT SERPL-MCNC: 0.9 MG/DL (ref 0.5–1)
EOSINOPHIL # BLD: 0.26 K/UL (ref 0.05–0.5)
EOSINOPHILS RELATIVE PERCENT: 4 % (ref 0–6)
ERYTHROCYTE [DISTWIDTH] IN BLOOD BY AUTOMATED COUNT: 13.2 % (ref 11.5–15)
GFR, ESTIMATED: 74 ML/MIN/1.73M2
GLUCOSE SERPL-MCNC: 103 MG/DL (ref 74–99)
HCT VFR BLD AUTO: 41 % (ref 34–48)
HGB BLD-MCNC: 14 G/DL (ref 11.5–15.5)
IMM GRANULOCYTES # BLD AUTO: <0.03 K/UL (ref 0–0.58)
IMM GRANULOCYTES NFR BLD: 0 % (ref 0–5)
LIPASE SERPL-CCNC: 31 U/L (ref 13–60)
LYMPHOCYTES NFR BLD: 3.38 K/UL (ref 1.5–4)
LYMPHOCYTES RELATIVE PERCENT: 51 % (ref 20–42)
MAGNESIUM SERPL-MCNC: 1.8 MG/DL (ref 1.6–2.6)
MCH RBC QN AUTO: 29.8 PG (ref 26–35)
MCHC RBC AUTO-ENTMCNC: 34.1 G/DL (ref 32–34.5)
MCV RBC AUTO: 87.2 FL (ref 80–99.9)
MONOCYTES NFR BLD: 0.57 K/UL (ref 0.1–0.95)
MONOCYTES NFR BLD: 9 % (ref 2–12)
NEUTROPHILS NFR BLD: 36 % (ref 43–80)
NEUTS SEG NFR BLD: 2.4 K/UL (ref 1.8–7.3)
PLATELET # BLD AUTO: 307 K/UL (ref 130–450)
PMV BLD AUTO: 9.7 FL (ref 7–12)
POTASSIUM SERPL-SCNC: 3.4 MMOL/L (ref 3.5–5)
PROT SERPL-MCNC: 8 G/DL (ref 6.4–8.3)
RBC # BLD AUTO: 4.7 M/UL (ref 3.5–5.5)
SODIUM SERPL-SCNC: 136 MMOL/L (ref 132–146)
TROPONIN I SERPL HS-MCNC: <6 NG/L (ref 0–9)
WBC OTHER # BLD: 6.6 K/UL (ref 4.5–11.5)

## 2024-08-25 PROCEDURE — 96374 THER/PROPH/DIAG INJ IV PUSH: CPT

## 2024-08-25 PROCEDURE — 85025 COMPLETE CBC W/AUTO DIFF WBC: CPT

## 2024-08-25 PROCEDURE — 93005 ELECTROCARDIOGRAM TRACING: CPT | Performed by: EMERGENCY MEDICINE

## 2024-08-25 PROCEDURE — 99285 EMERGENCY DEPT VISIT HI MDM: CPT

## 2024-08-25 PROCEDURE — 83690 ASSAY OF LIPASE: CPT

## 2024-08-25 PROCEDURE — 6370000000 HC RX 637 (ALT 250 FOR IP): Performed by: EMERGENCY MEDICINE

## 2024-08-25 PROCEDURE — 96375 TX/PRO/DX INJ NEW DRUG ADDON: CPT

## 2024-08-25 PROCEDURE — 71046 X-RAY EXAM CHEST 2 VIEWS: CPT

## 2024-08-25 PROCEDURE — 84484 ASSAY OF TROPONIN QUANT: CPT

## 2024-08-25 PROCEDURE — 83880 ASSAY OF NATRIURETIC PEPTIDE: CPT

## 2024-08-25 PROCEDURE — 80053 COMPREHEN METABOLIC PANEL: CPT

## 2024-08-25 PROCEDURE — 6360000002 HC RX W HCPCS: Performed by: EMERGENCY MEDICINE

## 2024-08-25 PROCEDURE — 70450 CT HEAD/BRAIN W/O DYE: CPT

## 2024-08-25 PROCEDURE — 2580000003 HC RX 258: Performed by: EMERGENCY MEDICINE

## 2024-08-25 PROCEDURE — 83735 ASSAY OF MAGNESIUM: CPT

## 2024-08-25 PROCEDURE — 96372 THER/PROPH/DIAG INJ SC/IM: CPT

## 2024-08-25 RX ORDER — 0.9 % SODIUM CHLORIDE 0.9 %
1000 INTRAVENOUS SOLUTION INTRAVENOUS ONCE
Status: COMPLETED | OUTPATIENT
Start: 2024-08-25 | End: 2024-08-25

## 2024-08-25 RX ORDER — SUMATRIPTAN 6 MG/.5ML
6 INJECTION, SOLUTION SUBCUTANEOUS ONCE
Status: COMPLETED | OUTPATIENT
Start: 2024-08-25 | End: 2024-08-25

## 2024-08-25 RX ORDER — DIPHENHYDRAMINE HYDROCHLORIDE 50 MG/ML
25 INJECTION INTRAMUSCULAR; INTRAVENOUS ONCE
Status: COMPLETED | OUTPATIENT
Start: 2024-08-25 | End: 2024-08-25

## 2024-08-25 RX ORDER — METOCLOPRAMIDE HYDROCHLORIDE 5 MG/ML
10 INJECTION INTRAMUSCULAR; INTRAVENOUS ONCE
Status: COMPLETED | OUTPATIENT
Start: 2024-08-25 | End: 2024-08-25

## 2024-08-25 RX ADMIN — SODIUM CHLORIDE 1000 ML: 9 INJECTION, SOLUTION INTRAVENOUS at 21:31

## 2024-08-25 RX ADMIN — DIPHENHYDRAMINE HYDROCHLORIDE 25 MG: 50 INJECTION INTRAMUSCULAR; INTRAVENOUS at 21:26

## 2024-08-25 RX ADMIN — METOCLOPRAMIDE 10 MG: 5 INJECTION, SOLUTION INTRAMUSCULAR; INTRAVENOUS at 21:26

## 2024-08-25 RX ADMIN — SUMATRIPTAN 6 MG: 6 INJECTION, SOLUTION SUBCUTANEOUS at 21:27

## 2024-08-25 ASSESSMENT — LIFESTYLE VARIABLES
HOW MANY STANDARD DRINKS CONTAINING ALCOHOL DO YOU HAVE ON A TYPICAL DAY: 1 OR 2
HOW OFTEN DO YOU HAVE A DRINK CONTAINING ALCOHOL: 2-4 TIMES A MONTH

## 2024-08-25 ASSESSMENT — PAIN SCALES - GENERAL: PAINLEVEL_OUTOF10: 3

## 2024-08-26 LAB
EKG ATRIAL RATE: 77 BPM
EKG P AXIS: 34 DEGREES
EKG P-R INTERVAL: 122 MS
EKG Q-T INTERVAL: 412 MS
EKG QRS DURATION: 78 MS
EKG QTC CALCULATION (BAZETT): 466 MS
EKG R AXIS: 23 DEGREES
EKG T AXIS: 28 DEGREES
EKG VENTRICULAR RATE: 77 BPM

## 2024-08-26 PROCEDURE — 93010 ELECTROCARDIOGRAM REPORT: CPT | Performed by: INTERNAL MEDICINE

## 2024-08-26 NOTE — ED PROVIDER NOTES
Chief complaint:  Headache      HPI history provided by the patient  Patient resents her complaining of a headache today, she took a headache pill earlier.  The headache is mild in general.  Does have a history of mass removal and aneurysm removal.  No eye pain or vision changes.  No fevers.  Also states has been nausea with vomiting all day with no diarrhea.  States that earlier today she had a nonspecific brief episode of chest pain although apparently not having chest pain now.  Denies abdominal pain.  Denies fevers or flank pain or dysuria.  No extremity weakness or numbness or paresthesia.  No syncope.    Review of Systems   Constitutional:  Negative for chills, diaphoresis, fatigue and fever.   HENT:  Negative for congestion, rhinorrhea, sinus pressure and sore throat.    Eyes:  Negative for pain, redness and visual disturbance.   Respiratory:  Negative for cough, chest tightness, shortness of breath and wheezing.    Cardiovascular:  Positive for chest pain. Negative for palpitations and leg swelling.   Gastrointestinal:  Positive for nausea and vomiting. Negative for abdominal pain and diarrhea.   Genitourinary:  Negative for dysuria, flank pain, frequency and urgency.   Musculoskeletal:  Negative for arthralgias, back pain, gait problem, joint swelling, myalgias, neck pain and neck stiffness.   Skin:  Negative for rash and wound.   Neurological:  Positive for headaches. Negative for dizziness, tremors, seizures, syncope, facial asymmetry, speech difficulty, weakness, light-headedness and numbness.   All other systems reviewed and are negative.       Physical Exam  Vitals and nursing note reviewed.   Constitutional:       General: She is awake. She is not in acute distress.     Appearance: She is well-developed. She is not ill-appearing, toxic-appearing or diaphoretic.   HENT:      Head: Normocephalic and atraumatic.      Comments: No sign of acute head or face injury  Eyes:      General: No scleral  -------------------------------------------------  Labs:  Results for orders placed or performed during the hospital encounter of 08/25/24   CBC with Auto Differential   Result Value Ref Range    WBC 6.6 4.5 - 11.5 k/uL    RBC 4.70 3.50 - 5.50 m/uL    Hemoglobin 14.0 11.5 - 15.5 g/dL    Hematocrit 41.0 34.0 - 48.0 %    MCV 87.2 80.0 - 99.9 fL    MCH 29.8 26.0 - 35.0 pg    MCHC 34.1 32.0 - 34.5 g/dL    RDW 13.2 11.5 - 15.0 %    Platelets 307 130 - 450 k/uL    MPV 9.7 7.0 - 12.0 fL    Neutrophils % 36 (L) 43.0 - 80.0 %    Lymphocytes % 51 (H) 20.0 - 42.0 %    Monocytes % 9 2.0 - 12.0 %    Eosinophils % 4 0 - 6 %    Basophils % 0 0.0 - 2.0 %    Immature Granulocytes % 0 0.0 - 5.0 %    Neutrophils Absolute 2.40 1.80 - 7.30 k/uL    Lymphocytes Absolute 3.38 1.50 - 4.00 k/uL    Monocytes Absolute 0.57 0.10 - 0.95 k/uL    Eosinophils Absolute 0.26 0.05 - 0.50 k/uL    Basophils Absolute 0.02 0.00 - 0.20 k/uL    Immature Granulocytes Absolute <0.03 0.00 - 0.58 k/uL   Comprehensive Metabolic Panel   Result Value Ref Range    Sodium 136 132 - 146 mmol/L    Potassium 3.4 (L) 3.5 - 5.0 mmol/L    Chloride 97 (L) 98 - 107 mmol/L    CO2 23 22 - 29 mmol/L    Anion Gap 16 7 - 16 mmol/L    Glucose 103 (H) 74 - 99 mg/dL    BUN 16 6 - 20 mg/dL    Creatinine 0.9 0.50 - 1.00 mg/dL    Est, Glom Filt Rate 74 >60 mL/min/1.73m2    Calcium 9.7 8.6 - 10.2 mg/dL    Total Protein 8.0 6.4 - 8.3 g/dL    Albumin 4.3 3.5 - 5.2 g/dL    Total Bilirubin 0.4 0.0 - 1.2 mg/dL    Alkaline Phosphatase 131 (H) 35 - 104 U/L    ALT 14 0 - 32 U/L    AST 16 0 - 31 U/L   Brain Natriuretic Peptide   Result Value Ref Range    Pro-BNP <36 0 - 450 pg/mL   Magnesium   Result Value Ref Range    Magnesium 1.8 1.6 - 2.6 mg/dL   Lipase   Result Value Ref Range    Lipase 31 13 - 60 U/L   Troponin   Result Value Ref Range    Troponin, High Sensitivity <6 0 - 9 ng/L       Radiology:  XR CHEST (2 VW)   Final Result   No acute cardiopulmonary process.         CT HEAD WO

## 2024-08-26 NOTE — ED NOTES
Pt given d/c instructions and was able to verbalize understanding. Pt's PIV removed with catheter intact. Pt ambulatory out of department.

## 2024-08-26 NOTE — DISCHARGE INSTRUCTIONS
Return symptoms change or worsen        Thank you for the opportunity to care for you during this emergency department visit. I hope you are doing better. We strive to always improve our care. You may receive a survey and I hope you had a positive experience here. We greatly appreciate your 5 scores.

## 2024-12-31 ENCOUNTER — HOSPITAL ENCOUNTER (EMERGENCY)
Age: 49
Discharge: HOME | End: 2024-12-31
Attending: EMERGENCY MEDICINE
Payer: COMMERCIAL

## 2024-12-31 VITALS
WEIGHT: 160 LBS | DIASTOLIC BLOOD PRESSURE: 93 MMHG | TEMPERATURE: 97.4 F | BODY MASS INDEX: 27.46 KG/M2 | OXYGEN SATURATION: 98 % | SYSTOLIC BLOOD PRESSURE: 130 MMHG | RESPIRATION RATE: 16 BRPM | HEART RATE: 92 BPM

## 2024-12-31 DIAGNOSIS — I10 HYPERTENSION, UNSPECIFIED TYPE: Primary | ICD-10-CM

## 2024-12-31 LAB
ALBUMIN SERPL-MCNC: 4.1 G/DL (ref 3.5–5.2)
ALP SERPL-CCNC: 140 U/L (ref 35–104)
ALT SERPL-CCNC: 18 U/L (ref 0–32)
ANION GAP SERPL CALCULATED.3IONS-SCNC: 8 MMOL/L (ref 7–16)
AST SERPL-CCNC: 18 U/L (ref 0–31)
BASOPHILS # BLD: 0.02 K/UL (ref 0–0.2)
BASOPHILS NFR BLD: 0 % (ref 0–2)
BILIRUB SERPL-MCNC: 0.4 MG/DL (ref 0–1.2)
BUN SERPL-MCNC: 16 MG/DL (ref 6–20)
CALCIUM SERPL-MCNC: 9.7 MG/DL (ref 8.6–10.2)
CHLORIDE SERPL-SCNC: 101 MMOL/L (ref 98–107)
CO2 SERPL-SCNC: 28 MMOL/L (ref 22–29)
CREAT SERPL-MCNC: 0.8 MG/DL (ref 0.5–1)
EOSINOPHIL # BLD: 0.21 K/UL (ref 0.05–0.5)
EOSINOPHILS RELATIVE PERCENT: 3 % (ref 0–6)
ERYTHROCYTE [DISTWIDTH] IN BLOOD BY AUTOMATED COUNT: 12.7 % (ref 11.5–15)
GFR, ESTIMATED: 87 ML/MIN/1.73M2
GLUCOSE SERPL-MCNC: 98 MG/DL (ref 74–99)
HCT VFR BLD AUTO: 40.3 % (ref 34–48)
HGB BLD-MCNC: 13.7 G/DL (ref 11.5–15.5)
IMM GRANULOCYTES # BLD AUTO: <0.03 K/UL (ref 0–0.58)
IMM GRANULOCYTES NFR BLD: 0 % (ref 0–5)
LYMPHOCYTES NFR BLD: 2.35 K/UL (ref 1.5–4)
LYMPHOCYTES RELATIVE PERCENT: 33 % (ref 20–42)
MCH RBC QN AUTO: 29.8 PG (ref 26–35)
MCHC RBC AUTO-ENTMCNC: 34 G/DL (ref 32–34.5)
MCV RBC AUTO: 87.8 FL (ref 80–99.9)
MONOCYTES NFR BLD: 0.6 K/UL (ref 0.1–0.95)
MONOCYTES NFR BLD: 8 % (ref 2–12)
NEUTROPHILS NFR BLD: 55 % (ref 43–80)
NEUTS SEG NFR BLD: 3.98 K/UL (ref 1.8–7.3)
PLATELET # BLD AUTO: 292 K/UL (ref 130–450)
PMV BLD AUTO: 9.7 FL (ref 7–12)
POTASSIUM SERPL-SCNC: 3.6 MMOL/L (ref 3.5–5)
PROT SERPL-MCNC: 7.6 G/DL (ref 6.4–8.3)
RBC # BLD AUTO: 4.59 M/UL (ref 3.5–5.5)
SODIUM SERPL-SCNC: 137 MMOL/L (ref 132–146)
TROPONIN I SERPL HS-MCNC: <6 NG/L (ref 0–9)
WBC OTHER # BLD: 7.2 K/UL (ref 4.5–11.5)

## 2024-12-31 PROCEDURE — 85025 COMPLETE CBC W/AUTO DIFF WBC: CPT

## 2024-12-31 PROCEDURE — 80053 COMPREHEN METABOLIC PANEL: CPT

## 2024-12-31 PROCEDURE — 99284 EMERGENCY DEPT VISIT MOD MDM: CPT

## 2024-12-31 PROCEDURE — 84484 ASSAY OF TROPONIN QUANT: CPT

## 2024-12-31 ASSESSMENT — ENCOUNTER SYMPTOMS
CHEST TIGHTNESS: 0
SHORTNESS OF BREATH: 0

## 2024-12-31 ASSESSMENT — LIFESTYLE VARIABLES
HOW MANY STANDARD DRINKS CONTAINING ALCOHOL DO YOU HAVE ON A TYPICAL DAY: PATIENT DOES NOT DRINK
HOW OFTEN DO YOU HAVE A DRINK CONTAINING ALCOHOL: NEVER

## 2024-12-31 NOTE — DISCHARGE INSTR - COC
Continuity of Care Form    Patient Name: Marina Hameed   :  1975  MRN:  [id number]    Admit date:  2024  Discharge date:  ***    Code Status Order: Prior   Advance Directives:   Advance Care Flowsheet Documentation             Admitting Physician:  No admitting provider for patient encounter.  PCP: No primary care provider on file.    Discharging Nurse: ***  Discharging Hospital Unit/Room#: YOGESH/YOGESH  Discharging Unit Phone Number: ***    Emergency Contact:   Extended Emergency Contact Information  Primary Emergency Contact: Hazel Bacon  Mobile Phone: [phone]  Relation: Child  Secondary Emergency Contact: Shea,Wanda   [facility]  Home Phone: [phone]  Mobile Phone: [phone]  Relation: Parent    Past Surgical History:  Past Surgical History:   Procedure Laterality Date    BUNIONECTOMY Left 12    jovan    DILATION AND CURETTAGE OF UTERUS N/A 2/15/2021    HYSTEROSCOPY DILATATION AND CURETTAGE/ENDOMETRIAL ABLATION performed by Jered Garland MD at [facility] OR    FEMORAL LINE  7/10/2023    FOOT SURGERY Right 2014    jovan bunionectomy right foot    IR NONTUNNELED VASCULAR CATHETER > 5 YEARS  7/10/2023    IR NONTUNNELED VASCULAR CATHETER 7/10/2023 Michael Saez MD [facility]    OTHER SURGICAL HISTORY Right 2018    internal ank.e stabilization    TUBAL LIGATION         Immunization History:   Immunization History   Administered Date(s) Administered    COVID-19, PFIZER GRAY top, DO NOT Dilute, (age 12 y+), IM, 30 mcg/0.3 mL 2021    COVID-19, PFIZER PURPLE top, DILUTE for use, (age 12 y+), 30mcg/0.3mL 2021    Influenza, FLUARIX, FLULAVAL, FLUZONE (age 6 mo+) and AFLURIA, (age 3 y+), Quadv PF, 0.5mL 2018, 09/10/2018    Pneumococcal, PPSV23, PNEUMOVAX 23, (age 2y+), SC/IM, 0.5mL 09/10/2018    TDaP, ADACEL (age 10y-64y), BOOSTRIX (age 10y+), IM, 0.5mL 2018       Active Problems:  Patient Active Problem List   Diagnosis

## 2024-12-31 NOTE — ED PROVIDER NOTES
tomorrow.      --------------------------------- ADDITIONAL PROVIDER NOTES ---------------------------------  At this time the patient is without objective evidence of an acute process requiring hospitalization or inpatient management.  They have remained hemodynamically stable throughout their entire ED visit and are stable for discharge with outpatient follow-up.     The plan has been discussed in detail and they are aware of the specific conditions for emergent return, as well as the importance of follow-up.      Discharge Medication List as of 12/31/2024  5:51 PM          Diagnosis:  1. Hypertension, unspecified type        Disposition:  Patient's disposition: Discharge to home  Patient's condition is stable.                Roni Holland DO  12/31/24 2205

## 2025-01-02 LAB
EKG ATRIAL RATE: 88 BPM
EKG P AXIS: 67 DEGREES
EKG P-R INTERVAL: 132 MS
EKG Q-T INTERVAL: 374 MS
EKG QRS DURATION: 78 MS
EKG QTC CALCULATION (BAZETT): 452 MS
EKG R AXIS: 67 DEGREES
EKG T AXIS: 35 DEGREES
EKG VENTRICULAR RATE: 88 BPM

## 2025-03-20 ENCOUNTER — HOSPITAL ENCOUNTER (EMERGENCY)
Age: 50
Discharge: LEFT BEFORE DISCHARGE | End: 2025-03-20
Attending: EMERGENCY MEDICINE
Payer: COMMERCIAL

## 2025-03-20 ENCOUNTER — APPOINTMENT (OUTPATIENT)
Dept: CT IMAGING | Age: 50
End: 2025-03-20
Payer: COMMERCIAL

## 2025-03-20 ENCOUNTER — APPOINTMENT (OUTPATIENT)
Dept: GENERAL RADIOLOGY | Age: 50
End: 2025-03-20
Payer: COMMERCIAL

## 2025-03-20 VITALS
TEMPERATURE: 98.3 F | OXYGEN SATURATION: 99 % | RESPIRATION RATE: 20 BRPM | SYSTOLIC BLOOD PRESSURE: 145 MMHG | DIASTOLIC BLOOD PRESSURE: 102 MMHG | HEART RATE: 99 BPM

## 2025-03-20 DIAGNOSIS — R55 SYNCOPE, UNSPECIFIED SYNCOPE TYPE: ICD-10-CM

## 2025-03-20 DIAGNOSIS — Z53.29 LEFT AGAINST MEDICAL ADVICE: Primary | ICD-10-CM

## 2025-03-20 DIAGNOSIS — R25.1 TREMORS OF NERVOUS SYSTEM: ICD-10-CM

## 2025-03-20 LAB
ALBUMIN SERPL-MCNC: 4.1 G/DL (ref 3.5–5.2)
ALP SERPL-CCNC: 126 U/L (ref 35–104)
ALT SERPL-CCNC: 16 U/L (ref 0–32)
ANION GAP SERPL CALCULATED.3IONS-SCNC: 15 MMOL/L (ref 7–16)
AST SERPL-CCNC: 29 U/L (ref 0–31)
BACTERIA URNS QL MICRO: ABNORMAL
BASOPHILS # BLD: 0.03 K/UL (ref 0–0.2)
BASOPHILS NFR BLD: 1 % (ref 0–2)
BILIRUB SERPL-MCNC: 0.3 MG/DL (ref 0–1.2)
BILIRUB UR QL STRIP: NEGATIVE
BUN SERPL-MCNC: 11 MG/DL (ref 6–20)
CALCIUM SERPL-MCNC: 8.7 MG/DL (ref 8.6–10.2)
CHLORIDE SERPL-SCNC: 102 MMOL/L (ref 98–107)
CHP ED QC CHECK: YES
CLARITY UR: CLEAR
CO2 SERPL-SCNC: 18 MMOL/L (ref 22–29)
COLOR UR: YELLOW
CREAT SERPL-MCNC: 0.6 MG/DL (ref 0.5–1)
EKG ATRIAL RATE: 87 BPM
EKG P AXIS: 46 DEGREES
EKG P-R INTERVAL: 124 MS
EKG Q-T INTERVAL: 388 MS
EKG QRS DURATION: 78 MS
EKG QTC CALCULATION (BAZETT): 466 MS
EKG R AXIS: 30 DEGREES
EKG T AXIS: 40 DEGREES
EKG VENTRICULAR RATE: 87 BPM
EOSINOPHIL # BLD: 0.23 K/UL (ref 0.05–0.5)
EOSINOPHILS RELATIVE PERCENT: 4 % (ref 0–6)
EPI CELLS #/AREA URNS HPF: ABNORMAL /HPF
ERYTHROCYTE [DISTWIDTH] IN BLOOD BY AUTOMATED COUNT: 13.9 % (ref 11.5–15)
GFR, ESTIMATED: >90 ML/MIN/1.73M2
GLUCOSE BLD-MCNC: 92 MG/DL
GLUCOSE BLD-MCNC: 92 MG/DL (ref 74–99)
GLUCOSE SERPL-MCNC: 72 MG/DL (ref 74–99)
GLUCOSE UR STRIP-MCNC: NEGATIVE MG/DL
HCT VFR BLD AUTO: 39 % (ref 34–48)
HGB BLD-MCNC: 13 G/DL (ref 11.5–15.5)
HGB UR QL STRIP.AUTO: ABNORMAL
IMM GRANULOCYTES # BLD AUTO: 0.04 K/UL (ref 0–0.58)
IMM GRANULOCYTES NFR BLD: 1 % (ref 0–5)
KETONES UR STRIP-MCNC: 40 MG/DL
LEUKOCYTE ESTERASE UR QL STRIP: NEGATIVE
LYMPHOCYTES NFR BLD: 1.78 K/UL (ref 1.5–4)
LYMPHOCYTES RELATIVE PERCENT: 27 % (ref 20–42)
MAGNESIUM SERPL-MCNC: 1.8 MG/DL (ref 1.6–2.6)
MCH RBC QN AUTO: 30.2 PG (ref 26–35)
MCHC RBC AUTO-ENTMCNC: 33.3 G/DL (ref 32–34.5)
MCV RBC AUTO: 90.5 FL (ref 80–99.9)
MONOCYTES NFR BLD: 0.43 K/UL (ref 0.1–0.95)
MONOCYTES NFR BLD: 7 % (ref 2–12)
NEUTROPHILS NFR BLD: 62 % (ref 43–80)
NEUTS SEG NFR BLD: 4.12 K/UL (ref 1.8–7.3)
NITRITE UR QL STRIP: NEGATIVE
PH UR STRIP: 5.5 [PH] (ref 5–8)
PLATELET # BLD AUTO: 321 K/UL (ref 130–450)
PMV BLD AUTO: 9.1 FL (ref 7–12)
POTASSIUM SERPL-SCNC: 4.9 MMOL/L (ref 3.5–5)
PROT SERPL-MCNC: 7.9 G/DL (ref 6.4–8.3)
PROT UR STRIP-MCNC: NEGATIVE MG/DL
RBC # BLD AUTO: 4.31 M/UL (ref 3.5–5.5)
RBC #/AREA URNS HPF: ABNORMAL /HPF
SODIUM SERPL-SCNC: 135 MMOL/L (ref 132–146)
SP GR UR STRIP: 1.02 (ref 1–1.03)
TROPONIN I SERPL HS-MCNC: <6 NG/L (ref 0–9)
UROBILINOGEN UR STRIP-ACNC: 1 EU/DL (ref 0–1)
WBC #/AREA URNS HPF: ABNORMAL /HPF
WBC OTHER # BLD: 6.6 K/UL (ref 4.5–11.5)

## 2025-03-20 PROCEDURE — 2580000003 HC RX 258

## 2025-03-20 PROCEDURE — 93010 ELECTROCARDIOGRAM REPORT: CPT | Performed by: INTERNAL MEDICINE

## 2025-03-20 PROCEDURE — 85025 COMPLETE CBC W/AUTO DIFF WBC: CPT

## 2025-03-20 PROCEDURE — 99285 EMERGENCY DEPT VISIT HI MDM: CPT

## 2025-03-20 PROCEDURE — 83735 ASSAY OF MAGNESIUM: CPT

## 2025-03-20 PROCEDURE — 81001 URINALYSIS AUTO W/SCOPE: CPT

## 2025-03-20 PROCEDURE — 72125 CT NECK SPINE W/O DYE: CPT

## 2025-03-20 PROCEDURE — 93005 ELECTROCARDIOGRAM TRACING: CPT

## 2025-03-20 PROCEDURE — 70450 CT HEAD/BRAIN W/O DYE: CPT

## 2025-03-20 PROCEDURE — 84484 ASSAY OF TROPONIN QUANT: CPT

## 2025-03-20 PROCEDURE — 82962 GLUCOSE BLOOD TEST: CPT

## 2025-03-20 PROCEDURE — 71046 X-RAY EXAM CHEST 2 VIEWS: CPT

## 2025-03-20 PROCEDURE — 80053 COMPREHEN METABOLIC PANEL: CPT

## 2025-03-20 RX ORDER — 0.9 % SODIUM CHLORIDE 0.9 %
1000 INTRAVENOUS SOLUTION INTRAVENOUS ONCE
Status: COMPLETED | OUTPATIENT
Start: 2025-03-20 | End: 2025-03-20

## 2025-03-20 RX ADMIN — SODIUM CHLORIDE 1000 ML: 0.9 INJECTION, SOLUTION INTRAVENOUS at 15:40

## 2025-03-20 ASSESSMENT — LIFESTYLE VARIABLES
HOW OFTEN DO YOU HAVE A DRINK CONTAINING ALCOHOL: NEVER
HOW MANY STANDARD DRINKS CONTAINING ALCOHOL DO YOU HAVE ON A TYPICAL DAY: PATIENT DOES NOT DRINK

## 2025-03-20 NOTE — ED PROVIDER NOTES
Department of Emergency Medicine   ED Provider Note  Admit Date/RoomTime: 3/20/2025  1:12 PM  ED Room: 20/20          History of Present Illness:  3/20/25, Time: 1:40 PM EDT      Patient is a 49 y.o. female presents with a chief complaint of syncope and twitching  This has been occurring for today.  Patient states that it gets better with nothing.  Patient states that it gets worse with nothing.  Patient states that it is severe in severity.  Patient states it was acute in onset.  Patient notes she started having some facial twitching about 45 minutes ago and then felt dizzy like the room was spinning and then she had a syncopal episode at work today.  No headache, blurry or double vision, numbness or tingling, weakness, chest pain, shortness of breath, cough or congestion, abdominal pain, nausea or vomiting, leg swelling.    Review of Systems:     Pertinent positives and negatives are stated within HPI.      --------------------------------------------- PAST HISTORY ---------------------------------------------  Past Medical History:  has a past medical history of Arthritis, Asthma, Hypertension, and SAUL (obstructive sleep apnea).    Past Surgical History:  has a past surgical history that includes Tubal ligation; Bunionectomy (Left, 8/9/13); Foot surgery (Right, 1/30/2014); other surgical history (Right, 02/09/2018); Dilation and curettage of uterus (N/A, 2/15/2021); Femoral line (7/10/2023); and IR NONTUNNELED VASCULAR CATHETER > 5 YEARS (7/10/2023).    Social History:  reports that she has been smoking cigarettes. She has a 3.8 pack-year smoking history. She has been exposed to tobacco smoke. She has never used smokeless tobacco. She reports current alcohol use of about 4.0 standard drinks of alcohol per week. She reports current drug use. Drug: Marijuana (Weed).    Family History: family history includes Breast Cancer in her mother; Cancer in her mother; Diabetes in her father; Heart Attack in her mother; High

## 2025-03-20 NOTE — DISCHARGE INSTRUCTIONS
Followed up with Marina Hameed on 3/20/2025 at 6:24 PM. Patient left the ED with a disposition of AMA on . Patient cited personal obligations as reason. Advised patient to follow up with a primary care physician or return to the Emergency Department if symptoms worsen.   Flavia ePrez, DO      Follow-up with PCP.  Please return to ED if symptoms change or worsen.

## 2025-07-02 ENCOUNTER — TELEPHONE (OUTPATIENT)
Dept: CARDIOLOGY CLINIC | Age: 50
End: 2025-07-02

## (undated) DEVICE — CATHETER TRAY, SURESTEP, 16FR, URINE METER W/STATLOCK

## (undated) DEVICE — LENS OLYMPUS 12 DEG 3MM

## (undated) DEVICE — TAPE, SILK, DURAPORE, 3 IN X 10 YD, LF

## (undated) DEVICE — TUBING, SMOKE EVAC, 3/8 X 10 FT

## (undated) DEVICE — SUTURE, NUROLON, 4-0, 18 IN, TF, CONTROL RELEASE, MULTIPACK, BLACK

## (undated) DEVICE — OLYMPUS HYSTEROSCOPE

## (undated) DEVICE — TOWEL,OR,DSP,ST,BLUE,STD,6/PK,12PK/CS: Brand: MEDLINE

## (undated) DEVICE — TRAY PROCED DILATATION CURETTAGE

## (undated) DEVICE — BAG, PLASTIC, 10 X 17 IN

## (undated) DEVICE — BLADE, OPHTHALMIC, MINI, STRAIGHT, DOUBLE BEVEL, SHARP ALL AROUND

## (undated) DEVICE — PACK PROC 3IN1 W/ L12FT DIA0.25IN REINF SUCT TBNG W50XL901IN

## (undated) DEVICE — GAUZE,SPONGE,4"X4",16PLY,XRAY,STRL,LF: Brand: MEDLINE

## (undated) DEVICE — PLEDGET, PTFE, FIRM, 3/8 X 3/16 X 1/6 IN, MULTIPACK

## (undated) DEVICE — SPONGE, HEMOSTATIC, GELATIN, SURGIFOAM, 2 X 6 CM X 7 MM

## (undated) DEVICE — SPONGE, HEMOSTATIC, GELATIN, SURGIFOAM, 8 X 12.5 CM X 10 MM

## (undated) DEVICE — COVER, TABLE, UHC

## (undated) DEVICE — SEALANT, HEMOSTATIC, FLOSEAL, 10 ML

## (undated) DEVICE — MARKER,SKIN,WI/RULER AND LABELS: Brand: MEDLINE

## (undated) DEVICE — BUR, ROUTER BIT, FA2, TAPERED, 2.3MM

## (undated) DEVICE — BANDAGE, GAUZE, CONFORMING, KERLIX, 6 PLY, 4.5 IN X 4.1 YD

## (undated) DEVICE — TRAY,VAG PREP,2PR VNYL GLV,4 C: Brand: MEDLINE INDUSTRIES, INC.

## (undated) DEVICE — CONTAINER, SPECIMEN, 120 ML, STERILE

## (undated) DEVICE — DRESSING, GAUZE, PETROLATUM, PATCH, XEROFORM, 1 X 8 IN, STERILE

## (undated) DEVICE — Device

## (undated) DEVICE — CAMERA STRYKER 1488 HD GEN

## (undated) DEVICE — PAD, GROUNDING, ELECTROSURGICAL, W/9 FT CABLE, POLYHESIVE II, ADULT, LF

## (undated) DEVICE — SPHERE, STEALTHSTATION, 5-PK

## (undated) DEVICE — REST, HEAD, BAGEL, 9 IN

## (undated) DEVICE — GOWN, SURGICAL, SMARTGOWN, XLARGE, STERILE

## (undated) DEVICE — DRAPE, SHEET, UTILITY, NON ABSORBENT, 18 X 26 IN, LF

## (undated) DEVICE — Z DISCONTINUED NO SUB IDED KIT ENDOMET ABLAT IMPED CTRL DEV W/ RF CTRL FTSWCH SUCT LN

## (undated) DEVICE — BUR, STRAIGHT ROUTER

## (undated) DEVICE — DRAPE, MICROSCOPE, FOR ZEISS 65MM, VARI-LENS II, 52 X 150

## (undated) DEVICE — TUBING, SUCTION, 1/4" X 10', STRAIGHT: Brand: MEDLINE

## (undated) DEVICE — SPONGE, HEMOSTATIC, CELLULOSE, SURGICEL, FIBRILLAR, 2 X 4 IN

## (undated) DEVICE — COVER,LIGHT HANDLE,FLX,1/PK: Brand: MEDLINE INDUSTRIES, INC.

## (undated) DEVICE — PITCHER, GRADUATE, 32 OZ (1200CC), STERILE

## (undated) DEVICE — STAY SET, SURGICAL, 12MM BLUNT HOOK, 8/PK

## (undated) DEVICE — PAD MATERNITY CURITY ADH STRIP DISP

## (undated) DEVICE — PIN, SKULL, MAYFIELD, ADULT

## (undated) DEVICE — DRAINAGE, MONITORING SYSTEM, EXTERNAL CSF

## (undated) DEVICE — GOWN,SIRUS,NONRNF,SETINSLV,XL,20/CS: Brand: MEDLINE

## (undated) DEVICE — MANIFOLD, 4 PORT NEPTUNE STANDARD

## (undated) DEVICE — Z INACTIVE USE 2660664 SOLUTION IRRIG 3000ML 0.9% SOD CHL USP UROMATIC PLAS CONT

## (undated) DEVICE — GLOVE ORANGE PI 8   MSG9080

## (undated) DEVICE — SYRINGE, 60 CC, IRRIGATION, BULB, CONTRO-BULB, PAPER POUCH

## (undated) DEVICE — SPONGE, HEMOSTATIC, CELLULOSE, SURGICEL, 2 X 14 IN

## (undated) DEVICE — PROTECTOR, NERVE, ULNAR, PINK

## (undated) DEVICE — RETRACTOR, HOOK, FISH, NEURO

## (undated) DEVICE — APPLICATOR, PREP, CHLORAPREP, W/ORANGE TINT, 10.5ML

## (undated) DEVICE — MARKER, SKIN, RULER AND LABEL PACK, CUSTOM

## (undated) DEVICE — PAD N ADH W3XL4IN POLY COT SFT PERF FLM EASILY CUT ABSRB

## (undated) DEVICE — DEVICE, ADHERUS, AUTOSPRAY, ET DURAL SEALANT

## (undated) DEVICE — SPONGE, HEMOSTATIC, CELLULOSE, SURGICEL, NU-KNIT, 3 X 4 IN

## (undated) DEVICE — BUR, PERFORATOR, CRANIAL, ACRA-CUT 14/11MM, CDM

## (undated) DEVICE — COVER, CART, 45 X 27 X 48 IN, CLEAR

## (undated) DEVICE — DOUBLE BASIN SET: Brand: MEDLINE INDUSTRIES, INC.

## (undated) DEVICE — CYSTO/BLADDER IRRIGATION SET, REGULATING CLAMP

## (undated) DEVICE — SUTURE, MONOCRYL, 4-0, 18 IN, PS2, UNDYED

## (undated) DEVICE — DRAPE PACK, CRANIOTOMY, CUSTOM, UHC